# Patient Record
Sex: FEMALE | Race: WHITE | NOT HISPANIC OR LATINO | Employment: FULL TIME | URBAN - NONMETROPOLITAN AREA
[De-identification: names, ages, dates, MRNs, and addresses within clinical notes are randomized per-mention and may not be internally consistent; named-entity substitution may affect disease eponyms.]

---

## 2017-05-31 ENCOUNTER — OFFICE VISIT (OUTPATIENT)
Dept: FAMILY MEDICINE | Facility: OTHER | Age: 37
End: 2017-05-31
Payer: COMMERCIAL

## 2017-05-31 VITALS
DIASTOLIC BLOOD PRESSURE: 62 MMHG | BODY MASS INDEX: 24.07 KG/M2 | TEMPERATURE: 97.8 F | HEIGHT: 62 IN | SYSTOLIC BLOOD PRESSURE: 100 MMHG | WEIGHT: 130.8 LBS | HEART RATE: 72 BPM | RESPIRATION RATE: 16 BRPM

## 2017-05-31 DIAGNOSIS — L23.7 CONTACT DERMATITIS DUE TO POISON IVY: Primary | ICD-10-CM

## 2017-05-31 PROCEDURE — 99213 OFFICE O/P EST LOW 20 MIN: CPT | Performed by: PHYSICIAN ASSISTANT

## 2017-05-31 RX ORDER — PREDNISONE 20 MG/1
TABLET ORAL
Qty: 20 TABLET | Refills: 0 | Status: SHIPPED | OUTPATIENT
Start: 2017-05-31 | End: 2018-03-08

## 2017-05-31 RX ORDER — CLOBETASOL PROPIONATE 0.05 MG/G
GEL TOPICAL 2 TIMES DAILY
Qty: 45 G | Refills: 0 | Status: SHIPPED | OUTPATIENT
Start: 2017-05-31 | End: 2018-03-08

## 2017-05-31 ASSESSMENT — PAIN SCALES - GENERAL: PAINLEVEL: SEVERE PAIN (6)

## 2017-05-31 NOTE — PROGRESS NOTES
"  SUBJECTIVE:                                                    Nicole Phoenix is a 37 year old female who presents to clinic today for the following health issues:      Rash     Onset: 6 days    Description:   Location: bilateral lower leg, groin area  Character: blotchy, raised, painful, burning, draining, red  Itching (Pruritis): YES    Progression of Symptoms:  worsening    Accompanying Signs & Symptoms:  Fever: no   Body aches or joint pain: YES  Sore throat symptoms: no   Recent cold symptoms: no    History:   Previous similar rash: no     Precipitating factors:   Exposure to similar rash: no   New exposures: None   Recent travel: no     Alleviating factors:  nothing     Therapies Tried and outcome: poison ivy wash, hydrocortisone, poison ivy itch, Apple cider vinegar, baking soda, no relief    OBJECTIVE:  Vitals: Blood pressure 100/62, pulse 72, temperature 97.8  F (36.6  C), temperature source Oral, resp. rate 16, height 5' 2\" (1.575 m), weight 130 lb 12.8 oz (59.3 kg), not currently breastfeeding.  BMI: Body mass index is 23.92 kg/(m^2).  General:  Patient is in no apparent distress, well developed and well nourished with good attention to grooming and pleasant affect.  Cardiac:  Normal RRR.  Negative murmur, S1 and S2 present.  Negative heaves or thrills.  Respiratory:  Chest wall normal to palpation and percussion.  BS CTA T/O.  Negative retractions or accessory muscle use noted.  Pt has a good non-productive cough.  Skin:  Patient has well outlined plaques with mild edema and vesicular lesions that have crusted over as well to the lower legs, buttocks and thighs.  There is evidence of excoriation to the sites as well.    ASSESSMENT and PLAN:  (L23.7) Contact dermatitis due to poison ivy  (primary encounter diagnosis)  Comment: noted  Plan: predniSONE (DELTASONE) 20 MG tablet, clobetasol        (TEMOVATE) 0.05 % GEL topical gel        As directed    Follow up only if unimproved.    Domingo Peterson PA-C    "

## 2017-05-31 NOTE — MR AVS SNAPSHOT
"              After Visit Summary   5/31/2017    Nicole Phoenix    MRN: [id number]           Patient Information     Date Of Birth          1980        Visit Information        Provider Department      5/31/2017 1:00 PM Domingo Archuleta PA-C [facility]        Today's Diagnoses     Contact dermatitis due to poison ivy    -  1       Follow-ups after your visit        Your next 10 appointments already scheduled     May 31, 2017  1:00 PM CDT   SHORT with Domingo Archuleta PA-C   [facility] ([facility])    [address] [facility] [id number] [phone]              Who to contact     If you have questions or need follow up information about today's clinic visit or your schedule please contact [facility] directly at [phone].  Normal or non-critical lab and imaging results will be communicated to you by MyChart, letter or phone within 4 business days after the clinic has received the results. If you do not hear from us within 7 days, please contact the clinic through MyChart or phone. If you have a critical or abnormal lab result, we will notify you by phone as soon as possible.  Submit refill requests through Embly or call your pharmacy and they will forward the refill request to us. Please allow 3 business days for your refill to be completed.          Additional Information About Your Visit        MyChart Information     Embly lets you send messages to your doctor, view your test results, renew your prescriptions, schedule appointments and more. To sign up, go to [email or web address] . Click on \"Log in\" on the left side of the screen, which will take you to the Welcome page. Then click on \"Sign up Now\" on the right side of the page.     You will be asked to enter the access code listed below, as well as some personal information. Please follow the directions to create your username and password.     Your access code is: [email or web address]  Expires: " "2017 12:59 PM     Your access code will  in 90 days. If you need help or a new code, please call your [place] clinic or [phone].        Care EveryWhere ID     This is your Care EveryWhere ID. This could be used by other organizations to access your [place] medical records  [id number]        Your Vitals Were     Pulse Temperature Respirations Height BMI (Body Mass Index)       72 97.8  F (36.6  C) (Oral) 16 5' 2\" (1.575 m) 23.92 kg/m2        Blood Pressure from Last 3 Encounters:   17 100/62   16 92/52   10/18/16 122/78    Weight from Last 3 Encounters:   17 130 lb 12.8 oz (59.3 kg)   16 132 lb (59.9 kg)   10/18/16 129 lb 11.2 oz (58.8 kg)              Today, you had the following     No orders found for display         Today's Medication Changes          These changes are accurate as of: 17 12:59 PM.  If you have any questions, ask your nurse or doctor.               Start taking these medicines.        Dose/Directions    clobetasol 0.05 % Gel topical gel   Commonly known as:  TEMOVATE   Used for:  Contact dermatitis due to poison ivy   Started by:  Domingo Archuleta PA-C        Apply topically 2 times daily   Quantity:  45 g   Refills:  0       predniSONE 20 MG tablet   Commonly known as:  DELTASONE   Used for:  Contact dermatitis due to poison ivy   Started by:  Domingo Archuleta PA-C        Take 3 tabs (60 mg) by mouth daily x 3 days, 2 tabs (40 mg) daily x 3 days, 1 tab (20 mg) daily x 3 days, then 1/2 tab (10 mg) x 3 days.   Quantity:  20 tablet   Refills:  0            Where to get your medicines      These medications were sent to [facility] [place], [place] - [address]   [address] [facility] [address]     Phone:  [phone]     clobetasol 0.05 % Gel topical gel    predniSONE 20 MG tablet                Primary Care Provider Office Phone # Fax #    GINA Diehl [phone]       [place] [address] [address]  " [facility] [address]        Thank you!     Thank you for choosing [facility]  for your care. Our goal is always to provide you with excellent care. Hearing back from our patients is one way we can continue to improve our services. Please take a few minutes to complete the written survey that you may receive in the mail after your visit with us. Thank you!             Your Updated Medication List - Protect others around you: Learn how to safely use, store and throw away your medicines at www.disposemymeds.org.          This list is accurate as of: 5/31/17 12:59 PM.  Always use your most recent med list.                   Brand Name Dispense Instructions for use    clobetasol 0.05 % Gel topical gel    TEMOVATE    45 g    Apply topically 2 times daily       predniSONE 20 MG tablet    DELTASONE    20 tablet    Take 3 tabs (60 mg) by mouth daily x 3 days, 2 tabs (40 mg) daily x 3 days, 1 tab (20 mg) daily x 3 days, then 1/2 tab (10 mg) x 3 days.

## 2017-05-31 NOTE — NURSING NOTE
"Chief Complaint   Patient presents with     Derm Problem     lower legs, groin,        Initial /62 (BP Location: Right arm, Patient Position: Chair, Cuff Size: Adult Regular)  Pulse 72  Temp 97.8  F (36.6  C) (Oral)  Resp 16  Ht 5' 2\" (1.575 m)  Wt 130 lb 12.8 oz (59.3 kg)  BMI 23.92 kg/m2 Estimated body mass index is 23.92 kg/(m^2) as calculated from the following:    Height as of this encounter: 5' 2\" (1.575 m).    Weight as of this encounter: 130 lb 12.8 oz (59.3 kg).  Medication Reconciliation: complete       Abeba YA LPN      "

## 2017-06-09 ENCOUNTER — TELEPHONE (OUTPATIENT)
Dept: FAMILY MEDICINE | Facility: OTHER | Age: 37
End: 2017-06-09

## 2017-06-09 NOTE — TELEPHONE ENCOUNTER
Called pt, advised to be seen next week per Cynthia Mukherjee, GINA CNP. Patient voiced understanding..  ................Bora Culp LPN,   June 9, 2017,      4:56 PM,   [facility]

## 2017-06-09 NOTE — TELEPHONE ENCOUNTER
Called and spoke to the patient. She said last Wednesday she saw Domingo for poison rhea on her legs. Patient was put on Prednisone which she finished today. Patient said the poison ivy areas seemed to clear up but now she has developed another rash. Patient said she has had this rash for 2 days. She feels today it has gotten worse. Patient said the rash almost seems like it surrounds where the poison ivy areas were. Patient said the rash is all over her stomach, both arms, and legs. She said the rash is raised, itchy, and has a burning sensation. No blisters. Patient denies any trouble breathing. She said her daughter was looking at the back of her legs and said there looks like there are some purple blotches to the back of her legs.     Patient denies: trouble breathing, swelling in the mouth/throat/tongue, fever, headache, stiff neck, vomiting, confusion, facial/eye swelling, blistering rash, or open sores.     Patient is wondering what Cynthia would recommend. She said she finished her Prednisone today and feels the rash has gotten a lot worse. She is also concerned about the purple blotches on the back of her legs.     Will route to provider for further recommendations.     Talia Haro RN  [facility]

## 2017-06-09 NOTE — TELEPHONE ENCOUNTER
Reason for call:  Patient reporting a symptom    Symptom or request: rash. Pt had poison ivy. That seems to be better. Pt developed another rash.    Duration (how long have symptoms been present): 2 days    Have you been treated for this before? Yes    Additional comments:     Phone Number patient can be reached at:      Best Time:  anytime    Can we leave a detailed message on this number:  YES    Call taken on 6/9/2017 at 2:24 PM by Akanksha Yepez

## 2017-12-02 ENCOUNTER — HEALTH MAINTENANCE LETTER (OUTPATIENT)
Age: 37
End: 2017-12-02

## 2018-01-06 ENCOUNTER — HEALTH MAINTENANCE LETTER (OUTPATIENT)
Age: 38
End: 2018-01-06

## 2018-03-08 ENCOUNTER — OFFICE VISIT (OUTPATIENT)
Dept: FAMILY MEDICINE | Facility: OTHER | Age: 38
End: 2018-03-08
Payer: COMMERCIAL

## 2018-03-08 VITALS
SYSTOLIC BLOOD PRESSURE: 122 MMHG | TEMPERATURE: 97.6 F | HEIGHT: 62 IN | BODY MASS INDEX: 24.16 KG/M2 | WEIGHT: 131.3 LBS | DIASTOLIC BLOOD PRESSURE: 80 MMHG | RESPIRATION RATE: 16 BRPM | HEART RATE: 76 BPM

## 2018-03-08 DIAGNOSIS — R53.83 FATIGUE, UNSPECIFIED TYPE: ICD-10-CM

## 2018-03-08 DIAGNOSIS — N92.6 IRREGULAR PERIODS: ICD-10-CM

## 2018-03-08 DIAGNOSIS — F33.2 SEVERE EPISODE OF RECURRENT MAJOR DEPRESSIVE DISORDER, WITHOUT PSYCHOTIC FEATURES (H): Primary | ICD-10-CM

## 2018-03-08 LAB
ERYTHROCYTE [DISTWIDTH] IN BLOOD BY AUTOMATED COUNT: 12 % (ref 10–15)
HCT VFR BLD AUTO: 43.1 % (ref 35–47)
HGB BLD-MCNC: 14.6 G/DL (ref 11.7–15.7)
MCH RBC QN AUTO: 31.9 PG (ref 26.5–33)
MCHC RBC AUTO-ENTMCNC: 33.9 G/DL (ref 31.5–36.5)
MCV RBC AUTO: 94 FL (ref 78–100)
PLATELET # BLD AUTO: 282 10E9/L (ref 150–450)
RBC # BLD AUTO: 4.57 10E12/L (ref 3.8–5.2)
TSH SERPL DL<=0.005 MIU/L-ACNC: 0.81 MU/L (ref 0.4–4)
WBC # BLD AUTO: 18.4 10E9/L (ref 4–11)

## 2018-03-08 PROCEDURE — 85027 COMPLETE CBC AUTOMATED: CPT | Performed by: PHYSICIAN ASSISTANT

## 2018-03-08 PROCEDURE — 84443 ASSAY THYROID STIM HORMONE: CPT | Performed by: PHYSICIAN ASSISTANT

## 2018-03-08 PROCEDURE — 36415 COLL VENOUS BLD VENIPUNCTURE: CPT | Performed by: PHYSICIAN ASSISTANT

## 2018-03-08 PROCEDURE — 99214 OFFICE O/P EST MOD 30 MIN: CPT | Performed by: PHYSICIAN ASSISTANT

## 2018-03-08 RX ORDER — MIRTAZAPINE 15 MG/1
15 TABLET, FILM COATED ORAL AT BEDTIME
Qty: 30 TABLET | Refills: 1 | Status: SHIPPED | OUTPATIENT
Start: 2018-03-08 | End: 2018-04-05

## 2018-03-08 RX ORDER — VENLAFAXINE HYDROCHLORIDE 37.5 MG/1
37.5 CAPSULE, EXTENDED RELEASE ORAL DAILY
Qty: 30 CAPSULE | Refills: 1 | Status: SHIPPED | OUTPATIENT
Start: 2018-03-08 | End: 2018-04-05

## 2018-03-08 ASSESSMENT — ANXIETY QUESTIONNAIRES
3. WORRYING TOO MUCH ABOUT DIFFERENT THINGS: NEARLY EVERY DAY
7. FEELING AFRAID AS IF SOMETHING AWFUL MIGHT HAPPEN: MORE THAN HALF THE DAYS
1. FEELING NERVOUS, ANXIOUS, OR ON EDGE: SEVERAL DAYS
GAD7 TOTAL SCORE: 9
6. BECOMING EASILY ANNOYED OR IRRITABLE: MORE THAN HALF THE DAYS
IF YOU CHECKED OFF ANY PROBLEMS ON THIS QUESTIONNAIRE, HOW DIFFICULT HAVE THESE PROBLEMS MADE IT FOR YOU TO DO YOUR WORK, TAKE CARE OF THINGS AT HOME, OR GET ALONG WITH OTHER PEOPLE: EXTREMELY DIFFICULT
5. BEING SO RESTLESS THAT IT IS HARD TO SIT STILL: NOT AT ALL
2. NOT BEING ABLE TO STOP OR CONTROL WORRYING: NOT AT ALL

## 2018-03-08 ASSESSMENT — PAIN SCALES - GENERAL: PAINLEVEL: EXTREME PAIN (8)

## 2018-03-08 ASSESSMENT — PATIENT HEALTH QUESTIONNAIRE - PHQ9: 5. POOR APPETITE OR OVEREATING: SEVERAL DAYS

## 2018-03-08 NOTE — MR AVS SNAPSHOT
After Visit Summary   3/8/2018    Nicole Phoenix    MRN: [id number]           Patient Information     Date Of Birth          1980        Visit Information        Provider Department      3/8/2018 10:40 AM Ludy Cruz PA-C [facility]        Today's Diagnoses     Severe episode of recurrent major depressive disorder, without psychotic features (H)    -  1    Irregular periods        Fatigue, unspecified type          Care Instructions    I will get some lab work today and will have you start some medications to help with sleep and depression symptoms. I will refer you to counseling, they will call you to schedule. I would encourage you to keep a schedule to your day and avoid sleeping for more than 9 hours in a 24 hour period. Follow up if worsening symptoms or not tolerating medications and follow up with your primary provider in 4-5 weeks for a recheck and refill of medications.           Follow-ups after your visit        Additional Services     MENTAL HEALTH REFERRAL  - Adult; Outpatient Treatment; Individual/Couples/Family/Group Therapy/Health Psychology; [facility] [phone]; We will contact you to schedule the appointment or please call with any questions       All scheduling is subject to the client's specific insurance plan & benefits, provider/location availability, and provider clinical specialities.  Please arrive 15 minutes early for your first appointment and bring your completed paperwork.    Please be aware that coverage of these services is subject to the terms and limitations of your health insurance plan.  Call member services at your health plan with any benefit or coverage questions.                            Follow-up notes from your care team     Return if symptoms worsen or fail to improve.      Who to contact     If you have questions or need follow up information about today's clinic visit or your schedule please contact  "[facility] directly at [phone].  Normal or non-critical lab and imaging results will be communicated to you by MyChart, letter or phone within 4 business days after the clinic has received the results. If you do not hear from us within 7 days, please contact the clinic through [employer] or phone. If you have a critical or abnormal lab result, we will notify you by phone as soon as possible.  Submit refill requests through MD-IT or call your pharmacy and they will forward the refill request to us. Please allow 3 business days for your refill to be completed.          Additional Information About Your Visit        [employer] Information     MD-IT lets you send messages to your doctor, view your test results, renew your prescriptions, schedule appointments and more. To sign up, go to [email or web address] . Click on \"Log in\" on the left side of the screen, which will take you to the Welcome page. Then click on \"Sign up Now\" on the right side of the page.     You will be asked to enter the access code listed below, as well as some personal information. Please follow the directions to create your username and password.     Your access code is: [email or web address]  Expires: 2018 11:15 AM     Your access code will  in 90 days. If you need help or a new code, please call your [place] clinic or [phone].        Care EveryWhere ID     This is your Care EveryWhere ID. This could be used by other organizations to access your [place] medical records  [id number]        Your Vitals Were     Pulse Temperature Respirations Height BMI (Body Mass Index)       76 97.6  F (36.4  C) (Oral) 16 5' 2\" (1.575 m) 24.02 kg/m2        Blood Pressure from Last 3 Encounters:   18 122/80   17 100/62   16 92/52    Weight from Last 3 Encounters:   18 131 lb 4.8 oz (59.6 kg)   17 130 lb 12.8 oz (59.3 kg)   16 132 lb (59.9 kg)              We Performed the Following     CBC with " platelets     MENTAL HEALTH REFERRAL  - Adult; Outpatient Treatment; Individual/Couples/Family/Group Therapy/Health Psychology; G: [facility] [phone]; We will contact you to schedule the appointment or please call with any questions     TSH with free T4 reflex          Today's Medication Changes          These changes are accurate as of 3/8/18 11:15 AM.  If you have any questions, ask your nurse or doctor.               Start taking these medicines.        Dose/Directions    mirtazapine 15 MG tablet   Commonly known as:  REMERON   Used for:  Severe episode of recurrent major depressive disorder, without psychotic features (H)   Started by:  Ludy Cruz PA-C        Dose:  15 mg   Take 1 tablet (15 mg) by mouth At Bedtime   Quantity:  30 tablet   Refills:  1       venlafaxine 37.5 MG 24 hr capsule   Commonly known as:  EFFEXOR-XR   Used for:  Severe episode of recurrent major depressive disorder, without psychotic features (H)   Started by:  Ludy Cruz PA-C        Dose:  37.5 mg   Take 1 capsule (37.5 mg) by mouth daily   Quantity:  30 capsule   Refills:  1            Where to get your medicines      These medications were sent to [facility] [address]   [address] [facility] [address]     Phone:  [phone]     mirtazapine 15 MG tablet    venlafaxine 37.5 MG 24 hr capsule                Primary Care Provider Office Phone # Fax #    Cynthia Lizzie Mukherjee, APRN [phone]       [address] [facility] [address]        Equal Access to Services     [facility] AH: Hadii kassie sethi hadasho Soomaali, waaxda luqadaha, qaybta kaalmada adeegyada, kelley jiang. So [facility] [phone].    ATENCIÓN: Si habla español, tiene a bai disposición servicios gratuitos de asistencia lingüística. Llame al [phone].    We comply with applicable federal civil rights laws and [place] laws. We do not discriminate on the basis  of race, color, national origin, age, disability, sex, sexual orientation, or gender identity.            Thank you!     Thank you for choosing [facility]  for your care. Our goal is always to provide you with excellent care. Hearing back from our patients is one way we can continue to improve our services. Please take a few minutes to complete the written survey that you may receive in the mail after your visit with us. Thank you!             Your Updated Medication List - Protect others around you: Learn how to safely use, store and throw away your medicines at www.disposemymeds.org.          This list is accurate as of 3/8/18 11:15 AM.  Always use your most recent med list.                   Brand Name Dispense Instructions for use Diagnosis    mirtazapine 15 MG tablet    REMERON    30 tablet    Take 1 tablet (15 mg) by mouth At Bedtime    Severe episode of recurrent major depressive disorder, without psychotic features (H)       venlafaxine 37.5 MG 24 hr capsule    EFFEXOR-XR    30 capsule    Take 1 capsule (37.5 mg) by mouth daily    Severe episode of recurrent major depressive disorder, without psychotic features (H)

## 2018-03-08 NOTE — NURSING NOTE
"Chief Complaint   Patient presents with     Depression     Anxiety       Initial /80 (BP Location: Right arm, Patient Position: Chair, Cuff Size: Adult Regular)  Pulse 76  Temp 97.6  F (36.4  C) (Oral)  Resp 16  Ht 5' 2\" (1.575 m)  Wt 131 lb 4.8 oz (59.6 kg)  BMI 24.02 kg/m2 Estimated body mass index is 24.02 kg/(m^2) as calculated from the following:    Height as of this encounter: 5' 2\" (1.575 m).    Weight as of this encounter: 131 lb 4.8 oz (59.6 kg).  Medication Reconciliation: complete     Abeba YA LPN      "

## 2018-03-08 NOTE — PROGRESS NOTES
SUBJECTIVE:   Nicole Phoenix is a 38 year old female who presents to clinic today for the following health issues:      Abnormal Mood Symptoms  Onset: 6 months    Description:   Depression: YES  Anxiety: YES    Accompanying Signs & Symptoms:  Still participating in activities that you used to enjoy: no  Fatigue: YES  Irritability: YES  Difficulty concentrating: YES  Changes in appetite: YES  Problems with sleep: YES  Heart racing/beating fast : YES  Thoughts of hurting yourself or others: yes, thought of hurting herself    History:   Recent stress: YES  Prior depression hospitalization: None  Family history of depression: YES  Family history of anxiety: YES    Precipitating factors:   Alcohol/drug use: YES- alcohol     Alleviating factors:  nothing    Therapies Tried and outcome: Lexapro    Patient reports that she has had issues with depression off and on for most of her life but notes that in the last 6 months her symptoms have been getting progressively worse. She reports that yesterday her thoughts got bad enough that she went and saw a psychologist in [place]. She has thoughts of how easy it would be to kill herself but denies any plan and is not concerned that she would actually do anything to harm herself or others She has good support through friends and family and reports that she does better on days when she works because she stays busy. On Her off stint she spends a lot of time in bed and has been drinking to help numb her feelings. She does not drink at all on days that she works so knows she needs to stop drinking but is not concerned that she cannot do this without detox as she is not drinking on a daily basis. She has been on Lexapro in the past but did not feel like that was helpful. She has had issues sleeping typically issues with falling asleep.     -------------------------------------    Problem list and histories reviewed & adjusted, as indicated.  Additional history: as documented    BP  "Readings from Last 3 Encounters:   03/08/18 122/80   05/31/17 100/62   11/02/16 92/52    Wt Readings from Last 3 Encounters:   03/08/18 131 lb 4.8 oz (59.6 kg)   05/31/17 130 lb 12.8 oz (59.3 kg)   11/02/16 132 lb (59.9 kg)           Reviewed and updated as needed this visit by clinical staff  Tobacco  Allergies  Med Hx  Surg Hx  Fam Hx  Soc Hx      Reviewed and updated as needed this visit by Provider         ROS:  Constitutional, HEENT, cardiovascular, pulmonary, gi and gu systems are negative, except as otherwise noted.    OBJECTIVE:     /80 (BP Location: Right arm, Patient Position: Chair, Cuff Size: Adult Regular)  Pulse 76  Temp 97.6  F (36.4  C) (Oral)  Resp 16  Ht 5' 2\" (1.575 m)  Wt 131 lb 4.8 oz (59.6 kg)  BMI 24.02 kg/m2  Body mass index is 24.02 kg/(m^2).  GENERAL: healthy, alert and no distress  MS: no gross musculoskeletal defects noted, no edema  SKIN: no suspicious lesions or rashes  PSYCH: affect flat, tearful, fatigued, judgement and insight intact and appearance well groomed    Diagnostic Test Results:  Labs pending    ASSESSMENT/PLAN:       ICD-10-CM    1. Severe episode of recurrent major depressive disorder, without psychotic features (H) F33.2 venlafaxine (EFFEXOR-XR) 37.5 MG 24 hr capsule     mirtazapine (REMERON) 15 MG tablet     MENTAL HEALTH REFERRAL  - Adult; Outpatient Treatment; Individual/Couples/Family/Group Therapy/Health Psychology; [facility] [place] [phone]; We will contact you to schedule the appointment or please call with any questions   2. Irregular periods N92.6    3. Fatigue, unspecified type R53.83 TSH with free T4 reflex     CBC with platelets       I will start patient on Effexor and Remeron to help with depression and sleep symptoms. I did review that it would take about 4 weeks for her to notice the effects of her Effexor. I will refer her to therapy and get some lab work and would have her call if any new or worsening symptoms or " if not tolerating her medications. She verbally contracts against self harm but was encouraged to go to the ER if acute worsening of symptoms. Follow up with PCP for recheck and refills in 1 month, sooner as needed.   See Patient Instructions    Ludy Cruz PA-C  [facility]

## 2018-03-09 ASSESSMENT — PATIENT HEALTH QUESTIONNAIRE - PHQ9: SUM OF ALL RESPONSES TO PHQ QUESTIONS 1-9: 18

## 2018-03-09 ASSESSMENT — ANXIETY QUESTIONNAIRES: GAD7 TOTAL SCORE: 9

## 2018-03-12 ENCOUNTER — TELEPHONE (OUTPATIENT)
Dept: FAMILY MEDICINE | Facility: OTHER | Age: 38
End: 2018-03-12

## 2018-03-12 NOTE — TELEPHONE ENCOUNTER
I called this patient with the following lab results per Ludy Cruz PA-C:    Thyroid function is normal and blood cell counts show no evidence of anemia. There is an elevation in white blood cell count and I would have you follow up to recheck this in 1-2 weeks.

## 2018-03-12 NOTE — TELEPHONE ENCOUNTER
----- Message from Ludy Cruz PA-C sent at 3/12/2018  7:57 AM CDT -----  Thyroid function is normal and blood cell counts show no evidence of anemia. There is an elevation in white blood cell count and I would have you follow up to recheck this in 1-2 weeks.

## 2018-03-28 ENCOUNTER — TELEPHONE (OUTPATIENT)
Dept: FAMILY MEDICINE | Facility: OTHER | Age: 38
End: 2018-03-28

## 2018-03-29 NOTE — TELEPHONE ENCOUNTER
CHANCE Carrasco  Left message for patient to return call to clinic  She is overdue for pap due to hx of HPV positive in 2016. She has visit on 4/6/18 for depression follow up. Note added to visit regarding pap. Unsure if you would be able to do pap at same visit.    Thank you,  Lucretia Magana, SHAQUILLEN, RN, Pap Tracking Nurse

## 2018-03-29 NOTE — TELEPHONE ENCOUNTER
Yes, we can do both together, but please change the visit to a physical.     Electronically signed by Cynthia Mukherjee CNP.

## 2018-03-29 NOTE — TELEPHONE ENCOUNTER
11/2/16: NIL Pap, + HR HPV (Neg 16/18). Plan cotest in 1 year.   10/19/17 Cotest reminder letter sent (rlm)  4/6/18 Provider visit - depression follow up

## 2018-04-05 ENCOUNTER — TELEPHONE (OUTPATIENT)
Dept: FAMILY MEDICINE | Facility: OTHER | Age: 38
End: 2018-04-05

## 2018-04-05 ENCOUNTER — OFFICE VISIT (OUTPATIENT)
Dept: FAMILY MEDICINE | Facility: OTHER | Age: 38
End: 2018-04-05
Payer: COMMERCIAL

## 2018-04-05 VITALS
RESPIRATION RATE: 16 BRPM | TEMPERATURE: 98.5 F | BODY MASS INDEX: 25.58 KG/M2 | HEIGHT: 62 IN | DIASTOLIC BLOOD PRESSURE: 60 MMHG | HEART RATE: 112 BPM | SYSTOLIC BLOOD PRESSURE: 104 MMHG | WEIGHT: 139 LBS | OXYGEN SATURATION: 99 %

## 2018-04-05 DIAGNOSIS — Z00.00 ROUTINE GENERAL MEDICAL EXAMINATION AT A HEALTH CARE FACILITY: Primary | ICD-10-CM

## 2018-04-05 DIAGNOSIS — D72.829 LEUKOCYTOSIS, UNSPECIFIED TYPE: ICD-10-CM

## 2018-04-05 DIAGNOSIS — F33.2 SEVERE EPISODE OF RECURRENT MAJOR DEPRESSIVE DISORDER, WITHOUT PSYCHOTIC FEATURES (H): ICD-10-CM

## 2018-04-05 DIAGNOSIS — Z12.4 SCREENING FOR CERVICAL CANCER: ICD-10-CM

## 2018-04-05 LAB
ERYTHROCYTE [DISTWIDTH] IN BLOOD BY AUTOMATED COUNT: 12.1 % (ref 10–15)
HCT VFR BLD AUTO: 40.2 % (ref 35–47)
HGB BLD-MCNC: 13.6 G/DL (ref 11.7–15.7)
MCH RBC QN AUTO: 31.8 PG (ref 26.5–33)
MCHC RBC AUTO-ENTMCNC: 33.8 G/DL (ref 31.5–36.5)
MCV RBC AUTO: 94 FL (ref 78–100)
PLATELET # BLD AUTO: 271 10E9/L (ref 150–450)
RBC # BLD AUTO: 4.28 10E12/L (ref 3.8–5.2)
WBC # BLD AUTO: 12.6 10E9/L (ref 4–11)

## 2018-04-05 PROCEDURE — G0145 SCR C/V CYTO,THINLAYER,RESCR: HCPCS | Performed by: NURSE PRACTITIONER

## 2018-04-05 PROCEDURE — 87624 HPV HI-RISK TYP POOLED RSLT: CPT | Performed by: NURSE PRACTITIONER

## 2018-04-05 PROCEDURE — 85027 COMPLETE CBC AUTOMATED: CPT | Performed by: NURSE PRACTITIONER

## 2018-04-05 PROCEDURE — 36415 COLL VENOUS BLD VENIPUNCTURE: CPT | Performed by: NURSE PRACTITIONER

## 2018-04-05 PROCEDURE — 99395 PREV VISIT EST AGE 18-39: CPT | Performed by: NURSE PRACTITIONER

## 2018-04-05 PROCEDURE — 99213 OFFICE O/P EST LOW 20 MIN: CPT | Mod: 25 | Performed by: NURSE PRACTITIONER

## 2018-04-05 RX ORDER — MIRTAZAPINE 15 MG/1
15 TABLET, FILM COATED ORAL AT BEDTIME
Qty: 30 TABLET | Refills: 1 | Status: SHIPPED | OUTPATIENT
Start: 2018-04-05 | End: 2018-05-31

## 2018-04-05 RX ORDER — VENLAFAXINE HYDROCHLORIDE 75 MG/1
75 CAPSULE, EXTENDED RELEASE ORAL DAILY
Qty: 30 CAPSULE | Refills: 1 | Status: SHIPPED | OUTPATIENT
Start: 2018-04-05 | End: 2018-05-31

## 2018-04-05 ASSESSMENT — PATIENT HEALTH QUESTIONNAIRE - PHQ9
SUM OF ALL RESPONSES TO PHQ QUESTIONS 1-9: 10
10. IF YOU CHECKED OFF ANY PROBLEMS, HOW DIFFICULT HAVE THESE PROBLEMS MADE IT FOR YOU TO DO YOUR WORK, TAKE CARE OF THINGS AT HOME, OR GET ALONG WITH OTHER PEOPLE: VERY DIFFICULT
SUM OF ALL RESPONSES TO PHQ QUESTIONS 1-9: 10

## 2018-04-05 NOTE — LETTER
April 9, 2018      Nicole Phoenix  [address]  [place] [address]        Dear ,    We are writing to inform you of your test results.    White blood cell count is improved now.  Just slightly elevated, likely due to stress.  You should not need any further follow up on this unless you develop other symptoms.     Resulted Orders   CBC with platelets   Result Value Ref Range    WBC 12.6 (H) 4.0 - 11.0 10e9/L    RBC Count 4.28 3.8 - 5.2 10e12/L    Hemoglobin 13.6 11.7 - 15.7 g/dL    Hematocrit 40.2 35.0 - 47.0 %    MCV 94 78 - 100 fl    MCH 31.8 26.5 - 33.0 pg    MCHC 33.8 31.5 - 36.5 g/dL    RDW 12.1 10.0 - 15.0 %    Platelet Count 271 150 - 450 10e9/L     If you have any questions or concerns, please call the clinic at the number listed above.       Sincerely,        GINA Diehl CNP

## 2018-04-05 NOTE — NURSING NOTE
"Chief Complaint   Patient presents with     Physical     RECHECK     depression - slightly worsened     Health Maintenance     pap/hpv, dap       Initial /60  Pulse 112  Temp 98.5  F (36.9  C) (Tympanic)  Resp 16  Ht 5' 2\" (1.575 m)  Wt 139 lb (63 kg)  LMP  (LMP Unknown)  SpO2 99%  Breastfeeding? No  BMI 25.42 kg/m2 Estimated body mass index is 25.42 kg/(m^2) as calculated from the following:    Height as of this encounter: 5' 2\" (1.575 m).    Weight as of this encounter: 139 lb (63 kg).  Medication Reconciliation: complete   ................Bora Culp LPN,   April 5, 2018,      2:37 PM,   [facility]     "

## 2018-04-05 NOTE — LETTER
April 12, 2018    Nicole Phoenix  [address] [facility] [address]    Dear Nicole,  We are happy to inform you that your PAP smear result from 4/5/18 is normal.  We are now able to do a follow up test on PAP smears. The DNA test is for HPV (Human Papilloma Virus). Cervical cancer is closely linked with certain types of HPV. Your results showed no evidence of high risk HPV.  Therefore we recommend you return in 3 years for your next pap smear and HPV test.  You will still need to return to the clinic every year for an annual exam and other preventive tests.  Please contact the clinic at [phone] with any questions.  Sincerely,    GINA Diehl CNP/rlm

## 2018-04-05 NOTE — TELEPHONE ENCOUNTER
----- Message from GINA Diehl CNP sent at 4/5/2018  3:34 PM CDT -----  Please notify patient, call or letter    White blood cell count is improved now.  Just slightly elevated, likely due to stress.  She should not need any further follow up on this unless she develops other symptoms.     Electronically signed by Cynthia Mukherjee CNP.

## 2018-04-05 NOTE — LETTER
My Depression Action Plan  Name: Nicole Phoenix   Date of Birth 1980  Date: 4/5/2018    My doctor: Cynthia Mukherjee   My clinic: [place] [address] [facility] [address] [phone]          GREEN    ZONE   Good Control    What it looks like:     Things are going generally well. You have normal up s and down s. You may even feel depressed from time to time, but bad moods usually last less than a day.   What you need to do:  1. Continue to care for yourself (see self care plan)  2. Check your depression survival kit and update it as needed  3. Follow your physician s recommendations including any medication.  4. Do not stop taking medication unless you consult with your physician first.           YELLOW         ZONE Getting Worse    What it looks like:     Depression is starting to interfere with your life.     It may be hard to get out of bed; you may be starting to isolate yourself from others.    Symptoms of depression are starting to last most all day and this has happened for several days.     You may have suicidal thoughts but they are not constant.   What you need to do:     1. Call your care team, your response to treatment will improve if you keep your care team informed of your progress. Yellow periods are signs an adjustment may need to be made.     2. Continue your self-care, even if you have to fake it!    3. Talk to someone in your support network    4. Open up your depression survival kit           RED    ZONE Medical Alert - Get Help    What it looks like:     Depression is seriously interfering with your life.     You may experience these or other symptoms: You can t get out of bed most days, can t work or engage in other necessary activities, you have trouble taking care of basic hygiene, or basic responsibilities, thoughts of suicide or death that will not go away, self-injurious behavior.     What you need to do:  1. Call your care team and request a  same-day appointment. If they are not available (weekends or after hours) call your local crisis line, emergency room or 911.            Depression Self Care Plan / Survival Kit    Self-Care for Depression  Here s the deal. Your body and mind are really not as separate as most people think.  What you do and think affects how you feel and how you feel influences what you do and think. This means if you do things that people who feel good do, it will help you feel better.  Sometimes this is all it takes.  There is also a place for medication and therapy depending on how severe your depression is, so be sure to consult with your medical provider and/ or Behavioral Health Consultant if your symptoms are worsening or not improving.     In order to better manage my stress, I will:    Exercise  Get some form of exercise, every day. This will help reduce pain and release endorphins, the  feel good  chemicals in your brain. This is almost as good as taking antidepressants!  This is not the same as joining a gym and then never going! (they count on that by the way ) It can be as simple as just going for a walk or doing some gardening, anything that will get you moving.      Hygiene   Maintain good hygiene (Get out of bed in the morning, Make your bed, Brush your teeth, Take a shower, and Get dressed like you were going to work, even if you are unemployed).  If your clothes don't fit try to get ones that do.    Diet  I will strive to eat foods that are good for me, drink plenty of water, and avoid excessive sugar, caffeine, alcohol, and other mood-altering substances.  Some foods that are helpful in depression are: complex carbohydrates, B vitamins, flaxseed, fish or fish oil, fresh fruits and vegetables.    Psychotherapy  I agree to participate in Individual Therapy (if recommended).    Medication  If prescribed medications, I agree to take them.  Missing doses can result in serious side effects.  I understand that drinking  alcohol, or other illicit drug use, may cause potential side effects.  I will not stop my medication abruptly without first discussing it with my provider.    Staying Connected With Others  I will stay in touch with my friends, family members, and my primary care provider/team.    Use your imagination  Be creative.  We all have a creative side; it doesn t matter if it s oil painting, sand castles, or mud pies! This will also kick up the endorphins.    Witness Beauty  (AKA stop and smell the roses) Take a look outside, even in mid-winter. Notice colors, textures. Watch the squirrels and birds.     Service to others  Be of service to others.  There is always someone else in need.  By helping others we can  get out of ourselves  and remember the really important things.  This also provides opportunities for practicing all the other parts of the program.    Humor  Laugh and be silly!  Adjust your TV habits for less news and crime-drama and more comedy.    Control your stress  Try breathing deep, massage therapy, biofeedback, and meditation. Find time to relax each day.     My support system    Clinic Contact:  Phone number:    Contact 1:  Phone number:    Contact 2:  Phone number:    [employer]/:  Phone number:    Therapist:  Phone number:    Local crisis center:    Phone number:    Other community support:  Phone number:

## 2018-04-05 NOTE — PROGRESS NOTES
Called pt, left msg informing to call clinic for results.  ................Bora Culp LPN,   April 5, 2018,      4:38 PM,   [facility]

## 2018-04-05 NOTE — PATIENT INSTRUCTIONS
Increase your Effexor to 75 mg daily     Keep the Remeron dose the same.     Labs will be done today.   For normal results, you will receive a letter with the results in about 2 weeks.  If anything is abnormal or unexpected, someone from the clinic will call you.      Follow up in 1-2 months for recheck.     Preventive Health Recommendations  Female Ages 26 - 39  Yearly exam:   See your health care provider every year in order to    Review health changes.     Discuss preventive care.      Review your medicines if you your doctor has prescribed any.    Until age 30: Get a Pap test every three years (more often if you have had an abnormal result).    After age 30: Talk to your doctor about whether you should have a Pap test every 3 years or have a Pap test with HPV screening every 5 years.   You do not need a Pap test if your uterus was removed (hysterectomy) and you have not had cancer.  You should be tested each year for STDs (sexually transmitted diseases), if you're at risk.   Talk to your provider about how often to have your cholesterol checked.  If you are at risk for diabetes, you should have a diabetes test (fasting glucose).  Shots: Get a flu shot each year. Get a tetanus shot every 10 years.   Nutrition:     Eat at least 5 servings of fruits and vegetables each day.    Eat whole-grain bread, whole-wheat pasta and brown rice instead of white grains and rice.    Talk to your provider about Calcium and Vitamin D.     Lifestyle    Exercise at least 150 minutes a week (30 minutes a day, 5 days of the week). This will help you control your weight and prevent disease.    Limit alcohol to one drink per day.    No smoking.     Wear sunscreen to prevent skin cancer.    See your dentist every six months for an exam and cleaning.

## 2018-04-05 NOTE — PROGRESS NOTES
SUBJECTIVE:   CC: Nicole Phoenix is an 38 year old woman who presents for preventive health visit.     Physical   Annual:     Getting at least 3 servings of Calcium per day::  Yes    Bi-annual eye exam::  Yes    Dental care twice a year::  Yes    Sleep apnea or symptoms of sleep apnea::  Daytime drowsiness    Diet::  Regular (no restrictions)    Frequency of exercise::  1 day/week    Duration of exercise::  15-30 minutes    Taking medications regularly::  Yes    Medication side effects::  None    Additional concerns today::  YES (depression slightly worsened)              Depression Followup    Status since last visit: Worsened slightly    See PHQ-9 for current symptoms.  Other associated symptoms: None    Complicating factors:   Significant life event:  No   Current substance abuse:  None  Anxiety or Panic symptoms:  Yes-  anxiety    PHQ-9 3/8/2018 4/5/2018   Total Score 18 10   Q9: Suicide Ideation Several days Not at all         Today's PHQ-2 Score:   PHQ-2 ( 1999 [employer]) 4/5/2018   Q1: Little interest or pleasure in doing things 1   Q2: Feeling down, depressed or hopeless 2   PHQ-2 Score 3   Q1: Little interest or pleasure in doing things Several days   Q2: Feeling down, depressed or hopeless More than half the days   PHQ-2 Score 3       Seen in clinic one month ago.  Started on Effexor and Remeron for anxiety/depression symptoms.  Symptoms did improve initially, now worsening again.  She is sleeping better, however.  Has only ever been on Lexapro previously for mood.  Is starting counseling next week.     WBC was also noted to be elevated at that visit.  Needs a recheck.  No fevers/chills.  Has not felt ill.      Abuse: Current or Past(Physical, Sexual or Emotional)- No  Do you feel safe in your environment - Yes    Social History   Substance Use Topics     Smoking status: Current Some Day Smoker     Packs/day: 0.10     Years: 4.00     Types: Cigarettes     Smokeless tobacco: Never Used     Alcohol use No      Alcohol Use 2018   If you drink alcohol do you typically have greater than 3 drinks per day OR greater than 7 drinks per week? No       Reviewed orders with patient.  Reviewed health maintenance and updated orders accordingly - Yes  Labs reviewed in EPIC  BP Readings from Last 3 Encounters:   18 104/60   18 122/80   17 100/62    Wt Readings from Last 3 Encounters:   18 139 lb (63 kg)   18 131 lb 4.8 oz (59.6 kg)   17 130 lb 12.8 oz (59.3 kg)                  Patient Active Problem List   Diagnosis     Fibromyalgia     Intractable chronic migraine without aura and without status migrainosus     Syncope, unspecified syncope type     Neck muscle spasm     Cervicalgia     Cervical high risk HPV (human papillomavirus) test positive     Contact dermatitis due to poison ivy     Severe episode of recurrent major depressive disorder, without psychotic features (H)     Past Surgical History:   Procedure Laterality Date     C  DELIVERY+POSTPARTUM CARE       HC REMOVAL OF TONSILS,<13 Y/O       HC REPAIR EXTEN TENDON FINGER W/O GRAFT, EACH  03/15/09    right ring finger       Social History   Substance Use Topics     Smoking status: Current Some Day Smoker     Packs/day: 0.10     Years: 4.00     Types: Cigarettes     Smokeless tobacco: Never Used     Alcohol use No     Family History   Problem Relation Age of Onset     Breast Cancer Maternal Grandmother      Alcohol/Drug Maternal Grandfather      recovering alcoholic     DIABETES Paternal Grandmother      CEREBROVASCULAR DISEASE Paternal Grandmother          Current Outpatient Prescriptions   Medication Sig Dispense Refill     venlafaxine (EFFEXOR-XR) 75 MG 24 hr capsule Take 1 capsule (75 mg) by mouth daily 30 capsule 1     mirtazapine (REMERON) 15 MG tablet Take 1 tablet (15 mg) by mouth At Bedtime 30 tablet 1     [DISCONTINUED] venlafaxine (EFFEXOR-XR) 37.5 MG 24 hr capsule Take 1 capsule (37.5 mg) by mouth daily 30 capsule  "1     [DISCONTINUED] mirtazapine (REMERON) 15 MG tablet Take 1 tablet (15 mg) by mouth At Bedtime 30 tablet 1       Mammogram not appropriate for this patient based on age.    Pertinent mammograms are reviewed under the imaging tab.  History of abnormal Pap smear:   Last 3 Pap Results:   PAP (no units)   Date Value   2016 NIL   08/10/2005 NIL     Positive HPV last year.  Needs repeat this year.     Reviewed and updated as needed this visit by clinical staff  Tobacco  Allergies  Meds  Med Hx  Surg Hx  Fam Hx  Soc Hx        Reviewed and updated as needed this visit by Provider        Past Medical History:   Diagnosis Date     Cervical high risk HPV (human papillomavirus) test positive 16    Neg      Cervicalgia 10/18/2016     Neck muscle spasm 10/18/2016     NO ACTIVE PROBLEMS      Syncope, unspecified syncope type 10/18/2016      Past Surgical History:   Procedure Laterality Date     C  DELIVERY+POSTPARTUM CARE       HC REMOVAL OF TONSILS,<11 Y/O       HC REPAIR EXTEN TENDON FINGER W/O GRAFT, EACH  03/15/09    right ring finger       Review of Systems  C: NEGATIVE for fever, chills, change in weight  I: NEGATIVE for worrisome rashes, moles or lesions  E: NEGATIVE for vision changes or irritation  ENT: NEGATIVE for ear, mouth and throat problems  R: NEGATIVE for significant cough or SOB  B: NEGATIVE for masses, tenderness or discharge  CV: NEGATIVE for chest pain, palpitations or peripheral edema  GI: NEGATIVE for nausea, abdominal pain, heartburn, or change in bowel habits  : NEGATIVE for unusual urinary or vaginal symptoms. Periods are regular.  M: NEGATIVE for significant arthralgias or myalgia  N: NEGATIVE for weakness, dizziness or paresthesias  PSYCHIATRIC: as above      OBJECTIVE:   /60  Pulse 112  Temp 98.5  F (36.9  C) (Tympanic)  Resp 16  Ht 5' 2\" (1.575 m)  Wt 139 lb (63 kg)  LMP  (LMP Unknown)  SpO2 99%  Breastfeeding? No  BMI 25.42 kg/m2  Physical " Exam  GENERAL: healthy, alert and no distress  EYES: Eyes grossly normal to inspection, PERRL and conjunctivae and sclerae normal  HENT: ear canals and TM's normal, nose and mouth without ulcers or lesions  NECK: no adenopathy, no asymmetry, masses, or scars and thyroid normal to palpation  RESP: lungs clear to auscultation - no rales, rhonchi or wheezes  CV: regular rate and rhythm, normal S1 S2, no S3 or S4, no murmur, click or rub, no peripheral edema and peripheral pulses strong  ABDOMEN: soft, nontender, no hepatosplenomegaly, no masses and bowel sounds normal   (female): normal female external genitalia, normal urethral meatus, vaginal mucosa pink, moist, well rugated, and normal cervix/adnexa/uterus without masses or discharge  MS: no gross musculoskeletal defects noted, no edema  SKIN: no suspicious lesions or rashes  NEURO: Normal strength and tone, mentation intact and speech normal  PSYCH: mentation appears normal, affect flat, tearful, judgement and insight intact and appearance well groomed    ASSESSMENT/PLAN:   1. Routine general medical examination at a health care facility    2. Screening for cervical cancer  - Pap imaged thin layer screen with HPV - recommended age 30 - 65  - HPV High Risk Types DNA Cervical    3. Leukocytosis, unspecified type  Will recheck labs today.    - CBC with platelets    4. Severe episode of recurrent major depressive disorder, without psychotic features (H)  Increased Effexor to 75 mg daily.  Follow up in 1-2 months for recheck.  Continue Remeron without change.  Start therapy as planned.   - venlafaxine (EFFEXOR-XR) 75 MG 24 hr capsule; Take 1 capsule (75 mg) by mouth daily  Dispense: 30 capsule; Refill: 1  - mirtazapine (REMERON) 15 MG tablet; Take 1 tablet (15 mg) by mouth At Bedtime  Dispense: 30 tablet; Refill: 1    COUNSELING:  Reviewed preventive health counseling, as reflected in patient instructions       Regular exercise       Healthy diet/nutrition          "reports that she has been smoking Cigarettes.  She has a 0.40 pack-year smoking history. She has never used smokeless tobacco.  Tobacco Cessation Action Plan: Information offered: Patient not interested at this time  Estimated body mass index is 25.42 kg/(m^2) as calculated from the following:    Height as of this encounter: 5' 2\" (1.575 m).    Weight as of this encounter: 139 lb (63 kg).   Weight management plan: Discussed healthy diet and exercise guidelines and patient will follow up in 12 months in clinic to re-evaluate.    Counseling Resources:  ATP IV Guidelines  Pooled Cohorts Equation Calculator  Breast Cancer Risk Calculator  FRAX Risk Assessment  ICSI Preventive Guidelines  Dietary Guidelines for Americans, 2010  USDA's MyPlate  ASA Prophylaxis  Lung CA Screening    In addition to the preventive visit, 15  minutes of the appointment were spent evaluating and developing a treatment plan for her additional concern(s).        GINA Diehl [facility]  "

## 2018-04-05 NOTE — TELEPHONE ENCOUNTER
Left msg to call clinic for results. Please give info per leslee's note.  ................Bora Culp LPN,   April 5, 2018,      4:39 PM,   [facility]

## 2018-04-06 ASSESSMENT — PATIENT HEALTH QUESTIONNAIRE - PHQ9: SUM OF ALL RESPONSES TO PHQ QUESTIONS 1-9: 10

## 2018-04-09 LAB
COPATH REPORT: NORMAL
PAP: NORMAL

## 2018-04-09 NOTE — TELEPHONE ENCOUNTER
No return call so I sent a letter.  ................Bora Culp LPN,   April 9, 2018,      12:15 PM,   [facility]

## 2018-04-11 LAB
FINAL DIAGNOSIS: NORMAL
HPV HR 12 DNA CVX QL NAA+PROBE: NEGATIVE
HPV16 DNA SPEC QL NAA+PROBE: NEGATIVE
HPV18 DNA SPEC QL NAA+PROBE: NEGATIVE
SPECIMEN DESCRIPTION: NORMAL
SPECIMEN SOURCE CVX/VAG CYTO: NORMAL

## 2018-06-14 ENCOUNTER — OFFICE VISIT (OUTPATIENT)
Dept: FAMILY MEDICINE | Facility: OTHER | Age: 38
End: 2018-06-14
Payer: COMMERCIAL

## 2018-06-14 VITALS
SYSTOLIC BLOOD PRESSURE: 110 MMHG | BODY MASS INDEX: 24.87 KG/M2 | RESPIRATION RATE: 16 BRPM | HEART RATE: 80 BPM | DIASTOLIC BLOOD PRESSURE: 64 MMHG | WEIGHT: 136 LBS | TEMPERATURE: 97 F

## 2018-06-14 DIAGNOSIS — R30.0 DYSURIA: Primary | ICD-10-CM

## 2018-06-14 LAB
BACTERIA #/AREA URNS HPF: ABNORMAL /HPF
HYALINE CASTS #/AREA URNS LPF: ABNORMAL /LPF
MUCOUS THREADS #/AREA URNS LPF: PRESENT /LPF
NON-SQ EPI CELLS #/AREA URNS LPF: ABNORMAL /LPF
RBC #/AREA URNS AUTO: ABNORMAL /HPF
WBC #/AREA URNS AUTO: ABNORMAL /HPF

## 2018-06-14 PROCEDURE — 81015 MICROSCOPIC EXAM OF URINE: CPT | Performed by: PHYSICIAN ASSISTANT

## 2018-06-14 PROCEDURE — 99213 OFFICE O/P EST LOW 20 MIN: CPT | Performed by: PHYSICIAN ASSISTANT

## 2018-06-14 PROCEDURE — 87491 CHLMYD TRACH DNA AMP PROBE: CPT | Performed by: PHYSICIAN ASSISTANT

## 2018-06-14 PROCEDURE — 87591 N.GONORRHOEAE DNA AMP PROB: CPT | Performed by: PHYSICIAN ASSISTANT

## 2018-06-14 RX ORDER — NITROFURANTOIN 25; 75 MG/1; MG/1
100 CAPSULE ORAL 2 TIMES DAILY
Qty: 14 CAPSULE | Refills: 0 | Status: SHIPPED | OUTPATIENT
Start: 2018-06-14 | End: 2018-07-11

## 2018-06-14 RX ORDER — MELOXICAM 15 MG/1
15 TABLET ORAL DAILY
Qty: 14 TABLET | Refills: 0 | Status: SHIPPED | OUTPATIENT
Start: 2018-06-14 | End: 2018-07-11

## 2018-06-14 ASSESSMENT — PAIN SCALES - GENERAL: PAINLEVEL: SEVERE PAIN (6)

## 2018-06-14 NOTE — MR AVS SNAPSHOT
After Visit Summary   6/14/2018    Nicole [place]    MRN: [id number]           Patient Information     Date Of Birth          1980        Visit Information        Provider Department      6/14/2018 2:20 PM Win Reyna PA-C [facility]        Today's Diagnoses     Dysuria    -  1      Care Instructions    1. Macrobid 100mg twice daily for 7 days.   2. Mobic 15mg 1/2 to 1 tablet daily as needed for pain.    - No NSAIDs with this medication.    - May use tylenol.           Follow-ups after your visit        Who to contact     If you have questions or need follow up information about today's clinic visit or your schedule please contact [facility] directly at [phone].  Normal or non-critical lab and imaging results will be communicated to you by MyChart, letter or phone within 4 business days after the clinic has received the results. If you do not hear from us within 7 days, please contact the clinic through [employer] or phone. If you have a critical or abnormal lab result, we will notify you by phone as soon as possible.  Submit refill requests through [employer] or call your pharmacy and they will forward the refill request to us. Please allow 3 business days for your refill to be completed.          Additional Information About Your Visit        Care EveryWhere ID     This is your Care EveryWhere ID. This could be used by other organizations to access your [place] medical records  [id number]        Your Vitals Were     Pulse Temperature Respirations BMI (Body Mass Index)          80 97  F (36.1  C) (Oral) 16 24.87 kg/m2         Blood Pressure from Last 3 Encounters:   06/14/18 110/64   04/05/18 104/60   03/08/18 122/80    Weight from Last 3 Encounters:   06/14/18 136 lb (61.7 kg)   04/05/18 139 lb (63 kg)   03/08/18 131 lb 4.8 oz (59.6 kg)              We Performed the Following     Urine Microscopic          Today's Medication Changes          These changes are  accurate as of 6/14/18  2:52 PM.  If you have any questions, ask your nurse or doctor.               Start taking these medicines.        Dose/Directions    meloxicam 15 MG tablet   Commonly known as:  MOBIC   Used for:  Dysuria   Started by:  Win Reyna PA-C        Dose:  15 mg   Take 1 tablet (15 mg) by mouth daily   Quantity:  14 tablet   Refills:  0       nitroFURantoin (macrocrystal-monohydrate) 100 MG capsule   Commonly known as:  MACROBID   Used for:  Dysuria   Started by:  Win Reyna PA-C        Dose:  100 mg   Take 1 capsule (100 mg) by mouth 2 times daily   Quantity:  14 capsule   Refills:  0            Where to get your medicines      These medications were sent to [facility] [address]   [address] [facility] [address]     Phone:  [phone]     meloxicam 15 MG tablet    nitroFURantoin (macrocrystal-monohydrate) 100 MG capsule                Primary Care Provider Office Phone # Fax #    GINA Diehl [phone]       [address] [facility] [address]        Equal Access to Services     [facility] Hospital[facility]IS : Hadii aad ku hadasho Soomaali, waaxda luqadaha, qaybta kaalmada adeegyada, waxay idiin hayaan ashley mcpherson . So [facility] [phone].    ATENCIÓN: Si habla español, tiene a bai disposición servicios gratuitos de asistencia lingüística. Llame al [phone].    We comply with applicable federal civil rights laws and [place] laws. We do not discriminate on the basis of race, color, national origin, age, disability, sex, sexual orientation, or gender identity.            Thank you!     Thank you for choosing [facility]  for your care. Our goal is always to provide you with excellent care. Hearing back from our patients is one way we can continue to improve our services. Please take a few minutes to complete the written survey that you may receive in the mail after your visit with us. Thank you!             Your  Updated Medication List - Protect others around you: Learn how to safely use, store and throw away your medicines at www.disposemymeds.org.          This list is accurate as of 6/14/18  2:52 PM.  Always use your most recent med list.                   Brand Name Dispense Instructions for use Diagnosis    meloxicam 15 MG tablet    MOBIC    14 tablet    Take 1 tablet (15 mg) by mouth daily    Dysuria       mirtazapine 15 MG tablet    REMERON    30 tablet    TAKE ONE TABLET BY MOUTH AT BEDTIME    Severe episode of recurrent major depressive disorder, without psychotic features (H)       nitroFURantoin (macrocrystal-monohydrate) 100 MG capsule    MACROBID    14 capsule    Take 1 capsule (100 mg) by mouth 2 times daily    Dysuria       venlafaxine 75 MG 24 hr capsule    EFFEXOR-XR    30 capsule    TAKE ONE CAPSULE BY MOUTH ONCE DAILY    Severe episode of recurrent major depressive disorder, without psychotic features (H)

## 2018-06-14 NOTE — LETTER
Cindi 15, 2018      Nicole Phoenix  [address]  [place] [address]        Dear ,    We are writing to inform you of your test results.    Your chlamydia and gonorrhea tests were negative.    Resulted Orders   Urine Microscopic   Result Value Ref Range    WBC Urine 0 - 5 OTO5^0 - 5 /HPF    RBC Urine O - 2 OTO2^O - 2 /HPF    Hyaline Casts 2-5 (A) OTO2^O - 2 /LPF    Squamous Epithelial /LPF Urine Many (A) FEW^Few /LPF    Bacteria Urine Moderate (A) NEG^Negative /HPF    Mucous Urine Present (A) NEG^Negative /LPF   Chlamydia trachomatis PCR   Result Value Ref Range    Specimen Description Vagina     Chlamydia Trachomatis PCR Negative NEG^Negative      Comment:      Negative for C. trachomatis rRNA by transcription mediated amplification.  A negative result by transcription mediated amplification does not preclude   the presence of C. trachomatis infection because results are dependent on   proper and adequate collection, absence of inhibitors, and sufficient rRNA to   be detected.     Neisseria gonorrhoeae PCR   Result Value Ref Range    Specimen Descrip Vagina     N Gonorrhea PCR Negative NEG^Negative      Comment:      Negative for N. gonorrhoeae rRNA by transcription mediated amplification.  A negative result by transcription mediated amplification does not preclude   the presence of N. gonorrhoeae infection because results are dependent on   proper and adequate collection, absence of inhibitors, and sufficient rRNA to   be detected.         If you have any questions or concerns, please call the clinic at the number listed above.       Sincerely,        Win Reyna PA-C

## 2018-06-14 NOTE — PATIENT INSTRUCTIONS
1. Macrobid 100mg twice daily for 7 days.   2. Mobic 15mg 1/2 to 1 tablet daily as needed for pain.    - No NSAIDs with this medication.    - May use tylenol.

## 2018-06-15 LAB
C TRACH DNA SPEC QL NAA+PROBE: NEGATIVE
N GONORRHOEA DNA SPEC QL NAA+PROBE: NEGATIVE
SPECIMEN SOURCE: NORMAL
SPECIMEN SOURCE: NORMAL

## 2018-07-11 ENCOUNTER — OFFICE VISIT (OUTPATIENT)
Dept: FAMILY MEDICINE | Facility: OTHER | Age: 38
End: 2018-07-11
Payer: COMMERCIAL

## 2018-07-11 ENCOUNTER — TELEPHONE (OUTPATIENT)
Dept: FAMILY MEDICINE | Facility: OTHER | Age: 38
End: 2018-07-11

## 2018-07-11 VITALS
WEIGHT: 137 LBS | BODY MASS INDEX: 25.06 KG/M2 | TEMPERATURE: 96.8 F | HEART RATE: 70 BPM | RESPIRATION RATE: 16 BRPM | OXYGEN SATURATION: 99 % | SYSTOLIC BLOOD PRESSURE: 112 MMHG | DIASTOLIC BLOOD PRESSURE: 70 MMHG

## 2018-07-11 DIAGNOSIS — F33.2 SEVERE EPISODE OF RECURRENT MAJOR DEPRESSIVE DISORDER, WITHOUT PSYCHOTIC FEATURES (H): Primary | ICD-10-CM

## 2018-07-11 PROCEDURE — 99213 OFFICE O/P EST LOW 20 MIN: CPT | Performed by: NURSE PRACTITIONER

## 2018-07-11 RX ORDER — VENLAFAXINE HYDROCHLORIDE 75 MG/1
CAPSULE, EXTENDED RELEASE ORAL
Qty: 90 CAPSULE | Refills: 1 | Status: SHIPPED | OUTPATIENT
Start: 2018-07-11 | End: 2019-09-17

## 2018-07-11 RX ORDER — MIRTAZAPINE 15 MG/1
TABLET, FILM COATED ORAL
Qty: 90 TABLET | Refills: 1 | Status: SHIPPED | OUTPATIENT
Start: 2018-07-11 | End: 2019-11-12

## 2018-07-11 NOTE — TELEPHONE ENCOUNTER
Attempted to call the patient at [phone] (home), no answer. Left message for a return call to the clinic when available.     Talia Haro RN  [facility]

## 2018-07-11 NOTE — PROGRESS NOTES
SUBJECTIVE:   Nicole Phoenix is a 38 year old female who presents to clinic today for the following health issues:      Depression Followup    Status since last visit: Stable but worse with menses    See PHQ-9 for current symptoms.  Other associated symptoms: None    Complicating factors:   Significant life event:  No   Current substance abuse:  None  Anxiety or Panic symptoms:  Yes-  Very rare but feels it might be hormonal    PHQ-9 3/8/2018 2018   Total Score 18 10   Q9: Suicide Ideation Several days Not at all       Increased her Effexor to 75 mg daily 3 months ago.  Also on Remeron.  This has been helpful.  Symptoms are worse during menses.  Previously on oral contraceptives and those were helpful for her mood symptoms but were stopped because she continues to smoke.         Problem list and histories reviewed & adjusted, as indicated.  Additional history: as documented    Patient Active Problem List   Diagnosis     Fibromyalgia     Intractable chronic migraine without aura and without status migrainosus     Syncope, unspecified syncope type     Neck muscle spasm     Cervicalgia     Cervical high risk HPV (human papillomavirus) test positive     Contact dermatitis due to poison ivy     Severe episode of recurrent major depressive disorder, without psychotic features (H)     Past Surgical History:   Procedure Laterality Date     C  DELIVERY+POSTPARTUM CARE       HC REMOVAL OF TONSILS,<11 Y/O       HC REPAIR EXTEN TENDON FINGER W/O GRAFT, EACH  03/15/09    right ring finger       Social History   Substance Use Topics     Smoking status: Current Some Day Smoker     Packs/day: 0.10     Years: 4.00     Types: Cigarettes     Smokeless tobacco: Never Used     Alcohol use No     Family History   Problem Relation Age of Onset     Breast Cancer Maternal Grandmother      Alcohol/Drug Maternal Grandfather      recovering alcoholic     Diabetes Paternal Grandmother      Cerebrovascular Disease Paternal  Grandmother          Current Outpatient Prescriptions   Medication Sig Dispense Refill     mirtazapine (REMERON) 15 MG tablet TAKE ONE TABLET BY MOUTH AT BEDTIME 30 tablet 1     venlafaxine (EFFEXOR-XR) 75 MG 24 hr capsule TAKE ONE CAPSULE BY MOUTH ONCE DAILY 30 capsule 0     Allergies   Allergen Reactions     No Known Drug Allergies      Poison Ivy Extract [Extract Of Poison Ivy]      BP Readings from Last 3 Encounters:   07/11/18 112/70   06/14/18 110/64   04/05/18 104/60    Wt Readings from Last 3 Encounters:   07/11/18 137 lb (62.1 kg)   06/14/18 136 lb (61.7 kg)   04/05/18 139 lb (63 kg)                    Reviewed and updated as needed this visit by clinical staff  Tobacco  Allergies  Meds  Med Hx  Surg Hx  Fam Hx  Soc Hx      Reviewed and updated as needed this visit by Provider         ROS:  Constitutional, HEENT, cardiovascular, pulmonary, gi and gu systems are negative, except as otherwise noted.    OBJECTIVE:     /70  Pulse 70  Temp 96.8  F (36  C) (Tympanic)  Resp 16  Wt 137 lb (62.1 kg)  LMP 07/07/2018  SpO2 99%  Breastfeeding? No  BMI 25.06 kg/m2  Body mass index is 25.06 kg/(m^2).  GENERAL: healthy, alert and no distress  RESP: lungs clear to auscultation - no rales, rhonchi or wheezes  CV: regular rate and rhythm, normal S1 S2, no S3 or S4, no murmur, click or rub  MS: no gross musculoskeletal defects noted, no edema  PSYCH: mentation appears normal, affect normal/bright, judgement and insight intact and appearance well groomed    Diagnostic Test Results:  none     ASSESSMENT/PLAN:         1. Severe episode of recurrent major depressive disorder, without psychotic features (H)  Chronic, controlled.  No change in treatment plan. Follow up in 6 months for recheck.  Discussed smoking cessation and the use of oral contraceptives for mood management if she is able to do that.  She declined any help at this point.   - venlafaxine (EFFEXOR-XR) 75 MG 24 hr capsule; TAKE ONE CAPSULE BY  MOUTH ONCE DAILY  Dispense: 90 capsule; Refill: 1  - mirtazapine (REMERON) 15 MG tablet; TAKE ONE TABLET BY MOUTH AT BEDTIME  Dispense: 90 tablet; Refill: 1    See Patient Instructions    GINA Diehl [facility]

## 2018-07-11 NOTE — TELEPHONE ENCOUNTER
Reason for Call:  Medication or medication refill:    Do you use a [facility]?  Name of the pharmacy and phone number for the current request:  [facility] - [phone]    Name of the medication requested: something for headache, pain is persisting    Other request: please advise.     Can we leave a detailed message on this number? YES    Phone number patient can be reached at: Home number on file [phone] (home)    Best Time: as soon as able    Call taken on 7/11/2018 at 12:23 PM by Radha Rosenberg

## 2018-07-11 NOTE — MR AVS SNAPSHOT
After Visit Summary   7/11/2018    Nicole Phoenix    MRN: [id number]           Patient Information     Date Of Birth          1980        Visit Information        Provider Department      7/11/2018 7:40 AM Cynthia Mukherjee APRN CNP [facility]        Today's Diagnoses     Severe episode of recurrent major depressive disorder, without psychotic features (H)    -  1      Care Instructions    I refilled your medications for a 3 month supply with a refill.     Follow up in 6 months.               Follow-ups after your visit        Who to contact     If you have questions or need follow up information about today's clinic visit or your schedule please contact [facility] directly at [phone].  Normal or non-critical lab and imaging results will be communicated to you by MyChart, letter or phone within 4 business days after the clinic has received the results. If you do not hear from us within 7 days, please contact the clinic through MyChart or phone. If you have a critical or abnormal lab result, we will notify you by phone as soon as possible.  Submit refill requests through [employer] or call your pharmacy and they will forward the refill request to us. Please allow 3 business days for your refill to be completed.          Additional Information About Your Visit        Care EveryWhere ID     This is your Care EveryWhere ID. This could be used by other organizations to access your [place] medical records  [id number]        Your Vitals Were     Pulse Temperature Respirations Last Period Pulse Oximetry Breastfeeding?    70 96.8  F (36  C) (Tympanic) 16 07/07/2018 99% No    BMI (Body Mass Index)                   25.06 kg/m2            Blood Pressure from Last 3 Encounters:   07/11/18 112/70   06/14/18 110/64   04/05/18 104/60    Weight from Last 3 Encounters:   07/11/18 137 lb (62.1 kg)   06/14/18 136 lb (61.7 kg)   04/05/18 139 lb (63 kg)              Today, you had the  following     No orders found for display         Where to get your medicines      These medications were sent to [facility] [place] - [address]  [address] , [facility] [address]     Phone:  [phone]     mirtazapine 15 MG tablet    venlafaxine 75 MG 24 hr capsule          Primary Care Provider Office Phone # Fax #    GINA Diehl [phone]       [address] [facility] [address]        Equal Access to Services     AFSHAN PINEDO : Hadii aad ku hadasho Soomaali, waaxda luqadaha, qaybta kaalmada adeegyada, waxay idiin hayaan adeeg kharash vida . So [facility] [phone].    ATENCIÓN: Si habla español, tiene a bai disposición servicios gratuitos de asistencia lingüística. Llame al [phone].    We comply with applicable federal civil rights laws and [place] laws. We do not discriminate on the basis of race, color, national origin, age, disability, sex, sexual orientation, or gender identity.            Thank you!     Thank you for choosing [facility]  for your care. Our goal is always to provide you with excellent care. Hearing back from our patients is one way we can continue to improve our services. Please take a few minutes to complete the written survey that you may receive in the mail after your visit with us. Thank you!             Your Updated Medication List - Protect others around you: Learn how to safely use, store and throw away your medicines at www.disposemymeds.org.          This list is accurate as of 7/11/18  8:10 AM.  Always use your most recent med list.                   Brand Name Dispense Instructions for use Diagnosis    mirtazapine 15 MG tablet    REMERON    90 tablet    TAKE ONE TABLET BY MOUTH AT BEDTIME    Severe episode of recurrent major depressive disorder, without psychotic features (H)       venlafaxine 75 MG 24 hr capsule    EFFEXOR-XR    90 capsule    TAKE ONE CAPSULE BY MOUTH ONCE DAILY    Severe episode of recurrent  major depressive disorder, without psychotic features (H)

## 2018-07-12 ASSESSMENT — PATIENT HEALTH QUESTIONNAIRE - PHQ9: SUM OF ALL RESPONSES TO PHQ QUESTIONS 1-9: 5

## 2018-07-12 NOTE — TELEPHONE ENCOUNTER
Attempted to call the patient at [phone] (home), no answer. Left message for a return call to the clinic when available.     Talia Haro RN  [facility]

## 2018-07-13 NOTE — TELEPHONE ENCOUNTER
Attempted to call the patient at [phone] (home), no answer. Left message for a return call to the clinic when available.     Talia Haro RN  [facility]

## 2018-07-16 NOTE — TELEPHONE ENCOUNTER
No call back after 3 attempts.     Closing encounter.     Talia Haro RN  [facility]

## 2018-12-27 NOTE — PATIENT INSTRUCTIONS
Dr Emerald Latham to suture drain at bedside with OR staff member I will get some lab work today and will have you start some medications to help with sleep and depression symptoms. I will refer you to counseling, they will call you to schedule. I would encourage you to keep a schedule to your day and avoid sleeping for more than 9 hours in a 24 hour period. Follow up if worsening symptoms or not tolerating medications and follow up with your primary provider in 4-5 weeks for a recheck and refill of medications.

## 2019-03-29 ENCOUNTER — TELEPHONE (OUTPATIENT)
Dept: FAMILY MEDICINE | Facility: OTHER | Age: 39
End: 2019-03-29

## 2019-03-29 ENCOUNTER — OFFICE VISIT (OUTPATIENT)
Dept: FAMILY MEDICINE | Facility: OTHER | Age: 39
End: 2019-03-29
Payer: COMMERCIAL

## 2019-03-29 VITALS
TEMPERATURE: 98.6 F | RESPIRATION RATE: 16 BRPM | SYSTOLIC BLOOD PRESSURE: 120 MMHG | WEIGHT: 140 LBS | HEART RATE: 82 BPM | OXYGEN SATURATION: 99 % | BODY MASS INDEX: 25.61 KG/M2 | DIASTOLIC BLOOD PRESSURE: 84 MMHG

## 2019-03-29 DIAGNOSIS — R10.9 FLANK PAIN: Primary | ICD-10-CM

## 2019-03-29 LAB
ALBUMIN UR-MCNC: NEGATIVE MG/DL
APPEARANCE UR: CLEAR
BACTERIA #/AREA URNS HPF: ABNORMAL /HPF
BILIRUB UR QL STRIP: NEGATIVE
COLOR UR AUTO: YELLOW
GLUCOSE UR STRIP-MCNC: NEGATIVE MG/DL
HGB UR QL STRIP: ABNORMAL
KETONES UR STRIP-MCNC: NEGATIVE MG/DL
LEUKOCYTE ESTERASE UR QL STRIP: NEGATIVE
NITRATE UR QL: NEGATIVE
NON-SQ EPI CELLS #/AREA URNS LPF: ABNORMAL /LPF
PH UR STRIP: 7 PH (ref 5–7)
RBC #/AREA URNS AUTO: ABNORMAL /HPF
SOURCE: ABNORMAL
SP GR UR STRIP: 1.01 (ref 1–1.03)
UROBILINOGEN UR STRIP-ACNC: 0.2 EU/DL (ref 0.2–1)
WBC #/AREA URNS AUTO: ABNORMAL /HPF

## 2019-03-29 PROCEDURE — 99213 OFFICE O/P EST LOW 20 MIN: CPT | Performed by: NURSE PRACTITIONER

## 2019-03-29 PROCEDURE — 81001 URINALYSIS AUTO W/SCOPE: CPT | Performed by: NURSE PRACTITIONER

## 2019-03-29 RX ORDER — OXYCODONE AND ACETAMINOPHEN 5; 325 MG/1; MG/1
1 TABLET ORAL EVERY 6 HOURS PRN
Qty: 20 TABLET | Refills: 0 | Status: SHIPPED | OUTPATIENT
Start: 2019-03-29 | End: 2019-09-24

## 2019-03-29 RX ORDER — TAMSULOSIN HYDROCHLORIDE 0.4 MG/1
0.4 CAPSULE ORAL DAILY
Qty: 10 CAPSULE | Refills: 0 | Status: SHIPPED | OUTPATIENT
Start: 2019-03-29 | End: 2019-09-24

## 2019-03-29 ASSESSMENT — PAIN SCALES - GENERAL: PAINLEVEL: MODERATE PAIN (5)

## 2019-03-29 ASSESSMENT — PATIENT HEALTH QUESTIONNAIRE - PHQ9: SUM OF ALL RESPONSES TO PHQ QUESTIONS 1-9: 7

## 2019-03-29 NOTE — TELEPHONE ENCOUNTER
Reason for Call: Request for an order or referral:    Order or referral being requested: CT     Date needed: as soon as possible    Has the patient been seen by the PCP for this problem? YES    Additional comments: Pt can't get in with Urology on Monday, wondering if you ca place the CT order for Monday to see if stone passed? Please call and advise.     Phone number Patient can be reached at:  Home number on file [phone] (home)    Best Time:  anytime    Can we leave a detailed message on this number?  YES    Call taken on 3/29/2019 at 11:20 AM by Staci Gunderson

## 2019-03-29 NOTE — PROGRESS NOTES
SUBJECTIVE:   Nicole Phoenix is a 39 year old female who presents to clinic today for the following health issues:      Chief Complaint   Patient presents with     Flank Pain     right flank pain for three days - Hx of kidney stones         URINARY TRACT SYMPTOMS      Duration: 3 days    Description  Flank pain, pelvic pressure    Intensity:  moderate    Accompanying signs and symptoms:  Fever/chills: YES - low grade   Flank pain YES - right side only  Nausea and vomiting: YES  Vaginal symptoms: none  Abdominal/Pelvic Pain: YES - pelvic pressure     History  History of frequent UTI's: no   History of kidney stones: YES- last one was in November.  This had to be treated with lithotripsy.  She states this feels similar  Sexually Active: YES  Possibility of pregnancy: No    Precipitating or alleviating factors: None    Therapies tried and outcome: none       No dysuria, hematuria, frequency or hesitancy.  Has felt achy, but has only had low grade fevers around 99.0.        Problem list and histories reviewed & adjusted, as indicated.  Additional history: as documented    Patient Active Problem List   Diagnosis     Fibromyalgia     Intractable chronic migraine without aura and without status migrainosus     Syncope, unspecified syncope type     Neck muscle spasm     Cervicalgia     Cervical high risk HPV (human papillomavirus) test positive     Contact dermatitis due to poison ivy     Severe episode of recurrent major depressive disorder, without psychotic features (H)     Past Surgical History:   Procedure Laterality Date     C  DELIVERY+POSTPARTUM CARE       HC REMOVAL OF TONSILS,<11 Y/O       HC REPAIR EXTEN TENDON FINGER W/O GRAFT, EACH  03/15/09    right ring finger       Social History     Tobacco Use     Smoking status: Current Some Day Smoker     Packs/day: 0.10     Years: 4.00     Pack years: 0.40     Types: Cigarettes     Smokeless tobacco: Never Used   Substance Use Topics     Alcohol use: No      Family History   Problem Relation Age of Onset     Breast Cancer Maternal Grandmother      Alcohol/Drug Maternal Grandfather         recovering alcoholic     Diabetes Paternal Grandmother      Cerebrovascular Disease Paternal Grandmother          Current Outpatient Medications   Medication Sig Dispense Refill     mirtazapine (REMERON) 15 MG tablet TAKE ONE TABLET BY MOUTH AT BEDTIME (Patient not taking: Reported on 3/29/2019) 90 tablet 1     venlafaxine (EFFEXOR-XR) 75 MG 24 hr capsule TAKE ONE CAPSULE BY MOUTH ONCE DAILY (Patient not taking: Reported on 3/29/2019) 90 capsule 1     Allergies   Allergen Reactions     No Known Drug Allergies      Poison Ivy Extract [Extract Of Poison Ivy]      BP Readings from Last 3 Encounters:   03/29/19 120/84   07/11/18 112/70   06/14/18 110/64    Wt Readings from Last 3 Encounters:   03/29/19 63.5 kg (140 lb)   07/11/18 62.1 kg (137 lb)   06/14/18 61.7 kg (136 lb)                    Reviewed and updated as needed this visit by clinical staff  Tobacco  Allergies  Meds       Reviewed and updated as needed this visit by Provider         ROS:  Constitutional, HEENT, cardiovascular, pulmonary, gi and gu systems are negative, except as otherwise noted.    OBJECTIVE:     /84 (Cuff Size: Adult Regular)   Pulse 82   Temp 98.6  F (37  C) (Temporal)   Resp 16   Wt 63.5 kg (140 lb)   SpO2 99%   BMI 25.61 kg/m    Body mass index is 25.61 kg/m .  GENERAL: healthy, alert and no distress  NECK: no adenopathy, no asymmetry, masses, or scars and thyroid normal to palpation  RESP: lungs clear to auscultation - no rales, rhonchi or wheezes  CV: regular rate and rhythm, normal S1 S2, no S3 or S4, no murmur, click or rub, no peripheral edema and peripheral pulses strong  ABDOMEN: soft, nontender, without hepatosplenomegaly or masses, bowel sounds normal and right sided flank pain  MS: no gross musculoskeletal defects noted, no edema    Diagnostic Test Results:  Results for orders placed  or performed in visit on 03/29/19 (from the past 24 hour(s))   *UA reflex to Microscopic and Culture ([facility] (except [place] and [place])   Result Value Ref Range    Color Urine Yellow     Appearance Urine Clear     Glucose Urine Negative NEG^Negative mg/dL    Bilirubin Urine Negative NEG^Negative    Ketones Urine Negative NEG^Negative mg/dL    Specific Gravity Urine 1.010 1.003 - 1.035    Blood Urine Small (A) NEG^Negative    pH Urine 7.0 5.0 - 7.0 pH    Protein Albumin Urine Negative NEG^Negative mg/dL    Urobilinogen Urine 0.2 0.2 - 1.0 EU/dL    Nitrite Urine Negative NEG^Negative    Leukocyte Esterase Urine Negative NEG^Negative    Source Unspecified Urine    Urine Microscopic   Result Value Ref Range    WBC Urine 0 - 5 OTO5^0 - 5 /HPF    RBC Urine 2-5 (A) OTO2^O - 2 /HPF    Squamous Epithelial /LPF Urine Moderate (A) FEW^Few /LPF    Bacteria Urine Few (A) NEG^Negative /HPF       ASSESSMENT/PLAN:         1. Flank pain  She has no urinary symptoms and her UA shows only blood and few bacteria, which are likely contamination.  I suspect renal stone.  Discussed with her doing a CT scan for definitive diagnosis, verses treating as such to see if she can pass this.  She would prefer to wait on the CT for now and treat over the weekend with pain medications and Flomax to see if she can pass this.  She has a Urologist through [place] she will call Monday if she has not passed this. Discussed indications to return to care over the weekend such as worsening pain, fevers or any other new symptoms.  She understands and agrees to this plan.   - *UA reflex to Microscopic and Culture ([facility] (except [place] and [place])  - Urine Microscopic  - oxyCODONE-acetaminophen (PERCOCET) 5-325 MG tablet; Take 1 tablet by mouth every 6 hours as needed for pain  Dispense: 20 tablet; Refill: 0  - tamsulosin (FLOMAX) 0.4 MG capsule; Take 1 capsule (0.4 mg) by mouth daily  Dispense: 10  capsule; Refill: 0    See Patient Instructions    GINA Diehl CNP  [facility]

## 2019-03-29 NOTE — PATIENT INSTRUCTIONS
Take the Flomax once a day    Percocet as needed for pain    Call me or the Urologist Monday if you are not improving or haven't passed it    If you get severe pain, high fevers or other symptoms over the weekend, you have to go to ER for a CT scan.

## 2019-03-29 NOTE — TELEPHONE ENCOUNTER
I placed an order for a CT.  She should have it done if she hasn't passed the stone by Monday.  Not sure if we should have her put on the schedule and then cancel if not needed? Or try to fit her in Monday if needed?  Can we check with Radiology and see what they would prefer?    Electronically signed by Cynthia Mukherjee CNP.

## 2019-04-01 ENCOUNTER — HOSPITAL ENCOUNTER (OUTPATIENT)
Dept: CT IMAGING | Facility: CLINIC | Age: 39
Discharge: HOME | End: 2019-04-01
Attending: NURSE PRACTITIONER | Admitting: NURSE PRACTITIONER
Payer: COMMERCIAL

## 2019-04-01 ENCOUNTER — TELEPHONE (OUTPATIENT)
Dept: FAMILY MEDICINE | Facility: OTHER | Age: 39
End: 2019-04-01

## 2019-04-01 DIAGNOSIS — R10.2 PELVIC PAIN IN FEMALE: Primary | ICD-10-CM

## 2019-04-01 DIAGNOSIS — R10.9 FLANK PAIN: ICD-10-CM

## 2019-04-01 PROCEDURE — 74176 CT ABD & PELVIS W/O CONTRAST: CPT

## 2019-04-01 NOTE — TELEPHONE ENCOUNTER
Reason for Call:  Request for results:    Name of test or procedure: CT results    Date of test of procedure: today 4/1    Location of the test or procedure: [facility]    OK to leave the result message on voice mail or with a family member? YES    Phone number Patient can be reached at:  Home number on file [phone] (home)    Additional comments: any time, is aware results are in preliminary status    Call taken on 4/1/2019 at 3:39 PM by Radha Rosenberg

## 2019-04-01 NOTE — TELEPHONE ENCOUNTER
Patient calls to check status of request.  Is transferred to radiology for scheduling assistance.

## 2019-04-01 NOTE — TELEPHONE ENCOUNTER
Still having groin, lower back and pelvic pain. Pain is currently at 4/10. Better then when she was last seen. Doesn't think she saw any stones. Routing to pcp to advise.     Mireya Espino MA

## 2019-04-01 NOTE — TELEPHONE ENCOUNTER
The preliminary report shows the following:                                                                    IMPRESSION:  1. Nonobstructive bilateral intrarenal calculi measure less than 0.2  cm of doubtful clinical significance.  2. No evidence for ureteral calculus, urinary system obstruction,  diverticulitis, or appendicitis to suggest etiology for patient's  symptoms.  3. Evaluation of solid organs of the abdomen and pelvis is limited due  to lack of IV contrast.  4. Probable dominant follicle right ovary measures up to 1.7 cm.  Pelvic ultrasound can be obtained for further evaluation as clinically  indicated. No definite abnormality of the uterus or ovaries is seen on  this study.    It did not show any obstructing kidney stones or anything else abnormal that would explain her pain.  It is possible she has already passed the stone.  How is her pain now?    Electronically signed by Cynthia Mukherjee CNP.

## 2019-04-02 NOTE — TELEPHONE ENCOUNTER
She should have a pelvic ultrasound to further this.  Orders placed.     Electronically signed by Cynthia Mukherjee CNP.

## 2019-05-22 ENCOUNTER — TELEPHONE (OUTPATIENT)
Dept: PHARMACY | Facility: OTHER | Age: 39
End: 2019-05-22

## 2019-05-22 NOTE — TELEPHONE ENCOUNTER
MTM referral from: Patient's insurance ([employer])    MTM referral outreach attempt #1 on May 22, 2019 at 12:00 PM      Outcome: Left Message    Nisreen Hopson MTM coordinator intern

## 2019-09-17 DIAGNOSIS — F33.2 SEVERE EPISODE OF RECURRENT MAJOR DEPRESSIVE DISORDER, WITHOUT PSYCHOTIC FEATURES (H): ICD-10-CM

## 2019-09-17 NOTE — TELEPHONE ENCOUNTER
Contacted patient informed that Cynthia is not in clinic today and that her appointment is 9/23 informed patient would send request to Cynthia Mukherjee for approval. Placed refill request with total of 7 day supply to get patient to appointment on 9/23.  Ludy Holt MA

## 2019-09-17 NOTE — TELEPHONE ENCOUNTER
Reason for call:  Other   Patient called regarding (reason for call): Pt said was called into work today, couldn't make appt, has one med left; please  call in more to [facility] until next appt  Additional comments: Pt also req callback to let her know when done so she can  today    Phone number to reach patient:  Home number on file [phone] (home)    Best Time:  any    Can we leave a detailed message on this number?  YES

## 2019-09-18 RX ORDER — VENLAFAXINE HYDROCHLORIDE 75 MG/1
CAPSULE, EXTENDED RELEASE ORAL
Qty: 7 CAPSULE | Refills: 0 | Status: SHIPPED | OUTPATIENT
Start: 2019-09-18 | End: 2019-09-24

## 2019-09-24 ENCOUNTER — OFFICE VISIT (OUTPATIENT)
Dept: FAMILY MEDICINE | Facility: OTHER | Age: 39
End: 2019-09-24
Payer: COMMERCIAL

## 2019-09-24 VITALS
WEIGHT: 136.7 LBS | TEMPERATURE: 98.1 F | SYSTOLIC BLOOD PRESSURE: 116 MMHG | RESPIRATION RATE: 16 BRPM | BODY MASS INDEX: 25.16 KG/M2 | HEIGHT: 62 IN | HEART RATE: 76 BPM | OXYGEN SATURATION: 97 % | DIASTOLIC BLOOD PRESSURE: 64 MMHG

## 2019-09-24 DIAGNOSIS — F33.2 SEVERE EPISODE OF RECURRENT MAJOR DEPRESSIVE DISORDER, WITHOUT PSYCHOTIC FEATURES (H): Primary | ICD-10-CM

## 2019-09-24 DIAGNOSIS — G47.9 SLEEP DIFFICULTIES: ICD-10-CM

## 2019-09-24 PROCEDURE — 99213 OFFICE O/P EST LOW 20 MIN: CPT | Performed by: PHYSICIAN ASSISTANT

## 2019-09-24 RX ORDER — VENLAFAXINE HYDROCHLORIDE 150 MG/1
CAPSULE, EXTENDED RELEASE ORAL
Qty: 30 CAPSULE | Refills: 1 | Status: SHIPPED | OUTPATIENT
Start: 2019-09-24 | End: 2019-11-14 | Stop reason: DRUGHIGH

## 2019-09-24 RX ORDER — ESZOPICLONE 1 MG/1
1 TABLET, FILM COATED ORAL
Qty: 30 TABLET | Refills: 1 | Status: SHIPPED | OUTPATIENT
Start: 2019-09-24 | End: 2020-05-01

## 2019-09-24 ASSESSMENT — MIFFLIN-ST. JEOR: SCORE: 1248.32

## 2019-09-24 ASSESSMENT — PAIN SCALES - GENERAL: PAINLEVEL: NO PAIN (0)

## 2019-09-24 ASSESSMENT — PATIENT HEALTH QUESTIONNAIRE - PHQ9: SUM OF ALL RESPONSES TO PHQ QUESTIONS 1-9: 8

## 2019-09-24 NOTE — PATIENT INSTRUCTIONS
"  Patient Education     Tips for Sleep Hygiene  \"Sleep hygiene\" means having good sleep habits.Follow these tips to sleep better at night:     Get on a schedule. Go to bed and get up at about the same time every day.    Listen to your body. Only try to sleep when you actually feel tired or sleepy.    Be patient. If you haven't been able to get to sleep after about 30 minutes or more, get up and do something calming or boring until you feel sleepy. Then return to bed and try again.    Don't have caffeine (coffee, tea, cola drinks, chocolate and some medicines), alcohol or nicotine (cigarettes). These can make it harder for you to fall asleep and stay asleep.    Use your bed for sleeping only. That means no TV, computer or homework in bed, especially during the evening and before bedtime.    Don't nap during the day. If you must nap, make sure it is for less than 20 minutes.    Create sleep rituals that remind your body it is time to sleep. Examples include breathing exercises, stretching or reading a book.    Avoid all electronic media (smart phone, computer, tablet) within 2 hours of bed time. The \"blue light\" in these devices activates the part of the brain that keeps you awake.    Dim the lights at night.    Get early morning sources of light (walk in the sunshine) to help set sleep patterns at night.    Try a bath or shower before bed. Having a warm bath 1 to 2 hours before bedtime can help you feel sleepy. Hot baths can make you alert, so be mindful of the temperature.    Don't watch the clock. Checking the clock during the night can wake you up. It can also lead to negative thoughts such as, \"I will never fall asleep,\" which can increase anxiety and sleeplessness.    Use a sleep diary. Track your sleep schedule to know your sleep patterns and to see where you can improve.    Get regular exercise every day. Try not to do heavy exercise in the 4 hours before bedtime.    Eat a healthy, balanced diet.    Try eating " a light, healthy snack before bed, but avoid eating a heavy meal.    Create the right sleeping area. A cool, dark, quiet room is best. If needed, try earplugs, fans and blackout curtains.    Keep your daytime routine the same even if you have a bad night sleep. Avoiding activities the next day can make it harder to sleep.  For informational purposes only. Not to replace the advice of your health care provider.   Copyright   2013 [employer]. All rights reserved. [employer] [id number] - 01/16.  For informational purposes only. Not to replace the advice of your health care provider.  Copyright   2018 [employer]. All rights reserved.

## 2019-09-24 NOTE — NURSING NOTE
Health Maintenance Due   Topic Date Due     PNEUMOCOCCAL IMMUNIZATION 19-64 MEDIUM RISK (1 of 1 - PPSV23) 02/25/1999     DTAP/TDAP/TD IMMUNIZATION (2 - Td) 03/15/2019     PREVENTIVE CARE VISIT  04/05/2019     INFLUENZA VACCINE (1) 09/01/2019     PHQ-9  09/29/2019     Abeba YA LPN

## 2019-09-24 NOTE — PROGRESS NOTES
Subjective     Nicole Phoenix is a 39 year old female who presents to clinic today for the following health issues:    HPI   Depression Followup    How are you doing with your depression since your last visit? Worsened     Are you having other symptoms that might be associated with depression? Yes:  sleep issues, social activities    Have you had a significant life event?  No     Are you feeling anxious or having panic attacks?   Yes:  ANXIETY    Do you have any concerns with your use of alcohol or other drugs? No    Social History     Tobacco Use     Smoking status: Current Some Day Smoker     Packs/day: 0.10     Years: 4.00     Pack years: 0.40     Types: Cigarettes     Smokeless tobacco: Never Used   Substance Use Topics     Alcohol use: No     Drug use: No     PHQ 7/11/2018 3/29/2019 9/24/2019   PHQ-9 Total Score 5 7 8   Q9: Thoughts of better off dead/self-harm past 2 weeks Not at all Not at all Not at all     RAYMOND-7 SCORE 3/8/2018   Total Score 9     No flowsheet data found.      Suicide Assessment Five-step Evaluation and Treatment (SAFE-T)      How many servings of fruits and vegetables do you eat daily?  2-3    On average, how many sweetened beverages do you drink each day (soda, juice, sweet tea, etc)?   2    How many days per week do you miss taking your medication? 0          Patient is a 39 year old female who presents today for follow up of depression. She has been managing symptoms with effexor and counseling and followed by [facility]. Last appointment was 07/2018. Today patient states that she feels that the effexor is not working as well, but also recognizes that she stopped the remeron and that may have affected the stability of her mood. Stopped the remeron due to weight gain, stated she felt she gained quite a bit of weight while using this medication. Records show that over the past year she has fluctuated 4lbs up/down. 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 She has since transitioned to nursing  which operates a 9-5 weekly and every other weekend. Finds that while the hours seem more at times, the structure is better. She identified coping skills through counseling such as praying, use of social supports and knowing when to step back from work/projects/commitments. Patient explained that she has a tendency to try to help and take on the problems of her patients despite how it affects her own mental/emotional health. Counselor in [place] has been quite helpful with recognizing this. She continues to have difficulty sleeping and states that she has used/uses a variety of treatments including nyquil, benadryl, melatonin. Patient often can fall asleep with the aid of the one of the aforementioned medications, but wakes during the night. Discussed the short term hypnotic options and the possible adverse effects as well as the importance of sleep hygiene to retrain the brain and associate the bed with feeling tired.         Reviewed and updated as needed this visit by Provider         Review of Systems   ROS COMP: Constitutional, HEENT, cardiovascular, pulmonary, gi and gu systems are negative, except as otherwise noted.      Objective    /64 (BP Location: Left arm, Patient Position: Chair, Cuff Size: Adult Regular)   Pulse 76   Temp 98.1  F (36.7  C) (Oral)   Resp 16   Ht 1.575 m (5' 2\")   Wt 62 kg (136 lb 11.2 oz)   SpO2 97%   BMI 25.00 kg/m    Body mass index is 25 kg/m .  Physical Exam   GENERAL: healthy, alert and no distress  RESP: lungs clear to auscultation - no rales, rhonchi or wheezes  CV: regular rate and rhythm, normal S1 S2, no S3 or S4, no murmur, click or rub, no peripheral edema and peripheral pulses strong  MS: no gross musculoskeletal defects noted, no edema  NEURO: Normal strength and tone, mentation intact and speech normal  PSYCH: mentation appears normal, affect " normal/bright    Diagnostic Test Results:  Labs reviewed in Epic        Assessment & Plan     1. Severe episode of recurrent major depressive disorder, without psychotic features (H)  Increase dose of effexor to 150mg daily and return to follow up in 4-8 weeks to assess tolerance and benefit. Denies thoughts of wanting to hurt self or others.   - venlafaxine (EFFEXOR-XR) 150 MG 24 hr capsule; TAKE ONE CAPSULE BY MOUTH ONCE DAILY  Dispense: 30 capsule; Refill: 1  - eszopiclone (LUNESTA) 1 MG tablet; Take 1 tablet (1 mg) by mouth nightly as needed for sleep  Dispense: 30 tablet; Refill: 1    2. Sleep difficulties  Patient will start lunesta as needed for sleep with the understanding that it is not a long term medication. Patient will practice sleep hygiene techniques discussed during the visit as well as review educational information sent home.      Tobacco Cessation:   reports that she has been smoking cigarettes. She has a 0.40 pack-year smoking history. She has never used smokeless tobacco.  Tobacco Cessation Action Plan: Information offered: Patient not interested at this time      Follow up with clinic 1-2 months or sooner if conditions change, worsen or fail to improve as expected.      Win Reyna PA-C  [facility]

## 2019-11-12 ENCOUNTER — OFFICE VISIT (OUTPATIENT)
Dept: FAMILY MEDICINE | Facility: OTHER | Age: 39
End: 2019-11-12
Payer: COMMERCIAL

## 2019-11-12 VITALS
RESPIRATION RATE: 12 BRPM | HEART RATE: 72 BPM | SYSTOLIC BLOOD PRESSURE: 112 MMHG | DIASTOLIC BLOOD PRESSURE: 62 MMHG | TEMPERATURE: 97.9 F | WEIGHT: 136.1 LBS | BODY MASS INDEX: 24.89 KG/M2

## 2019-11-12 DIAGNOSIS — G47.9 SLEEP DIFFICULTIES: ICD-10-CM

## 2019-11-12 DIAGNOSIS — F41.9 ANXIETY: ICD-10-CM

## 2019-11-12 DIAGNOSIS — F33.2 SEVERE EPISODE OF RECURRENT MAJOR DEPRESSIVE DISORDER, WITHOUT PSYCHOTIC FEATURES (H): Primary | ICD-10-CM

## 2019-11-12 PROCEDURE — 99213 OFFICE O/P EST LOW 20 MIN: CPT | Performed by: PHYSICIAN ASSISTANT

## 2019-11-12 RX ORDER — VENLAFAXINE HYDROCHLORIDE 225 MG/1
225 TABLET, EXTENDED RELEASE ORAL DAILY
Qty: 30 TABLET | Refills: 3 | Status: SHIPPED | OUTPATIENT
Start: 2019-11-12 | End: 2019-11-14

## 2019-11-12 RX ORDER — ZOLPIDEM TARTRATE 6.25 MG/1
6.25 TABLET, FILM COATED, EXTENDED RELEASE ORAL
Qty: 30 TABLET | Refills: 0 | Status: SHIPPED | OUTPATIENT
Start: 2019-11-12 | End: 2020-04-24

## 2019-11-12 RX ORDER — VENLAFAXINE HYDROCHLORIDE 150 MG/1
CAPSULE, EXTENDED RELEASE ORAL
Refills: 1 | Status: CANCELLED | OUTPATIENT
Start: 2019-11-12

## 2019-11-12 ASSESSMENT — PATIENT HEALTH QUESTIONNAIRE - PHQ9: SUM OF ALL RESPONSES TO PHQ QUESTIONS 1-9: 6

## 2019-11-12 ASSESSMENT — PAIN SCALES - GENERAL: PAINLEVEL: NO PAIN (0)

## 2019-11-12 NOTE — PROGRESS NOTES
Subjective     Nicole Phoenix is a 39 year old female who presents to clinic today for the following health issues:    HPI   Depression Followup    How are you doing with your depression since your last visit? Improved     Are you having other symptoms that might be associated with depression? No    Have you had a significant life event?  No     Are you feeling anxious or having panic attacks?   Yes:  anxiety    Do you have any concerns with your use of alcohol or other drugs? No    Social History     Tobacco Use     Smoking status: Current Some Day Smoker     Packs/day: 0.10     Years: 4.00     Pack years: 0.40     Types: Cigarettes     Smokeless tobacco: Never Used   Substance Use Topics     Alcohol use: No     Drug use: No     PHQ 3/29/2019 9/24/2019 11/12/2019   PHQ-9 Total Score 7 8 6   Q9: Thoughts of better off dead/self-harm past 2 weeks Not at all Not at all Not at all     RAYMOND-7 SCORE 3/8/2018   Total Score 9     Last PHQ-9 11/12/2019   1.  Little interest or pleasure in doing things 1   2.  Feeling down, depressed, or hopeless 1   3.  Trouble falling or staying asleep, or sleeping too much 3   4.  Feeling tired or having little energy 0   5.  Poor appetite or overeating 0   6.  Feeling bad about yourself 0   7.  Trouble concentrating 1   8.  Moving slowly or restless 0   Q9: Thoughts of better off dead/self-harm past 2 weeks 0   PHQ-9 Total Score 6   Difficulty at work, home, or with people Somewhat difficult         Suicide Assessment Five-step Evaluation and Treatment (SAFE-T)      How many servings of fruits and vegetables do you eat daily?  2-3    On average, how many sweetened beverages do you drink each day (soda, juice, sweet tea, etc)?   1    How many days per week do you miss taking your medication? 0      Patient is a 39 year old female who presents for follow up of anxiety, depression and sleeping difficulties. She was last seen 09/24/2019 at which time she felt that the effexor she had been  taking was not as effective following stopping remeron. Effexor was increased and a trial of lunesta was prescribed to help with sleep while working on sleep hygiene.     Today she states that her depression and anxiety are improving, but that she is very concerned about seasonal changes influencing her mood and would like to increase the dose to prevent worsening mood. Currently denies thoughts of wanting to hurt self or others. She has not seen her counselor in some time. I suggested that reconnecting before the winter fully starts would be of benefit. Lunesta was not helpful for sleep as she would wake during the night. We discussed ambien which patient said that she is fearful of trying due to stories about people having bad experiences such as nightmares, driving, etc. While sleeping.         Reviewed and updated as needed this visit by Provider         Review of Systems   ROS COMP: Constitutional, HEENT, cardiovascular, pulmonary, gi and gu systems are negative, except as otherwise noted.      Objective    /62 (BP Location: Left arm, Patient Position: Chair, Cuff Size: Adult Regular)   Pulse 72   Temp 97.9  F (36.6  C) (Oral)   Resp 12   Wt 61.7 kg (136 lb 1.6 oz)   BMI 24.89 kg/m    Body mass index is 24.89 kg/m .  Physical Exam   GENERAL: healthy, alert and no distress  RESP: lungs clear to auscultation - no rales, rhonchi or wheezes  CV: regular rate and rhythm, normal S1 S2, no S3 or S4, no murmur, click or rub, no peripheral edema and peripheral pulses strong  MS: no gross musculoskeletal defects noted, no edema  NEURO: Normal strength and tone, mentation intact and speech normal  PSYCH: mentation appears normal, affect normal/bright    Diagnostic Test Results:  Labs reviewed in Epic        Assessment & Plan       ICD-10-CM    1. Severe episode of recurrent major depressive disorder, without psychotic features (H) F33.2 venlafaxine (EFFEXOR-ER) 225 MG 24 hr tablet   2. Anxiety F41.9    3. Sleep  difficulties G47.9 zolpidem ER (AMBIEN CR) 6.25 MG CR tablet     Increase effexor to 225mg daily and monitor for adverse effects. Patient will trial controlled release ambien for 1 month. Stressed importance of practicing sleep hygiene. Patient may take up to 2000iu vitamin D daily to help with energy and mood during the winter. Plans to exercise in the AM. Follow up in the spring.       Return in about 4 months (around 3/12/2020).    Win Reyna PA-C  [facility]

## 2019-11-12 NOTE — NURSING NOTE
Health Maintenance Due   Topic Date Due     PNEUMOCOCCAL IMMUNIZATION 19-64 MEDIUM RISK (1 of 1 - PPSV23) 02/25/1999     DTAP/TDAP/TD IMMUNIZATION (2 - Td) 03/15/2019     PREVENTIVE CARE VISIT  04/05/2019     INFLUENZA VACCINE (1) 09/01/2019     Abeba YA LPN

## 2019-11-14 ENCOUNTER — TELEPHONE (OUTPATIENT)
Dept: FAMILY MEDICINE | Facility: OTHER | Age: 39
End: 2019-11-14

## 2019-11-14 DIAGNOSIS — F33.2 SEVERE EPISODE OF RECURRENT MAJOR DEPRESSIVE DISORDER, WITHOUT PSYCHOTIC FEATURES (H): Primary | ICD-10-CM

## 2019-11-14 RX ORDER — VENLAFAXINE HYDROCHLORIDE 75 MG/1
225 CAPSULE, EXTENDED RELEASE ORAL DAILY
Qty: 90 CAPSULE | Refills: 3 | Status: SHIPPED | OUTPATIENT
Start: 2019-11-14 | End: 2020-03-18

## 2019-11-14 NOTE — TELEPHONE ENCOUNTER
copay one venlafaxine ER tablets is $314.00.  Copay for the capsules is $0.00. Can you send us prescription for Effexor XR 75mg caps, three caps once daily?    Baltazar Lakhani [facility]  [phone]

## 2020-01-27 ENCOUNTER — OFFICE VISIT (OUTPATIENT)
Dept: FAMILY MEDICINE | Facility: OTHER | Age: 40
End: 2020-01-27
Payer: COMMERCIAL

## 2020-01-27 ENCOUNTER — TELEPHONE (OUTPATIENT)
Dept: FAMILY MEDICINE | Facility: OTHER | Age: 40
End: 2020-01-27

## 2020-01-27 VITALS
RESPIRATION RATE: 16 BRPM | HEIGHT: 61 IN | DIASTOLIC BLOOD PRESSURE: 68 MMHG | BODY MASS INDEX: 25.05 KG/M2 | SYSTOLIC BLOOD PRESSURE: 120 MMHG | OXYGEN SATURATION: 100 % | HEART RATE: 98 BPM | WEIGHT: 132.7 LBS | TEMPERATURE: 97.6 F

## 2020-01-27 DIAGNOSIS — R10.13 ABDOMINAL PAIN, EPIGASTRIC: ICD-10-CM

## 2020-01-27 DIAGNOSIS — R11.0 NAUSEA: ICD-10-CM

## 2020-01-27 DIAGNOSIS — R19.7 DIARRHEA, UNSPECIFIED TYPE: Primary | ICD-10-CM

## 2020-01-27 LAB
ANION GAP SERPL CALCULATED.3IONS-SCNC: 5 MMOL/L (ref 3–14)
BASOPHILS # BLD AUTO: 0.1 10E9/L (ref 0–0.2)
BASOPHILS NFR BLD AUTO: 0.5 %
BUN SERPL-MCNC: 13 MG/DL (ref 7–30)
CALCIUM SERPL-MCNC: 8.5 MG/DL (ref 8.5–10.1)
CHLORIDE SERPL-SCNC: 107 MMOL/L (ref 94–109)
CO2 SERPL-SCNC: 28 MMOL/L (ref 20–32)
CREAT SERPL-MCNC: 0.88 MG/DL (ref 0.52–1.04)
DIFFERENTIAL METHOD BLD: NORMAL
EOSINOPHIL # BLD AUTO: 0.3 10E9/L (ref 0–0.7)
EOSINOPHIL NFR BLD AUTO: 2.7 %
ERYTHROCYTE [DISTWIDTH] IN BLOOD BY AUTOMATED COUNT: 12 % (ref 10–15)
GFR SERPL CREATININE-BSD FRML MDRD: 82 ML/MIN/{1.73_M2}
GLUCOSE SERPL-MCNC: 95 MG/DL (ref 70–99)
HCT VFR BLD AUTO: 40.4 % (ref 35–47)
HGB BLD-MCNC: 13.4 G/DL (ref 11.7–15.7)
LYMPHOCYTES # BLD AUTO: 2.7 10E9/L (ref 0.8–5.3)
LYMPHOCYTES NFR BLD AUTO: 26.2 %
MCH RBC QN AUTO: 32.2 PG (ref 26.5–33)
MCHC RBC AUTO-ENTMCNC: 33.2 G/DL (ref 31.5–36.5)
MCV RBC AUTO: 97 FL (ref 78–100)
MONOCYTES # BLD AUTO: 0.6 10E9/L (ref 0–1.3)
MONOCYTES NFR BLD AUTO: 6.2 %
NEUTROPHILS # BLD AUTO: 6.7 10E9/L (ref 1.6–8.3)
NEUTROPHILS NFR BLD AUTO: 64.4 %
PLATELET # BLD AUTO: 280 10E9/L (ref 150–450)
POTASSIUM SERPL-SCNC: 4.2 MMOL/L (ref 3.4–5.3)
RBC # BLD AUTO: 4.16 10E12/L (ref 3.8–5.2)
SODIUM SERPL-SCNC: 140 MMOL/L (ref 133–144)
WBC # BLD AUTO: 10.4 10E9/L (ref 4–11)

## 2020-01-27 PROCEDURE — 80048 BASIC METABOLIC PNL TOTAL CA: CPT | Performed by: NURSE PRACTITIONER

## 2020-01-27 PROCEDURE — 36415 COLL VENOUS BLD VENIPUNCTURE: CPT | Performed by: NURSE PRACTITIONER

## 2020-01-27 PROCEDURE — 99213 OFFICE O/P EST LOW 20 MIN: CPT | Performed by: NURSE PRACTITIONER

## 2020-01-27 PROCEDURE — 85025 COMPLETE CBC W/AUTO DIFF WBC: CPT | Performed by: NURSE PRACTITIONER

## 2020-01-27 RX ORDER — SUCRALFATE ORAL 1 G/10ML
1 SUSPENSION ORAL 4 TIMES DAILY PRN
Qty: 420 ML | Refills: 0 | Status: SHIPPED | OUTPATIENT
Start: 2020-01-27 | End: 2020-04-24

## 2020-01-27 RX ORDER — ONDANSETRON 4 MG/1
4 TABLET, FILM COATED ORAL EVERY 8 HOURS PRN
Qty: 20 TABLET | Refills: 0 | Status: SHIPPED | OUTPATIENT
Start: 2020-01-27 | End: 2020-04-24

## 2020-01-27 ASSESSMENT — MIFFLIN-ST. JEOR: SCORE: 1214.3

## 2020-01-27 ASSESSMENT — PAIN SCALES - GENERAL: PAINLEVEL: SEVERE PAIN (6)

## 2020-01-27 NOTE — NURSING NOTE
Health Maintenance Due   Topic Date Due     PNEUMOCOCCAL IMMUNIZATION 19-64 MEDIUM RISK (1 of 1 - PPSV23) 02/25/1999     DTAP/TDAP/TD IMMUNIZATION (2 - Td) 03/15/2019     PREVENTIVE CARE VISIT  04/05/2019     INFLUENZA VACCINE (1) 09/01/2019      Health Maintenance reviewed at today's visit patient asked to schedule/complete:   Routine Health Visit:  Patient agrees to schedule  Immunizations:  Patient declined   Chasity Vidales LPN........1/27/2020 10:38 AM

## 2020-01-27 NOTE — PROGRESS NOTES
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      Objective    /68 (BP Location: Left arm, Patient Position: Sitting, Cuff Size: Adult Regular)   Pulse 98   Temp 97.6  F (36.4  C) (Temporal)   Resp 16   Ht 1.549 m (5' 1\")   Wt 60.2 kg (132 lb 11.2 oz)   SpO2 100%   Breastfeeding No   BMI 25.07 kg/m    Body mass index is 25.07 kg/m .  Physical Exam   GENERAL: alert and mild distress  HENT: ear canals and TM's normal, nose and mouth without ulcers or lesions  NECK: no adenopathy  RESP: lungs clear to auscultation - no rales, rhonchi or wheezes  CV: regular rate and rhythm, normal S1 S2, no S3 or S4, no murmur, click or rub  ABDOMEN: soft, bowel sounds hypoactive, generalized tenderness on exam, no guarding or rebound pain  MS: no gross musculoskeletal defects noted, no edema    Diagnostic Test Results:  Pending         Assessment & Plan     1. Diarrhea, unspecified type  Will check labs and stool samples.  Follow up base on results.   - CBC with " "platelets and differential  - Basic metabolic panel  (Ca, Cl, CO2, Creat, Gluc, K, Na, BUN)  - Enteric Bacteria and Virus Panel by [facility] Stool; Future  - Clostridium difficile Toxin B PCR; Future  - Giardia antigen; Future    2. Abdominal pain, epigastric  *Will check labs and stool samples.  Follow up base on results.   - CBC with platelets and differential  - Basic metabolic panel  (Ca, Cl, CO2, Creat, Gluc, K, Na, BUN)  - Enteric Bacteria and Virus Panel by [facility] Stool; Future  - Clostridium difficile Toxin B PCR; Future  - Giardia antigen; Future    3. Nausea  Will check labs and stool samples.  Follow up base on results.   - ondansetron (ZOFRAN) 4 MG tablet; Take 1 tablet (4 mg) by mouth every 8 hours as needed for nausea  Dispense: 20 tablet; Refill: 0  - sucralfate (CARAFATE) 1 GM/10ML suspension; Take 10 mLs (1 g) by mouth 4 times daily as needed for nausea  Dispense: 420 mL; Refill: 0     BMI:   Estimated body mass index is 25.07 kg/m  as calculated from the following:    Height as of this encounter: 1.549 m (5' 1\").    Weight as of this encounter: 60.2 kg (132 lb 11.2 oz).           See Patient Instructions    Return in about 1 week (around 2/3/2020) for Recheck only if not improving.    GINA Diehl [facility]    "

## 2020-01-27 NOTE — TELEPHONE ENCOUNTER
----- Message from GINA Diehl CNP sent at 1/27/2020  1:01 PM CST -----  Please call and let her know all her blood work was normal.     Electronically signed by Cynthia Mukherjee CNP.

## 2020-01-27 NOTE — TELEPHONE ENCOUNTER
Spoke with patient and informed of results below. Patient understood. No further questions or concerns at this time.     Mireya Espino MA

## 2020-01-27 NOTE — PATIENT INSTRUCTIONS
We will call with the labs results.     Send back the stool samples.     Use the Zofran or Carafate as needed for pain, nausea

## 2020-03-17 DIAGNOSIS — F33.2 SEVERE EPISODE OF RECURRENT MAJOR DEPRESSIVE DISORDER, WITHOUT PSYCHOTIC FEATURES (H): ICD-10-CM

## 2020-03-18 DIAGNOSIS — F33.2 SEVERE EPISODE OF RECURRENT MAJOR DEPRESSIVE DISORDER, WITHOUT PSYCHOTIC FEATURES (H): ICD-10-CM

## 2020-03-18 RX ORDER — VENLAFAXINE HYDROCHLORIDE 75 MG/1
CAPSULE, EXTENDED RELEASE ORAL
Qty: 90 CAPSULE | Refills: 3 | Status: SHIPPED | OUTPATIENT
Start: 2020-03-18 | End: 2020-03-18

## 2020-03-18 NOTE — TELEPHONE ENCOUNTER
Reason for Call:  Medication or medication refill:    Do you use a [facility]?  Name of the pharmacy and phone number for the current request:  [facility] - [phone]    Name of the medication requested: Venlafaxine     Other request:  Pt is due to a follow up regarding this medication, Pt has no more refills remaining.     Can we leave a detailed message on this number? YES    Phone number patient can be reached at: Cell number on file:    Telephone Information:   Mobile [phone]       Best Time: Whenever     Call taken on 3/18/2020 at 4:37 PM by Janis Salvador MA

## 2020-03-19 RX ORDER — VENLAFAXINE HYDROCHLORIDE 75 MG/1
225 CAPSULE, EXTENDED RELEASE ORAL DAILY
Qty: 90 CAPSULE | Refills: 3 | Status: SHIPPED | OUTPATIENT
Start: 2020-03-19 | End: 2020-07-22

## 2020-03-30 ENCOUNTER — NURSE TRIAGE (OUTPATIENT)
Dept: FAMILY MEDICINE | Facility: OTHER | Age: 40
End: 2020-03-30

## 2020-03-30 NOTE — TELEPHONE ENCOUNTER
Please call patient to see if she would be interested in doing a phone or clinic visit this week to discuss her increased symptoms.     Win Reyna PA-C on 3/30/2020 at 4:15 PM

## 2020-03-30 NOTE — TELEPHONE ENCOUNTER
LMTCB- when patient returns call please relay message from provider.     Chasity Vidales LPN........3/30/2020 4:21 PM

## 2020-03-30 NOTE — TELEPHONE ENCOUNTER
"Patient would like a phone visit with Win Reyna PA-C and a phone visit with Lauren Reed.  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    Left a message at cell number to let her Know Win BYNUM is only doing in person visits this week.     Please advise.     Vee Fitzpatrick RN on 3/30/2020 at 3:13 PM      Reason for Disposition    [1] Depression AND [2] worsening (e.g.,sleeping poorly, less able to do activities of daily living)    Additional Information    Negative: Patient attempted suicide    Negative: Patient is threatening suicide now    Negative: Violent behavior, or threatening to physically hurt or kill someone    Negative: [1] Patient is very confused (disoriented, slurred speech) AND [2] no other adult (e.g., friend or family member) available    Negative: [1] Difficult to awaken or acting very confused (disoriented, slurred speech) AND [2] new onset    Negative: Sounds like a life-threatening emergency to the triager    Negative: Alcohol use, abuse or dependence, question or problem related to    Negative: Drug abuse or dependence, question or problem related to    Negative: Bipolar disorder (manic depression)    Negative: Depression during the postpartum period (< 1 year since delivery)    Negative: [1] Depression AND [2] unable to do any of normal activities (e.g., self care, school, work; in comparison to baseline).    Negative: Very strange or confused behavior    Negative: Patient sounds very sick or weak to the triager    Answer Assessment - Initial Assessment Questions  1. 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 DEPRESSION SYMPTOM SCREENING: \"How are you feeling overall?\" (e.g., decreased energy, increased sleeping or difficulty sleeping, difficulty concentrating, feelings of " "sadness, guilt, hopelessness, or worthlessness)      Sleeping more than normal, harder to focus  3. RISK OF HARM - SUICIDAL IDEATION:  \"Do you ever have thoughts of hurting or killing yourself?\"  (e.g., yes, no, no but preoccupation with thoughts about death)    - INTENT:  \"Do you have thoughts of hurting or killing yourself right NOW?\" (e.g., yes, no, N/A)    - PLAN: \"Do you have a specific plan for how you would do this?\" (e.g., gun, knife, overdose, no plan, N/A)      No, but she feels like it could get to that point  4. RISK OF HARM - HOMICIDAL IDEATION:  \"Do you ever have thoughts of hurting or killing someone else?\"  (e.g., yes, no, no but preoccupation with thoughts about death)    - INTENT:  \"Do you have thoughts of hurting or killing someone right NOW?\" (e.g., yes, no, N/A)    - PLAN: \"Do you have a specific plan for how you would do this?\" (e.g., gun, knife, no plan, N/A)       no  5. FUNCTIONAL IMPAIRMENT: \"How have things been going for you overall in your life? Have you had any more difficulties than usual doing your normal daily activities?\"  (e.g., better, same, worse; self-care, school, work, interactions)      yes  6. SUPPORT: \"Who is with you now?\" \"Who do you live with?\" \"Do you have family or friends nearby who you can talk to?\"       , friends, co-workers  7. THERAPIST: \"Do you have a counselor or therapist? Name?\"      no  8. STRESSORS: \"Has there been any new stress or recent changes in your life?\"      Nurse- COVID 19  9. DRUG ABUSE/ALCOHOL: \"Do you drink alcohol or use any illegal drugs?\"       no  10. OTHER: \"Do you have any other health or medical symptoms right now?\" (e.g., fever)        Headache- migraine  11. PREGNANCY: \"Is there any chance you are pregnant?\" \"When was your last menstrual period?\"        no    Protocols used: DEPRESSION-A-AH      "

## 2020-03-31 NOTE — TELEPHONE ENCOUNTER
Patient calling back, gave message below and scheduled patient for a phone visit with her provider

## 2020-04-02 ENCOUNTER — VIRTUAL VISIT (OUTPATIENT)
Dept: FAMILY MEDICINE | Facility: CLINIC | Age: 40
End: 2020-04-02
Payer: COMMERCIAL

## 2020-04-02 ENCOUNTER — VIRTUAL VISIT (OUTPATIENT)
Dept: BEHAVIORAL HEALTH | Facility: CLINIC | Age: 40
End: 2020-04-02
Payer: COMMERCIAL

## 2020-04-02 DIAGNOSIS — M54.2 CERVICALGIA: ICD-10-CM

## 2020-04-02 DIAGNOSIS — F33.1 MODERATE RECURRENT MAJOR DEPRESSION (H): Primary | ICD-10-CM

## 2020-04-02 DIAGNOSIS — F41.9 ANXIETY: ICD-10-CM

## 2020-04-02 DIAGNOSIS — M62.838 NECK MUSCLE SPASM: ICD-10-CM

## 2020-04-02 DIAGNOSIS — F33.2 SEVERE EPISODE OF RECURRENT MAJOR DEPRESSIVE DISORDER, WITHOUT PSYCHOTIC FEATURES (H): Primary | ICD-10-CM

## 2020-04-02 DIAGNOSIS — G43.809 OTHER MIGRAINE WITHOUT STATUS MIGRAINOSUS, NOT INTRACTABLE: ICD-10-CM

## 2020-04-02 PROCEDURE — 99214 OFFICE O/P EST MOD 30 MIN: CPT | Mod: TEL | Performed by: PHYSICIAN ASSISTANT

## 2020-04-02 PROCEDURE — 90791 PSYCH DIAGNOSTIC EVALUATION: CPT | Mod: TEL | Performed by: MARRIAGE & FAMILY THERAPIST

## 2020-04-02 RX ORDER — TIZANIDINE 2 MG/1
2 TABLET ORAL 3 TIMES DAILY PRN
Qty: 60 TABLET | Refills: 0 | Status: SHIPPED | OUTPATIENT
Start: 2020-04-02 | End: 2020-05-01

## 2020-04-02 RX ORDER — SUMATRIPTAN 25 MG/1
25 TABLET, FILM COATED ORAL
Qty: 12 TABLET | Refills: 1 | Status: SHIPPED | OUTPATIENT
Start: 2020-04-02 | End: 2020-07-31

## 2020-04-02 RX ORDER — ESCITALOPRAM OXALATE 5 MG/1
TABLET ORAL
Qty: 30 TABLET | Refills: 0 | Status: SHIPPED | OUTPATIENT
Start: 2020-04-02 | End: 2020-04-24

## 2020-04-02 ASSESSMENT — ANXIETY QUESTIONNAIRES
IF YOU CHECKED OFF ANY PROBLEMS ON THIS QUESTIONNAIRE, HOW DIFFICULT HAVE THESE PROBLEMS MADE IT FOR YOU TO DO YOUR WORK, TAKE CARE OF THINGS AT HOME, OR GET ALONG WITH OTHER PEOPLE: VERY DIFFICULT
5. BEING SO RESTLESS THAT IT IS HARD TO SIT STILL: NOT AT ALL
6. BECOMING EASILY ANNOYED OR IRRITABLE: SEVERAL DAYS
1. FEELING NERVOUS, ANXIOUS, OR ON EDGE: NEARLY EVERY DAY
2. NOT BEING ABLE TO STOP OR CONTROL WORRYING: SEVERAL DAYS
3. WORRYING TOO MUCH ABOUT DIFFERENT THINGS: NEARLY EVERY DAY
7. FEELING AFRAID AS IF SOMETHING AWFUL MIGHT HAPPEN: NEARLY EVERY DAY
GAD7 TOTAL SCORE: 11

## 2020-04-02 ASSESSMENT — COLUMBIA-SUICIDE SEVERITY RATING SCALE - C-SSRS
1. IN THE PAST MONTH, HAVE YOU WISHED YOU WERE DEAD OR WISHED YOU COULD GO TO SLEEP AND NOT WAKE UP?: YES
TOTAL  NUMBER OF INTERRUPTED ATTEMPTS LIFETIME: NO
ATTEMPT LIFETIME: NO
REASONS FOR IDEATION PAST MONTH: MOSTLY TO END OR STOP THE PAIN (YOU COULDN'T GO ON LIVING WITH THE PAIN OR HOW YOU WERE FEELING)
2. HAVE YOU ACTUALLY HAD ANY THOUGHTS OF KILLING YOURSELF?: NO
TOTAL  NUMBER OF ABORTED OR SELF INTERRUPTED ATTEMPTS PAST 3 MONTHS: NO
5. HAVE YOU STARTED TO WORK OUT OR WORKED OUT THE DETAILS OF HOW TO KILL YOURSELF? DO YOU INTEND TO CARRY OUT THIS PLAN?: NO
REASONS FOR IDEATION LIFETIME: MOSTLY TO END OR STOP THE PAIN (YOU COULDN'T GO ON LIVING WITH THE PAIN OR HOW YOU WERE FEELING)
TOTAL  NUMBER OF INTERRUPTED ATTEMPTS PAST 3 MONTHS: NO
3. HAVE YOU BEEN THINKING ABOUT HOW YOU MIGHT KILL YOURSELF?: NO
5. HAVE YOU STARTED TO WORK OUT OR WORKED OUT THE DETAILS OF HOW TO KILL YOURSELF? DO YOU INTEND TO CARRY OUT THIS PLAN?: NO
TOTAL  NUMBER OF ABORTED OR SELF INTERRUPTED ATTEMPTS PAST LIFETIME: NO
2. HAVE YOU ACTUALLY HAD ANY THOUGHTS OF KILLING YOURSELF LIFETIME?: NO
4. HAVE YOU HAD THESE THOUGHTS AND HAD SOME INTENTION OF ACTING ON THEM?: NO
6. HAVE YOU EVER DONE ANYTHING, STARTED TO DO ANYTHING, OR PREPARED TO DO ANYTHING TO END YOUR LIFE?: NO
ATTEMPT PAST THREE MONTHS: NO
4. HAVE YOU HAD THESE THOUGHTS AND HAD SOME INTENTION OF ACTING ON THEM?: NO
1. IN THE PAST MONTH, HAVE YOU WISHED YOU WERE DEAD OR WISHED YOU COULD GO TO SLEEP AND NOT WAKE UP?: YES
6. HAVE YOU EVER DONE ANYTHING, STARTED TO DO ANYTHING, OR PREPARED TO DO ANYTHING TO END YOUR LIFE?: NO

## 2020-04-02 ASSESSMENT — PATIENT HEALTH QUESTIONNAIRE - PHQ9
5. POOR APPETITE OR OVEREATING: NOT AT ALL
SUM OF ALL RESPONSES TO PHQ QUESTIONS 1-9: 14

## 2020-04-02 NOTE — PROGRESS NOTES
"Subjective     Nicole Phoenix is a 40 year old female who is being evaluated via a billable telephone visit.      The patient has been notified of following:     \"This telephone visit will be conducted via a call between you and your physician/provider. We have found that certain health care needs can be provided without the need for a physical exam.  This service lets us provide the care you need with a short phone conversation.  If a prescription is necessary we can send it directly to your pharmacy.  If lab work is needed we can place an order for that and you can then stop by our lab to have the test done at a later time.    If during the course of the call the physician/provider feels a telephone visit is not appropriate, you will not be charged for this service.\"     Patient has given verbal consent for Telephone visit?  Yes    Nicole Phoenix complains of   Chief Complaint   Patient presents with     Depression     Headache       ALLERGIES  No known drug allergies and Poison ivy extract [extract of poison ivy]    Depression Followup    How are you doing with your depression since your last visit? Worsened     Are you having other symptoms that might be associated with depression? No    Have you had a significant life event?  No     Are you feeling anxious or having panic attacks?   Yes:  panic attack, anxiety    Do you have any concerns with your use of alcohol or other drugs? No    Social History     Tobacco Use     Smoking status: Current Some Day Smoker     Packs/day: 0.10     Years: 4.00     Pack years: 0.40     Types: Cigarettes     Smokeless tobacco: Never Used   Substance Use Topics     Alcohol use: No     Drug use: No     PHQ 9/24/2019 11/12/2019 4/2/2020   PHQ-9 Total Score 8 6 14   Q9: Thoughts of better off dead/self-harm past 2 weeks Not at all Not at all Several days     RAYMOND-7 SCORE 3/8/2018 4/2/2020   Total Score 9 11     Last PHQ-9 4/2/2020   1.  Little interest or pleasure in doing things 3   2.  " Feeling down, depressed, or hopeless 2   3.  Trouble falling or staying asleep, or sleeping too much 1   4.  Feeling tired or having little energy 3   5.  Poor appetite or overeating 0   6.  Feeling bad about yourself 1   7.  Trouble concentrating 3   8.  Moving slowly or restless 0   Q9: Thoughts of better off dead/self-harm past 2 weeks 1   PHQ-9 Total Score 14   Difficulty at work, home, or with people Extremely dIfficult       Suicide Assessment Five-step Evaluation and Treatment (SAFE-T)      How many servings of fruits and vegetables do you eat daily?  2-3    On average, how many sweetened beverages do you drink each day (Examples: soda, juice, sweet tea, etc.  Do NOT count diet or artificially sweetened beverages)?   2    How many days per week do you exercise enough to make your heart beat faster? 3 or less    How many minutes a day do you exercise enough to make your heart beat faster? 30 - 60    How many days per week do you miss taking your medication? 0    Headache  Onset: 12    Description:   Location: all over   Character: throbbing pain, sharp pain, squeezing pain  Frequency:  daily  Duration:  All day    Intensity: moderate, severe    Progression of Symptoms:  same    Accompanying Signs & Symptoms:  Stiff neck: YES  Neck or upper back pain: YES  Fever: no  Sinus pressure: YES  Nausea or vomiting: YES  Dizziness: no  Numbness: YES  Weakness: no  Visual changes: YES    History:   Head trauma: no  Family history of migraines: no  Previous tests for headaches: no  Neurologist evaluations: no  Able to do daily activities: no  Wake with a headaches: YES  Do headaches wake you up: no  Daily pain medication use: YES  Work/school stressors/changes: YES- work    Precipitating factors:   Does light make it worse: YES  Does sound make it worse: YES    Alleviating factors:  Does sleep help: YES    Therapies Tried and outcome: Ibuprofen (Advil, Motrin), Tylenol and Excedrin, Maxalt; no relief    Patient is a 40  year old female who calls in today to discuss worsening anxiety and depression as well as increased frequency of headaches. Patient was last seen in 11/2019 at which time her effexor 24hr was increased to 225mg. She reported at that visit that winter and spring are difficult to maintain stable mental health. She does meet with a counselor who is working with her on coping skills and positive thinking. Patient is also practicing good sleep hygiene and uses hypnotic medication sparingly, today she reported she has not needed it. 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 With these changes, her stress level at work has risen. She also notes that some of the tenants in her facility have been more disgruntled as of late. At home she is not finding much stress relief either, stating that having her daughters at home has presented unique challenges and an inability to find time to decompress. She did speak with her counselor this morning via virtual visit who recommended several different coping skills to practice to help alleviate the stress and anhedonia. Patient reports history of migraines which have been treated with triptans in the past. Has not needed them in some time as she has been able to manage headaches with lifestyle modifications and OTC medication. With her increase in stress she states that she experienced a migraine of sharp, stabbing pain over Monday/Tuesday which improved with sleep. She said that since she has felt a painful squeezing, throbbing in the back of her neck. She has noticed some off/on numbness/tingling over the left lateral neck and also in the distal fingers of the left hand. She has been working with the physical therapist at her facility to address ongoing cervicalgia. Denies worst headache of life, recent trauma or injury, dizziness/vertigo,  recent illness, use of chemicals, double vision or loss of vision, change in hearing, chest pain, trouble breathing, abdominal upset or fever.     Reviewed and updated as needed this visit by Provider         Review of Systems   ROS COMP: Constitutional, HEENT, cardiovascular, pulmonary, gi and gu systems are negative, except as otherwise noted.         Assessment/Plan:  1. Severe episode of recurrent major depressive disorder, without psychotic features (H)  Discussed with patient that as she is already on the maximum recommended dose of effexor I am hesitant to increase it further. Instead, I will have the patient start low dose (5mg) lexapro daily for the next week with optional increase to 10mg if tolerating. Reviewed symptoms of serotonin syndrome and advised patient to call if adverse effects, not tolerating or not improving as expected. Denies thoughts of SI/HI.   - escitalopram (LEXAPRO) 5 MG tablet; Take 1 tablet (5mg) daily for 1 week, then increase to 2 tablets (10mg)  Dispense: 30 tablet; Refill: 0    2. Anxiety  She will continue to work with counselor to strengthen coping skills and address new or persistent stressors.   - escitalopram (LEXAPRO) 5 MG tablet; Take 1 tablet (5mg) daily for 1 week, then increase to 2 tablets (10mg)  Dispense: 30 tablet; Refill: 0    3. Cervicalgia  Patient will continue to practice physical therapy exercises, utilize heat/ice and stretching as well as muscle relaxants as needed. She was cautioned about the sedative effects of this medication and advised not to operate machinery while taking. Also cautioned about the increased risk for serotonin toxicity and instructed to monitor for symptoms and seek care if they occur.   - tiZANidine (ZANAFLEX) 2 MG tablet; Take 1 tablet (2 mg) by mouth 3 times daily as needed (headache)  Dispense: 60 tablet; Refill: 0    4. Neck muscle spasm  See above.   - tiZANidine (ZANAFLEX) 2 MG tablet; Take 1 tablet (2 mg) by mouth 3 times daily  as needed (headache)  Dispense: 60 tablet; Refill: 0    5. Other migraine without status migrainosus, not intractable  Patient will have abortive medication to use if needed. Cautioned about possible adverse effects including increase in heart rate and serotonin. Patient will monitor for intolerance or adverse effects.   - SUMAtriptan (IMITREX) 25 MG tablet; Take 1 tablet (25 mg) by mouth at onset of headache for migraine May repeat in 2 hours. Max 8 tablets/24 hours.  Dispense: 12 tablet; Refill: 1    Return in about 1 month (around 5/2/2020) for Medication Recheck.      Phone call duration:  15 minutes    Win Reyna PA-C
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
## 2020-04-03 ASSESSMENT — ANXIETY QUESTIONNAIRES: GAD7 TOTAL SCORE: 11

## 2020-04-08 ENCOUNTER — VIRTUAL VISIT (OUTPATIENT)
Dept: BEHAVIORAL HEALTH | Facility: CLINIC | Age: 40
End: 2020-04-08
Payer: COMMERCIAL

## 2020-04-08 DIAGNOSIS — Z53.9 NO SHOW: Primary | ICD-10-CM

## 2020-04-08 NOTE — PROGRESS NOTES
1:05-[facility] made attempt to connect with patient, via video visit. There was no response.  1:10-[facility] made attempt to reach patient via phone and left message stating this writer will try again in a few minutes.  1:17-[facility] made final attempt to reach patient for scheduled visit. Left message for patient stating that she may call the clinic to reschedule her appointment at her convenience. Call back number was provided. [facility] also sent "[employer]t message.    MAX Joya, Behavioral Health Clinician    This patient was a no show for this scheduled appointment.

## 2020-04-20 DIAGNOSIS — F33.2 SEVERE EPISODE OF RECURRENT MAJOR DEPRESSIVE DISORDER, WITHOUT PSYCHOTIC FEATURES (H): ICD-10-CM

## 2020-04-20 DIAGNOSIS — F41.9 ANXIETY: ICD-10-CM

## 2020-04-21 NOTE — TELEPHONE ENCOUNTER
Spoke with patient - she is currently at the escitalopram 10mg dose and says that is working well for her.    She is planning to reschedule with Behavioral Health.      Julian Luke [facility], [facility] [phone]

## 2020-04-22 NOTE — TELEPHONE ENCOUNTER
Routing refill request to provider for review/approval because:  Labs out of range:  PHQ-9  Last Written Prescription Date:  4/2/2020  Last Fill Quantity: 30,  # refills: 0   Last office visit: 4/2/2020 with prescribing provider:     Future Office Visit:      Routing to team to set up virtual appointment for patient.  Please route to provider when appointment has been scheduled for completion of refill.    Pamela Phan, BSN, RN

## 2020-04-24 ENCOUNTER — VIRTUAL VISIT (OUTPATIENT)
Dept: FAMILY MEDICINE | Facility: CLINIC | Age: 40
End: 2020-04-24
Payer: COMMERCIAL

## 2020-04-24 DIAGNOSIS — M62.838 NECK MUSCLE SPASM: Primary | ICD-10-CM

## 2020-04-24 DIAGNOSIS — G43.809 OTHER MIGRAINE WITHOUT STATUS MIGRAINOSUS, NOT INTRACTABLE: ICD-10-CM

## 2020-04-24 DIAGNOSIS — F41.9 ANXIETY: ICD-10-CM

## 2020-04-24 DIAGNOSIS — F33.2 SEVERE EPISODE OF RECURRENT MAJOR DEPRESSIVE DISORDER, WITHOUT PSYCHOTIC FEATURES (H): ICD-10-CM

## 2020-04-24 PROCEDURE — 99214 OFFICE O/P EST MOD 30 MIN: CPT | Mod: TEL | Performed by: PHYSICIAN ASSISTANT

## 2020-04-24 RX ORDER — ESCITALOPRAM OXALATE 10 MG/1
10 TABLET ORAL DAILY
Qty: 30 TABLET | Refills: 1 | COMMUNITY
Start: 2020-04-24 | End: 2020-07-27 | Stop reason: ALTCHOICE

## 2020-04-24 RX ORDER — ESCITALOPRAM OXALATE 5 MG/1
TABLET ORAL
Qty: 30 TABLET | Refills: 0 | Status: SHIPPED | OUTPATIENT
Start: 2020-04-24 | End: 2020-04-24

## 2020-04-24 ASSESSMENT — ANXIETY QUESTIONNAIRES
3. WORRYING TOO MUCH ABOUT DIFFERENT THINGS: SEVERAL DAYS
6. BECOMING EASILY ANNOYED OR IRRITABLE: NOT AT ALL
2. NOT BEING ABLE TO STOP OR CONTROL WORRYING: SEVERAL DAYS
1. FEELING NERVOUS, ANXIOUS, OR ON EDGE: SEVERAL DAYS
IF YOU CHECKED OFF ANY PROBLEMS ON THIS QUESTIONNAIRE, HOW DIFFICULT HAVE THESE PROBLEMS MADE IT FOR YOU TO DO YOUR WORK, TAKE CARE OF THINGS AT HOME, OR GET ALONG WITH OTHER PEOPLE: SOMEWHAT DIFFICULT
GAD7 TOTAL SCORE: 4
5. BEING SO RESTLESS THAT IT IS HARD TO SIT STILL: NOT AT ALL
7. FEELING AFRAID AS IF SOMETHING AWFUL MIGHT HAPPEN: NOT AT ALL

## 2020-04-24 ASSESSMENT — PATIENT HEALTH QUESTIONNAIRE - PHQ9
SUM OF ALL RESPONSES TO PHQ QUESTIONS 1-9: 4
5. POOR APPETITE OR OVEREATING: SEVERAL DAYS

## 2020-04-24 NOTE — NURSING NOTE
Health Maintenance Due   Topic Date Due     PNEUMOCOCCAL IMMUNIZATION 19-64 MEDIUM RISK (1 of 1 - PPSV23) 02/25/1999     DTAP/TDAP/TD IMMUNIZATION (2 - Td) 03/15/2019     PREVENTIVE CARE VISIT  04/05/2019     Abeba YA LPN

## 2020-04-24 NOTE — PROGRESS NOTES
"Nicole Phoenix is a 40 year old female who is being evaluated via a billable telephone visit.      The patient has been notified of following:     \"This telephone visit will be conducted via a call between you and your physician/provider. We have found that certain health care needs can be provided without the need for a physical exam.  This service lets us provide the care you need with a short phone conversation.  If a prescription is necessary we can send it directly to your pharmacy.  If lab work is needed we can place an order for that and you can then stop by our lab to have the test done at a later time.    Telephone visits are billed at different rates depending on your insurance coverage. During this emergency period, for some insurers they may be billed the same as an in-person visit.  Please reach out to your insurance provider with any questions.    If during the course of the call the physician/provider feels a telephone visit is not appropriate, you will not be charged for this service.\"    Patient has given verbal consent for Telephone visit?  Yes    How would you like to obtain your AVS? MyChart    Subjective     Nicole Phoenix is a 40 year old female who presents to clinic today for the following health issues:    HPI  Depression Followup    How are you doing with your depression since your last visit? Improved     Are you having other symptoms that might be associated with depression? No    Have you had a significant life event?  OTHER: work stress     Are you feeling anxious or having panic attacks?   No    Do you have any concerns with your use of alcohol or other drugs? No    Social History     Tobacco Use     Smoking status: Current Some Day Smoker     Packs/day: 0.10     Years: 4.00     Pack years: 0.40     Types: Cigarettes     Smokeless tobacco: Never Used   Substance Use Topics     Alcohol use: No     Drug use: No     PHQ 9/24/2019 11/12/2019 4/2/2020   PHQ-9 Total Score 8 6 14   Q9: Thoughts " of better off dead/self-harm past 2 weeks Not at all Not at all Several days     RAYMOND-7 SCORE 3/8/2018 4/2/2020   Total Score 9 11     Last PHQ-9 4/24/2020   1.  Little interest or pleasure in doing things 0   2.  Feeling down, depressed, or hopeless 0   3.  Trouble falling or staying asleep, or sleeping too much 1   4.  Feeling tired or having little energy 1   5.  Poor appetite or overeating 0   6.  Feeling bad about yourself 0   7.  Trouble concentrating 1   8.  Moving slowly or restless 1   Q9: Thoughts of better off dead/self-harm past 2 weeks 0   PHQ-9 Total Score 4   Difficulty at work, home, or with people Somewhat difficult     RAYMOND-7  4/2/2020   1. Feeling nervous, anxious, or on edge 3   2. Not being able to stop or control worrying 1   3. Worrying too much about different things 3   4. Trouble relaxing 0   5. Being so restless that it is hard to sit still 0   6. Becoming easily annoyed or irritable 1   7. Feeling afraid, as if something awful might happen 3   RAYMOND-7 Total Score 11   If you checked any problems, how difficult have they made it for you to do your work, take care of things at home, or get along with other people? Very difficult     Suicide Assessment Five-step Evaluation and Treatment (SAFE-T)      How many servings of fruits and vegetables do you eat daily?  2-3    On average, how many sweetened beverages do you drink each day (Examples: soda, juice, sweet tea, etc.  Do NOT count diet or artificially sweetened beverages)?   1    How many days per week do you exercise enough to make your heart beat faster? 3 or less    How many minutes a day do you exercise enough to make your heart beat faster? 30 - 60    How many days per week do you miss taking your medication? 0    Patient is a 40 year old female who calls in for follow up of depression and anxiety. She completed a virtual visit earlier this month and was started on escitalopram in addition to her effexor. Along with this medication  patient was given a muscle relaxant to help with suspected cervicalgia and imitrex to use as needed for migraines. She was educated extensively on the risk for serotonin toxicity and cautioned to seek medical care if symptoms occur. Today she reports that her depression and anxiety have significantly improved. This is also reflected in the PHQ and RAYMOND screens. She is tolerating the medication without issue and would like to remain on current dose. Unfortunately, the headaches have persisted despite the use of the muscle relaxant and continued physical therapy. There does not seem to be any previous workup for cervical spine issues. She does note that her neck pain is worse while at work and having to wear PPE.     Reviewed and updated as needed this visit by Provider         Review of Systems   ROS COMP: Constitutional, HEENT, cardiovascular, pulmonary, gi and gu systems are negative, except as otherwise noted.         Assessment/Plan:  1. Severe episode of recurrent major depressive disorder, without psychotic features (H)  Patient is doing better on the medication, symptoms are now well controlled. No changes recommended at this time. Refill sent to the pharmacy.   - escitalopram (LEXAPRO) 10 MG tablet; Take 1 tablet (10 mg) by mouth daily  Dispense: 30 tablet; Refill: 1    2. Anxiety  See above. Denies thoughts of wanting to hurt self or others.   - escitalopram (LEXAPRO) 10 MG tablet; Take 1 tablet (10 mg) by mouth daily  Dispense: 30 tablet; Refill: 1    3. Neck muscle spasm  This has not improved as expected. Patient was offered clinic visit next week to further evaluate. She will consider and call clinic if she wishes to do this.     4. Other migraine without status migrainosus, not intractable  Imitrex has been successful in helping headaches that are not improving with conservative cares. Consider prophylactic medication if they continue to worsen.       Return in about 4 months (around 8/24/2020) for  Medication Recheck.      Phone call duration:  8 minutes    Win Reyna PA-C

## 2020-04-24 NOTE — TELEPHONE ENCOUNTER
2 attempts at contacting patient with no luck. [employer] message sent to patient requesting she schedule an office visit.    Sending to provider for refill request review.     Denae Wang CMA ([facility]) 4/24/2020

## 2020-04-25 ASSESSMENT — ANXIETY QUESTIONNAIRES: GAD7 TOTAL SCORE: 4

## 2020-05-01 ENCOUNTER — OFFICE VISIT (OUTPATIENT)
Dept: FAMILY MEDICINE | Facility: CLINIC | Age: 40
End: 2020-05-01
Payer: COMMERCIAL

## 2020-05-01 ENCOUNTER — HOSPITAL ENCOUNTER (OUTPATIENT)
Dept: GENERAL RADIOLOGY | Facility: CLINIC | Age: 40
End: 2020-05-01
Attending: PHYSICIAN ASSISTANT
Payer: COMMERCIAL

## 2020-05-01 VITALS
WEIGHT: 132 LBS | HEART RATE: 104 BPM | OXYGEN SATURATION: 98 % | BODY MASS INDEX: 24.94 KG/M2 | SYSTOLIC BLOOD PRESSURE: 112 MMHG | DIASTOLIC BLOOD PRESSURE: 74 MMHG | TEMPERATURE: 97.9 F | RESPIRATION RATE: 18 BRPM

## 2020-05-01 DIAGNOSIS — M54.2 NECK PAIN: ICD-10-CM

## 2020-05-01 DIAGNOSIS — M62.838 NECK MUSCLE SPASM: ICD-10-CM

## 2020-05-01 DIAGNOSIS — M54.2 CERVICALGIA: ICD-10-CM

## 2020-05-01 DIAGNOSIS — M54.2 NECK PAIN: Primary | ICD-10-CM

## 2020-05-01 PROCEDURE — 72040 X-RAY EXAM NECK SPINE 2-3 VW: CPT | Mod: TC

## 2020-05-01 PROCEDURE — 99214 OFFICE O/P EST MOD 30 MIN: CPT | Performed by: PHYSICIAN ASSISTANT

## 2020-05-01 RX ORDER — PREDNISONE 20 MG/1
TABLET ORAL
Qty: 20 TABLET | Refills: 0 | Status: SHIPPED | OUTPATIENT
Start: 2020-05-01 | End: 2020-05-15

## 2020-05-01 RX ORDER — TIZANIDINE 2 MG/1
2 TABLET ORAL 3 TIMES DAILY PRN
Qty: 60 TABLET | Refills: 0 | Status: SHIPPED | OUTPATIENT
Start: 2020-05-01 | End: 2020-07-27

## 2020-05-01 ASSESSMENT — PAIN SCALES - GENERAL: PAINLEVEL: MODERATE PAIN (5)

## 2020-05-01 NOTE — PROGRESS NOTES
Subjective     Nicole Phoenix is a 40 year old female who presents to clinic today for the following health issues:    HPI   Neck Pain  Onset: 2 months    Description:   Location: back of neck  Radiation: into the right neck    Intensity: moderate    Progression of Symptoms:  worsening    Accompanying Signs & Symptoms:  Burning, prickly sensation (paresthesias) in arm(s): no   Numbness in arm(s): YES  Weakness in arm(s):  no   Fever: no   Headache: YES  Nausea and/or vomiting: no     History:   Trauma: no   Previous neck pain: YES  Previous surgery or injections: YES  Previous Imaging (MRI,X ray): YES    Precipitating factors:   Does movement increase the pain:  YES    Alleviating factors:      Therapies Tried and outcome:      Patient is a 40 year old female with reported history of fibromyalgia, cervicalgia and migraines who presents today for evaluation of worsening soreness in her neck and shoulders. Patient has been treated with muscle relaxant and conservative cares over the past month without improvement. She reports having undergone imaging in the past, but I do not see any record of this. No recent injury or change in activity that may have caused her symptoms. Current symptoms include include neck soreness and pain radiating into the upper extremities.     Reviewed and updated as needed this visit by Provider         Review of Systems   ROS COMP: Constitutional, HEENT, cardiovascular, pulmonary, gi and gu systems are negative, except as otherwise noted.      Objective    /74   Pulse 104   Temp 97.9  F (36.6  C) (Temporal)   Resp 18   Wt 59.9 kg (132 lb)   SpO2 98%   BMI 24.94 kg/m    Body mass index is 24.94 kg/m .  Physical Exam   GENERAL: healthy, alert and no distress  NECK: no adenopathy, no asymmetry, masses, or scars and thyroid normal to palpation  RESP: lungs clear to auscultation - no rales, rhonchi or wheezes  CV: regular rate and rhythm, normal S1 S2, no S3 or S4, no murmur, click or  rub, no peripheral edema and peripheral pulses strong  MS: no gross musculoskeletal defects noted, no edema, normal AROM of neck and shoulders, tenderness to palpation along cervical spine and trapezius muscles bilaterally  NEURO: Normal strength and tone, mentation intact and speech normal  PSYCH: mentation appears normal, affect normal/bright    Diagnostic Test Results:  Xray -   CERVICAL SPINE TWO TO THREE VIEWS  5/1/2020 1:31 PM      COMPARISON: None.     HISTORY: Neck pain ongoing recently worsened over past 2-3 months  causing radicular pain into the upper extremities.                                                                      IMPRESSION: There is normal alignment of the cervical vertebrae;  however, there is straightening of normal cervical lordosis. Vertebral  body heights of the cervical spine are normal. Craniocervical  alignment is normal. There is no evidence for fracture of the cervical  spine. There are bilateral cervical ribs arising from C7. The left C7  cervical rib is small and does not have any pseudoarthroses. The right  C7 cervical rib forms a pseudoarthrosis with the right first rib.     CRYSTAL BARRAGAN MD        Assessment & Plan     1. Neck pain  Discussed with patient the finding of an abnormal straightening of the cervical spine and concern for it contributing to her symptoms. In order to evaluate this further I would like her to consult with spine specialty. Patient understands that they will likely want to obtain further imaging.   - XR Cervical Spine 2/3 Views; Future  - predniSONE (DELTASONE) 20 MG tablet; Take 3 tabs by mouth daily x 3 days, then 2 tabs daily x 3 days, then 1 tab daily x 3 days, then 1/2 tab daily x 3 days.  Dispense: 20 tablet; Refill: 0  - SPINE CARE REFERRAL    2. Cervicalgia  Patient will complete prednisone taper to help with symptoms. Reviewed possible adverse effects of medication and advised patient to take with food. Educational information declined.    - predniSONE (DELTASONE) 20 MG tablet; Take 3 tabs by mouth daily x 3 days, then 2 tabs daily x 3 days, then 1 tab daily x 3 days, then 1/2 tab daily x 3 days.  Dispense: 20 tablet; Refill: 0  - tiZANidine (ZANAFLEX) 2 MG tablet; Take 1 tablet (2 mg) by mouth 3 times daily as needed (headache)  Dispense: 60 tablet; Refill: 0  - SPINE CARE REFERRAL    3. Neck muscle spasm  Reviewed the benefits of application of heat to help reduce muscle spasm. I will refill her muscle relaxer. Reminded patient of sedative effects of the medication and not to drive or use machinery while taking.   - predniSONE (DELTASONE) 20 MG tablet; Take 3 tabs by mouth daily x 3 days, then 2 tabs daily x 3 days, then 1 tab daily x 3 days, then 1/2 tab daily x 3 days.  Dispense: 20 tablet; Refill: 0  - tiZANidine (ZANAFLEX) 2 MG tablet; Take 1 tablet (2 mg) by mouth 3 times daily as needed (headache)  Dispense: 60 tablet; Refill: 0  - SPINE CARE REFERRAL    Return in about 4 months (around 9/1/2020) for Return for scheduled annual checkup with PCP.    Win Reyna PA-C  [facility]

## 2020-05-01 NOTE — NURSING NOTE
Chief Complaint   Patient presents with     Neck Pain     2 months now and getting worse     Headache     from the neck pain

## 2020-05-07 ENCOUNTER — VIRTUAL VISIT (OUTPATIENT)
Dept: NEUROSURGERY | Facility: CLINIC | Age: 40
End: 2020-05-07
Attending: PHYSICIAN ASSISTANT
Payer: COMMERCIAL

## 2020-05-07 DIAGNOSIS — M54.12 CERVICAL RADICULOPATHY: Primary | ICD-10-CM

## 2020-05-07 PROCEDURE — 99214 OFFICE O/P EST MOD 30 MIN: CPT | Mod: TEL | Performed by: PHYSICIAN ASSISTANT

## 2020-05-07 NOTE — PROGRESS NOTES
"Nicole Phoenix is a 40 year old female who is being evaluated via a billable telephone visit.      The patient has been notified of following:     \"This telephone visit will be conducted via a call between you and your physician/provider. We have found that certain health care needs can be provided without the need for a physical exam.  This service lets us provide the care you need with a short phone conversation.  If a prescription is necessary we can send it directly to your pharmacy.  If lab work is needed we can place an order for that and you can then stop by our lab to have the test done at a later time.    Telephone visits are billed at different rates depending on your insurance coverage. During this emergency period, for some insurers they may be billed the same as an in-person visit.  Please reach out to your insurance provider with any questions.    If during the course of the call the physician/provider feels a telephone visit is not appropriate, you will not be charged for this service.\"    Patient has given verbal consent for Telephone visit?  Yes    What phone number would you like to be contacted at? [phone]     How would you like to obtain your AVS? Marcelo Anderson  [facility] Phone Visit      CC: Neck pain    Primary care Provider: Win Reyna      Reason For Visit:   I was asked by  Win Reyna PA-C to consult on the patient for   M54.2 (ICD-10-CM) - Neck pain    M54.2 (ICD-10-CM) - Cervicalgia    M62.838 (ICD-10-CM) - Neck muscle spasm       HPI: Nicole Phoenix is a 40 year old female who presents for evaluation of chronic neck pain that has progressively worsened over the past few months. Pain is located in bilateral neck and radiates to bilateral shoulders/scapula and down bilateral arms R>L into hands. Describes the pain as a sharp pain on the left side of the neck and dull and achy muscle spams. She is having numbness in BUE R>L. She feels as " though she is having difficult grasping things with right hand. Pain is worsened with sitting at computer for any length of time. Pain is alleviated with massage and she has been doing PT through work . Was taking prednisone which was helpful, but made her irritable so she stopped taking it. No recent MRI or injections; has had ESIs in the past with good relief and interested in another.    Current pain: 6/10 At worst: 8/10    Past Medical History:   Diagnosis Date     Cervical high risk HPV (human papillomavirus) test positive 16    Neg      Cervicalgia 10/18/2016     Neck muscle spasm 10/18/2016     NO ACTIVE PROBLEMS      Syncope, unspecified syncope type 10/18/2016       Past Medical History reviewed with patient during visit.    Past Surgical History:   Procedure Laterality Date     C  DELIVERY+POSTPARTUM CARE       HC REMOVAL OF TONSILS,<11 Y/O       HC REPAIR EXTEN TENDON FINGER W/O GRAFT, EACH  03/15/09    right ring finger     Past Surgical History reviewed with patient during visit.    Current Outpatient Medications   Medication     escitalopram (LEXAPRO) 10 MG tablet     predniSONE (DELTASONE) 20 MG tablet     SUMAtriptan (IMITREX) 25 MG tablet     tiZANidine (ZANAFLEX) 2 MG tablet     venlafaxine (EFFEXOR-XR) 75 MG 24 hr capsule     No current facility-administered medications for this visit.        Allergies   Allergen Reactions     No Known Drug Allergies      Poison Ivy Extract [Extract Of Poison Martha]        Social History     Socioeconomic History     Marital status:      Spouse name: None     Number of children: None     Years of education: None     Highest education level: None   Occupational History     None   Social Needs     Financial resource strain: None     Food insecurity     Worry: None     Inability: None     Transportation needs     Medical: None     Non-medical: None   Tobacco Use     Smoking status: Current Some Day Smoker     Packs/day: 0.10     Years: 4.00      Pack years: 0.40     Types: Cigarettes     Smokeless tobacco: Never Used   Substance and Sexual Activity     Alcohol use: No     Drug use: No     Sexual activity: Yes     Partners: Male     Birth control/protection: Surgical, Pill   Lifestyle     Physical activity     Days per week: None     Minutes per session: None     Stress: None   Relationships     Social connections     Talks on phone: None     Gets together: None     Attends [employer] service: None     Active member of club or organization: None     Attends meetings of clubs or organizations: None     Relationship status: None     Intimate partner violence     Fear of current or ex partner: None     Emotionally abused: None     Physically abused: None     Forced sexual activity: None   Other Topics Concern      Service Yes     Comment: [employer] [id number]  Never deployed outside of the [place]     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 Not Asked   Social History Narrative     and lives at home with her  and daughter.  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               Family History   Problem Relation Age of Onset     Breast Cancer Maternal Grandmother      Alcohol/Drug Maternal Grandfather         recovering alcoholic     Diabetes Paternal Grandmother      Cerebrovascular Disease Paternal Grandmother           ROS: 10 point ROS neg other than the symptoms noted above in the HPI.    Vital Signs: There were no vitals taken for this  visit.    Examination:  Constitutional:  Alert  HEENT: Normocephalic, atraumatic.       Neurological:  Awake  Alert  Oriented x 3  Speech clear    Imaging:   CERVICAL SPINE TWO TO THREE VIEWS  5/1/2020 1:31 PM      COMPARISON: None.     HISTORY: Neck pain ongoing recently worsened over past 2-3 months  causing radicular pain into the upper extremities.                                                                      IMPRESSION: There is normal alignment of the cervical vertebrae;  however, there is straightening of normal cervical lordosis. Vertebral  body heights of the cervical spine are normal. Craniocervical  alignment is normal. There is no evidence for fracture of the cervical  spine. There are bilateral cervical ribs arising from C7. The left C7  cervical rib is small and does not have any pseudoarthroses. The right  C7 cervical rib forms a pseudoarthrosis with the right first rib.     CRYSTAL BARRAGAN MD    Assessment/Plan:   Nicole Phoenix is a 40 year old female who presents for evaluation of chronic neck pain that has progressively worsened over the past few months. Pain is located in bilateral neck and radiates to bilateral shoulders/scapula and down bilateral arms R>L into hands. Describes the pain as a sharp pain on the left side of the neck and dull and achy muscle spams. She is having numbness in BUE R>L. She feels as though she is having difficult grasping things with right hand. Cervical XR reviewed. Will obtain MRI and call her with results and likely plan for GERTRUDIS as she has had good relief in the past. Patient voiced understanding and agreement.      Phone call duration: 6 minutes          Sallie HUERTA [facility]  [address] [address]    Tel [phone]  Pager [phone]

## 2020-05-15 ENCOUNTER — MYC MEDICAL ADVICE (OUTPATIENT)
Dept: FAMILY MEDICINE | Facility: CLINIC | Age: 40
End: 2020-05-15

## 2020-05-15 ENCOUNTER — TELEPHONE (OUTPATIENT)
Dept: NEUROSURGERY | Facility: CLINIC | Age: 40
End: 2020-05-15

## 2020-05-15 ENCOUNTER — HOSPITAL ENCOUNTER (OUTPATIENT)
Dept: MRI IMAGING | Facility: CLINIC | Age: 40
Discharge: HOME | End: 2020-05-15
Attending: PHYSICIAN ASSISTANT | Admitting: PHYSICIAN ASSISTANT
Payer: COMMERCIAL

## 2020-05-15 DIAGNOSIS — M54.12 CERVICAL RADICULOPATHY: ICD-10-CM

## 2020-05-15 DIAGNOSIS — G95.0 SYRINX OF SPINAL CORD (H): ICD-10-CM

## 2020-05-15 DIAGNOSIS — R22.1 NECK MASS: Primary | ICD-10-CM

## 2020-05-15 DIAGNOSIS — M54.12 CERVICAL RADICULOPATHY: Primary | ICD-10-CM

## 2020-05-15 PROCEDURE — 72141 MRI NECK SPINE W/O DYE: CPT

## 2020-05-15 NOTE — TELEPHONE ENCOUNTER
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     It also looks like she has a possible lesion in her left neck, can we get her to follow up with PCP for further workup of this?    Called and spoke to patient.

## 2020-05-19 ENCOUNTER — MYC MEDICAL ADVICE (OUTPATIENT)
Dept: FAMILY MEDICINE | Facility: CLINIC | Age: 40
End: 2020-05-19

## 2020-05-19 DIAGNOSIS — G95.0 SYRINX OF SPINAL CORD (H): Primary | ICD-10-CM

## 2020-05-20 ENCOUNTER — HOSPITAL ENCOUNTER (OUTPATIENT)
Dept: MRI IMAGING | Facility: CLINIC | Age: 40
Discharge: HOME | End: 2020-05-20
Attending: PHYSICIAN ASSISTANT | Admitting: PHYSICIAN ASSISTANT
Payer: COMMERCIAL

## 2020-05-20 DIAGNOSIS — G95.0 SYRINX OF SPINAL CORD (H): ICD-10-CM

## 2020-05-20 PROCEDURE — 25500064 ZZH RX 255 OP 636: Performed by: PHYSICIAN ASSISTANT

## 2020-05-20 PROCEDURE — 72142 MRI NECK SPINE W/DYE: CPT

## 2020-05-20 PROCEDURE — A9585 GADOBUTROL INJECTION: HCPCS | Performed by: PHYSICIAN ASSISTANT

## 2020-05-20 RX ORDER — TRAMADOL HYDROCHLORIDE 50 MG/1
50-100 TABLET ORAL EVERY 6 HOURS PRN
Qty: 12 TABLET | Refills: 0 | Status: SHIPPED | OUTPATIENT
Start: 2020-05-20 | End: 2020-05-26

## 2020-05-20 RX ORDER — GADOBUTROL 604.72 MG/ML
7.5 INJECTION INTRAVENOUS ONCE
Status: COMPLETED | OUTPATIENT
Start: 2020-05-20 | End: 2020-05-20

## 2020-05-20 RX ADMIN — GADOBUTROL 7.5 ML: 604.72 INJECTION INTRAVENOUS at 15:07

## 2020-05-22 ENCOUNTER — TELEPHONE (OUTPATIENT)
Dept: NEUROSURGERY | Facility: CLINIC | Age: 40
End: 2020-05-22

## 2020-05-22 NOTE — TELEPHONE ENCOUNTER
Patient calling to request results from MR cervical w/ contrast ordered by Sallie Gage PA-C. Informed her that I will route message to care team to review and give results.

## 2020-05-26 ENCOUNTER — MYC MEDICAL ADVICE (OUTPATIENT)
Dept: FAMILY MEDICINE | Facility: CLINIC | Age: 40
End: 2020-05-26

## 2020-05-26 ENCOUNTER — TELEPHONE (OUTPATIENT)
Dept: NEUROSURGERY | Facility: CLINIC | Age: 40
End: 2020-05-26

## 2020-05-26 DIAGNOSIS — G95.0 SYRINX OF SPINAL CORD (H): ICD-10-CM

## 2020-05-26 DIAGNOSIS — G95.0 SYRINX OF SPINAL CORD (H): Primary | ICD-10-CM

## 2020-05-26 DIAGNOSIS — M54.12 CERVICAL RADICULOPATHY: Primary | ICD-10-CM

## 2020-05-26 RX ORDER — TRAMADOL HYDROCHLORIDE 50 MG/1
50 TABLET ORAL EVERY 8 HOURS PRN
Qty: 8 TABLET | Refills: 0 | Status: SHIPPED | OUTPATIENT
Start: 2020-05-26 | End: 2020-08-13

## 2020-05-26 NOTE — TELEPHONE ENCOUNTER
Called and informed patient per Sallie LIM contrasted scan did not show any underlying lesions or irregularities associated with the short syrinx. Syrinx could be from prior trauma, infection, or idiopathic. Since she has gotten good relief with ESIs in the past, can refer for repeat CIPRIANO.     Patient would like to proceed with CIPRIANO, order placed with [facility] and faxed to [place] location [phone]

## 2020-05-28 ENCOUNTER — TRANSFERRED RECORDS (OUTPATIENT)
Dept: HEALTH INFORMATION MANAGEMENT | Facility: CLINIC | Age: 40
End: 2020-05-28

## 2020-05-28 ENCOUNTER — TELEPHONE (OUTPATIENT)
Dept: PALLIATIVE MEDICINE | Facility: CLINIC | Age: 40
End: 2020-05-28

## 2020-05-28 NOTE — TELEPHONE ENCOUNTER
LVM to schedule New Eval; Syrinx of cervical spine.    Lissa SANTILLAN    [facility]

## 2020-06-16 NOTE — TELEPHONE ENCOUNTER

## 2020-07-22 ENCOUNTER — MYC REFILL (OUTPATIENT)
Dept: FAMILY MEDICINE | Facility: OTHER | Age: 40
End: 2020-07-22

## 2020-07-22 DIAGNOSIS — F33.2 SEVERE EPISODE OF RECURRENT MAJOR DEPRESSIVE DISORDER, WITHOUT PSYCHOTIC FEATURES (H): ICD-10-CM

## 2020-07-23 NOTE — TELEPHONE ENCOUNTER
Routing refill request to provider for review/approval because:  Drug interaction warning    SHAQUILLE DiazN, RN  [facility]

## 2020-07-24 RX ORDER — VENLAFAXINE HYDROCHLORIDE 75 MG/1
225 CAPSULE, EXTENDED RELEASE ORAL DAILY
Qty: 270 CAPSULE | Refills: 0 | Status: SHIPPED | OUTPATIENT
Start: 2020-07-24 | End: 2020-10-27

## 2020-07-27 ENCOUNTER — VIRTUAL VISIT (OUTPATIENT)
Dept: PALLIATIVE MEDICINE | Facility: CLINIC | Age: 40
End: 2020-07-27
Payer: COMMERCIAL

## 2020-07-27 DIAGNOSIS — M54.12 CERVICAL RADICULOPATHY: ICD-10-CM

## 2020-07-27 DIAGNOSIS — M79.18 MYOFASCIAL MUSCLE PAIN: Primary | ICD-10-CM

## 2020-07-27 PROCEDURE — 99204 OFFICE O/P NEW MOD 45 MIN: CPT | Mod: GT | Performed by: PAIN MEDICINE

## 2020-07-27 RX ORDER — TRAMADOL HYDROCHLORIDE 50 MG/1
25-50 TABLET ORAL DAILY PRN
Qty: 10 TABLET | Refills: 0 | Status: SHIPPED | OUTPATIENT
Start: 2020-07-27 | End: 2020-08-26

## 2020-07-27 RX ORDER — ORPHENADRINE CITRATE 100 MG/1
100 TABLET, EXTENDED RELEASE ORAL 2 TIMES DAILY
Qty: 60 TABLET | Refills: 1 | Status: SHIPPED | OUTPATIENT
Start: 2020-07-27 | End: 2020-08-04 | Stop reason: SINTOL

## 2020-07-27 ASSESSMENT — PAIN SCALES - GENERAL: PAINLEVEL: SEVERE PAIN (7)

## 2020-07-27 NOTE — PROGRESS NOTES
"Nicole Phoenix is a 40 year old female who is being evaluated via a billable video visit.      The patient has been notified of following:     \"This video visit will be conducted via a call between you and your physician/provider. We have found that certain health care needs can be provided without the need for an in-person physical exam.  This service lets us provide the care you need with a video conversation.  If a prescription is necessary we can send it directly to your pharmacy.  If lab work is needed we can place an order for that and you can then stop by our lab to have the test done at a later time.    Video visits are billed at different rates depending on your insurance coverage.  Please reach out to your insurance provider with any questions.    If during the course of the call the physician/provider feels a video visit is not appropriate, you will not be charged for this service.\"    Patient has given verbal consent for Video visit? Yes  How would you like to obtain your AVS? MyChart  If you are dropped from the video visit, the video invite should be resent to: Text to cell phone: [phone]  Will anyone else be joining your video visit? No        Video-Visit Details    Type of service:  Video Visit    Video Start Time: 3  Video End Time: 340    Originating Location (pt. Location): Home    Distant Location (provider location):  [facility] [place]     Platform used for Video Visit: Rebecca Arias MD      [facility] Consultation    Date of visit: 7/27/2020    Reason for consultation:    Primary Care Provider is Win Reyna  Pain medications are being prescribed by n/a.    Please see the [facility] health questionnaire which the patient completed and reviewed with me in detail.    Chief Complaint:    Chief Complaint   Patient presents with     Pain     Video visit due to COVID-19      MME prescribed prior to seeing patient:  Current " MME:    Pain history: Of note patient no showed her first appointment      Nicole Phoenix is a 40 year old female who first started having problems with pain acute on chronic  Left>right-sided neck pain radiating to her upper extremity. The pain started 10yrs ago. No inciting event. The pain has gotten worse over the last 6 months no inciting event. The pt works as a care giver.   The pain radiates all the way into her hand right >Left.  The pain is variable in nature.  The pain varies from sharp shooting pain to a deep dull aching pain.  The pain is also spasming in nature.    Pain is worse with extension.  The pain is worse with rotation.  The pain is worse with lifting.  The pain is worse when bringing her arm above her head.  The pain is worse with working.    The patient notes benefit with massage in the past.  Patient notes some benefit with PT in the past.  Patient has had benefit with epidural steroid injections in the past. The pt recently had an epidural that helped for approx 2 wks.       Of note the patient feels she has had difficulty with her grasp.  Right greater than left.    Gabapentin  Did not tolerate, the pt felt extremely tired. Could not focus at work    The patient is currently being seen by neurosurgery.    The patient has symptoms significantly affect her quality of life.  Pain rating: intensity  Averages 8/10 on a 0-10 scale.      Current treatments include:  Effexor  Imitrex    Previous medication treatments included:  Tramadol  zanaflex- but was only on 2 mg, flexerl,   Gabapentin - sSE  mobic  topamax   Other treatments have included:  Nicole Phoenix has been seen at a pain clinic in the past.  CDI  PT: yes  Chiropractic: n  Acupuncture: n  TENs Unit: n  Injections:   GERTRUDIS benefit in the past last in June 2 wks benefit.     Past Medical History:  Past Medical History:   Diagnosis Date     Cervical high risk HPV (human papillomavirus) test positive 11/2/16    Neg 16/18     Cervicalgia  10/18/2016     Neck muscle spasm 10/18/2016     NO ACTIVE PROBLEMS      Syncope, unspecified syncope type 10/18/2016     Past Surgical History:  Past Surgical History:   Procedure Laterality Date     C  DELIVERY+POSTPARTUM CARE       HC REMOVAL OF TONSILS,<13 Y/O       HC REPAIR EXTEN TENDON FINGER W/O GRAFT, EACH  03/15/09    right ring finger     Medications:  Current Outpatient Medications   Medication Sig Dispense Refill     orphenadrine ER (NORFLEX) 100 MG 12 hr tablet Take 1 tablet (100 mg) by mouth 2 times daily 60 tablet 1     SUMAtriptan (IMITREX) 25 MG tablet Take 1 tablet (25 mg) by mouth at onset of headache for migraine May repeat in 2 hours. Max 8 tablets/24 hours. 12 tablet 1     traMADol (ULTRAM) 50 MG tablet Take 0.5-1 tablets (25-50 mg) by mouth daily as needed for severe pain To last 1 month 10 tablet 0     venlafaxine (EFFEXOR-XR) 75 MG 24 hr capsule Take 3 capsules (225 mg) by mouth daily 270 capsule 0     traMADol (ULTRAM) 50 MG tablet Take 1 tablet (50 mg) by mouth every 8 hours as needed for severe pain 8 tablet 0     Allergies:     Allergies   Allergen Reactions     No Known Drug Allergies      Poison Ivy Extract [Extract Of Poison Ivy]      Social History:  Home situation: fam  Occupation/Schooling: y      Family history:  Family History   Problem Relation Age of Onset     Breast Cancer Maternal Grandmother      Alcohol/Drug Maternal Grandfather         recovering alcoholic     Diabetes Paternal Grandmother      Cerebrovascular Disease Paternal Grandmother      Family history of headaches: n    Review of Systems:  Skin: negative  Eyes: negative  Ears/Nose/Throat: negative  Respiratory: No shortness of breath, dyspnea on exertion, cough, or hemoptysis  Cardiovascular: negative  Gastrointestinal: negative  Genitourinary: negative  Musculoskeletal: negative  Neurologic: negative  Psychiatric: negative  Hematologic/Lymphatic/Immunologic: negative  Endocrine: negative    Physical  Exam:  There were no vitals filed for this visit.  Exam:  Constitutional: healthy, alert and no distress  Respiratory: Speaking in full sentences no accessory muscles use   Psychiatric: mentation appears normal and affect normal/bright    Musculoskeletal exam:  Gait/Station/Posture: wnl  Cervical spine: ROMwnl  Mildly positive Spurling on the right when instructed how to perform.     Motor: Able to easily over, gravity      Diagnostic tests:                                                                     IMPRESSION:  1. No abnormal contrast enhancement in the cervical spinal cord  associated with the presumed syrinx described on the noncontrast  images acquired 5/15/2020.  2. Rim-enhancing multiloculated/lobulated collection in the sublingual  and submandibular spaces on the left may represent a ranula. Soft  tissue neck CT would be helpful for further characterization.           Assessment/Plan:  Nicole Phoenix is a 40 year old female who presents with the complaints of acute on chronic bilateral neck pain left greater than right radiating to her upper extremity right greater than left.   Nicole was seen today for pain.    Diagnoses and all orders for this visit:    Myofascial muscle pain  -     orphenadrine ER (NORFLEX) 100 MG 12 hr tablet; Take 1 tablet (100 mg) by mouth 2 times daily    Cervical radiculopathy  -     traMADol (ULTRAM) 50 MG tablet; Take 0.5-1 tablets (25-50 mg) by mouth daily as needed for severe pain To last 1 month         - Further procedures recommended:    -Consider repeat cervical epidural steroid injection  - Medication Management:    -Short course of tramadol provided   -Norflex 100 mg twice daily  - Physical Therapy: We will strongly consider pain PT  - Clinical Health Psychologist to address issues of relaxation, behavioral change, coping style, and other factors important to improvement: Strongly consider  - Diagnostic Studies: no  - Urine toxicology screen today: None at this time  any further prescriptions will need to be performed  - Follow up:    2 to 3 months          Total time spent was 40 minutes, John Arias MD  [facility] [place]  This note was created with voice recognition software, and while reviewed for accuracy, typos may remain.

## 2020-07-31 ENCOUNTER — MYC REFILL (OUTPATIENT)
Dept: FAMILY MEDICINE | Facility: CLINIC | Age: 40
End: 2020-07-31

## 2020-07-31 DIAGNOSIS — G43.809 OTHER MIGRAINE WITHOUT STATUS MIGRAINOSUS, NOT INTRACTABLE: ICD-10-CM

## 2020-07-31 RX ORDER — SUMATRIPTAN 25 MG/1
25 TABLET, FILM COATED ORAL
Qty: 12 TABLET | Refills: 1 | Status: SHIPPED | OUTPATIENT
Start: 2020-07-31 | End: 2020-10-08 | Stop reason: ALTCHOICE

## 2020-07-31 NOTE — TELEPHONE ENCOUNTER
Prescription approved per [facility] Refill Protocol.    Vee Fitzpatrick RN on 7/31/2020 at 3:43 PM

## 2020-08-03 ENCOUNTER — MYC MEDICAL ADVICE (OUTPATIENT)
Dept: PALLIATIVE MEDICINE | Facility: CLINIC | Age: 40
End: 2020-08-03

## 2020-08-03 DIAGNOSIS — M54.12 CERVICAL RADICULOPATHY: Primary | ICD-10-CM

## 2020-08-04 NOTE — TELEPHONE ENCOUNTER
Per patient Jeyhart message:  From: Nicole Lynntitusjosué      Created: 8/4/2020 10:36 AM       I am currently seeing my physical therapist at my place of work and yes I would like to schedule another injection. I am on day four with a headache from my neck spasms. I go back to work tomorrow for my long stretch of days,  and I really need some relief.       _________________________________  Dr. Arias okay to order repeat cervical epidural steroid injection?  Also pt wants a new muscle relaxer to try.  The norflex failed.  Tried and failed tizanidine, robaxin and flexeril already.    Olinda He, RN-BSN  [facility]

## 2020-08-04 NOTE — TELEPHONE ENCOUNTER
Mary sent to pt:  From: Olinda He RN      Created: 8/4/2020 11:56 AM      Dr. Arias wants to know how many days you took the norflex.     He placed the order for the cervical epidural steroid injection. You can call to schedule: [phone].     Olinda He RN-BSN  [facility]

## 2020-08-04 NOTE — TELEPHONE ENCOUNTER
Pt having side effects from norflex-Nausea, light headed, and sweating horribly.    Mychart sent to pt:  From: Olinda He RN      Created: 8/4/2020 9:15 AM      Hi Nicole.  Looks like you have tried the following muscle relaxants:  Tizanidine-2mg  Flexeril  Methocarbamol     Did you tolerate any of the above medications?  If not give details as to why.     What pharmacy do you want to use?        Olinda He RN-BSN  [facility]

## 2020-08-04 NOTE — TELEPHONE ENCOUNTER
Per patient myChart message:  From: Nicole Phoenix      Created: 8/4/2020 12:18 PM      I took it for 7 days, I cannot function at work on it        Routed to provider.    Olinda He RN-BSN  [facility]

## 2020-08-04 NOTE — TELEPHONE ENCOUNTER
From: Nicole Phoenix      Created: 8/4/2020 10:00 AM      I did not tolerate the flexeril, completely wipes me out, Tizanadine gave me horrible headaches and the methocarbamol was ineffective.      ____________________  Mychart sent to pt:  From: Olinda He RN      Created: 8/4/2020 10:14 AM      Mark Rivera,  I reviewed Dr. Arias's notes from your last visit with him.  He strongly encourages you try our clinic physical therapy.  This can be done virtually. Let us know if you would like us to place this order.  He also mentioned trying a repeat cervical epidural steroid injection.  Would you want to do this?        Olinda He RN-BSN  [facility]

## 2020-08-04 NOTE — TELEPHONE ENCOUNTER
Order placed for cervical epidural.  Please clarify exactly how long patient was taking the Norflex for.  If she was taking for at least a week would consider baclofen.

## 2020-08-05 ENCOUNTER — TELEPHONE (OUTPATIENT)
Dept: PALLIATIVE MEDICINE | Facility: CLINIC | Age: 40
End: 2020-08-05

## 2020-08-05 NOTE — TELEPHONE ENCOUNTER
Given patient did not tolerate gabapentin in the past.  We could consider low-dose Lyrica and titrate to effect.  Additionally, we could wait until after the procedure before considering any medications.

## 2020-08-12 NOTE — TELEPHONE ENCOUNTER
The injection is not scheduled.    Dr. Arias do you want her to try baclofen as stated in previous message or lyrica?    Olinda He, RN-BSN  [facility]

## 2020-08-13 RX ORDER — PREGABALIN 25 MG/1
CAPSULE ORAL
Status: CANCELLED | OUTPATIENT
Start: 2020-08-13

## 2020-08-13 NOTE — TELEPHONE ENCOUNTER
Per patient myChart message:  From: Olinda He RN      Created: 8/13/2020 2:30 PM      Hi Nicole,  Here is Dr. Arias's plan:  Given patient did not tolerate gabapentin in the past.  We could consider low-dose Lyrica and titrate to effect.  Additionally, we could wait until after the procedure before considering any medications.     Olinda He RN-BSN  [facility]

## 2020-08-14 RX ORDER — PREGABALIN 25 MG/1
25 CAPSULE ORAL 2 TIMES DAILY
Qty: 60 CAPSULE | Refills: 0 | Status: SHIPPED | OUTPATIENT
Start: 2020-08-14 | End: 2020-09-17

## 2020-08-14 NOTE — TELEPHONE ENCOUNTER
From: Nicole Phoenix      Created: 8/13/2020 2:42 PM        Ok that sounds great! I will call tomorrow and schedule another set of injections. Thank you so much!!

## 2020-08-26 DIAGNOSIS — M54.12 CERVICAL RADICULOPATHY: ICD-10-CM

## 2020-08-26 RX ORDER — TRAMADOL HYDROCHLORIDE 50 MG/1
25-50 TABLET ORAL DAILY PRN
Qty: 10 TABLET | Refills: 0 | Status: SHIPPED | OUTPATIENT
Start: 2020-08-26 | End: 2020-09-29

## 2020-08-26 NOTE — TELEPHONE ENCOUNTER
Received call from patient requesting refill(s) of traMADol (ULTRAM) 50 MG tablet    Last dispensed from pharmacy on 07/31/20    Pt last seen by prescribing provider on 07/27/20  Next appt scheduled for 10/26/20     checked in the past 6 months? Yes If no, print current report and give to RN    Last urine drug screen date none  Current opioid agreement on file (completed within the last year) No Date of opioid agreement: none    E-prescribe to pharmacy-[facility] [place], [place] - [address]     Will route to nursing [place] for review and preparation of prescription(s).

## 2020-08-26 NOTE — TELEPHONE ENCOUNTER
Medication refill information reviewed.     Due date for traMADol (ULTRAM) 50 MG tablet is 8/30/20     Prescriptions prepped for review.     Will route to provider.     Glenn Hook, RN  Care Coordinator   [facility] [place]

## 2020-08-28 ENCOUNTER — MYC REFILL (OUTPATIENT)
Dept: PALLIATIVE MEDICINE | Facility: CLINIC | Age: 40
End: 2020-08-28

## 2020-08-28 DIAGNOSIS — M54.12 CERVICAL RADICULOPATHY: ICD-10-CM

## 2020-08-28 RX ORDER — TRAMADOL HYDROCHLORIDE 50 MG/1
25-50 TABLET ORAL DAILY PRN
Qty: 10 TABLET | Refills: 0 | Status: CANCELLED | OUTPATIENT
Start: 2020-08-28

## 2020-08-28 NOTE — TELEPHONE ENCOUNTER
Rx for Tramadol 50 mg was signed and sent out to [facility].    Called pt and left VM stating, refill available for  at [facility].    Pieter Glasgow MA

## 2020-09-16 ENCOUNTER — MYC MEDICAL ADVICE (OUTPATIENT)
Dept: PALLIATIVE MEDICINE | Facility: CLINIC | Age: 40
End: 2020-09-16

## 2020-09-16 DIAGNOSIS — M54.12 CERVICAL RADICULOPATHY: Primary | ICD-10-CM

## 2020-09-17 ENCOUNTER — MYC REFILL (OUTPATIENT)
Dept: PALLIATIVE MEDICINE | Facility: CLINIC | Age: 40
End: 2020-09-17

## 2020-09-17 DIAGNOSIS — M54.12 CERVICAL RADICULOPATHY: ICD-10-CM

## 2020-09-17 NOTE — TELEPHONE ENCOUNTER
"[employer] message from patient on 9/17 at 0906:      Refills have been requested for the following medications:         pregabalin (LYRICA) 25 MG capsule [John Arias MD]     Preferred pharmacy: [facility] [address]    -------------    Will route to MA pool for assistance with gathering refill information.     Message sent to pt:      Yeyo Rivera,     Do you have a few days left of your prescription?  Dr. Arias is out of the office today but will be back tomorrow to review this request. I just wanted to be sure that you had enough medication.     Take care,     SHAQUILLE HeadN, RN-BC  Patient Care Supervisor  [facility] [place]

## 2020-09-17 NOTE — TELEPHONE ENCOUNTER
[employer] message from patient on 9/17 at 1056:    Yes i am ok, just as long as it gets filled before the wkend!! Thankyou for checking   ----------  Routing to MA team to gather information re: amy shah.     Marii Arroyo, BSN, RN-BC  Patient Care Supervisor  [facility] [place]

## 2020-09-18 RX ORDER — PREGABALIN 25 MG/1
25 CAPSULE ORAL 2 TIMES DAILY
Qty: 60 CAPSULE | Refills: 1 | Status: SHIPPED | OUTPATIENT
Start: 2020-09-18 | End: 2020-12-15

## 2020-09-18 NOTE — TELEPHONE ENCOUNTER
Received fax request from patient requesting refill(s) for Lyrica    Last refilled on 8/14    Pt last seen on 7/27  Next appt scheduled for 10/26    Will facilitate refill.    SHAQUILLE HeadN, RN-BC  Patient Care Supervisor  [facility] [place]

## 2020-09-29 ENCOUNTER — MYC REFILL (OUTPATIENT)
Dept: PALLIATIVE MEDICINE | Facility: CLINIC | Age: 40
End: 2020-09-29

## 2020-09-29 DIAGNOSIS — M54.12 CERVICAL RADICULOPATHY: ICD-10-CM

## 2020-09-29 RX ORDER — TRAMADOL HYDROCHLORIDE 50 MG/1
25-50 TABLET ORAL DAILY PRN
Qty: 10 TABLET | Refills: 0 | Status: CANCELLED | OUTPATIENT
Start: 2020-09-29

## 2020-09-29 RX ORDER — TRAMADOL HYDROCHLORIDE 50 MG/1
25-50 TABLET ORAL DAILY PRN
Qty: 10 TABLET | Refills: 0 | Status: SHIPPED | OUTPATIENT
Start: 2020-09-29 | End: 2020-10-28

## 2020-09-29 NOTE — TELEPHONE ENCOUNTER
Medication refill information reviewed.     Due date for traMADol (ULTRAM) 50 MG tablet is 9/29/20     Prescriptions prepped for review.     Will route to provider.     Glenn Hook, RN  Care Coordinator   [facility] [place]

## 2020-09-29 NOTE — TELEPHONE ENCOUNTER
Received call from patient requesting refill(s) of traMADol (ULTRAM) 50 MG tablet    Last dispensed from pharmacy on 08/29/20    Pt last seen by prescribing provider on 07/27/20  Next appt scheduled for 10/26/20     checked in the past 6 months? Yes If no, print current report and give to RN    Last urine drug screen date None  Current opioid agreement on file (completed within the last year) No Date of opioid agreement: None    E-prescribe to pharmacy-[facility] [place], [place] - [address]     Will route to nursing [place] for review and preparation of prescription(s).

## 2020-10-08 ENCOUNTER — MYC MEDICAL ADVICE (OUTPATIENT)
Dept: PALLIATIVE MEDICINE | Facility: CLINIC | Age: 40
End: 2020-10-08

## 2020-10-08 DIAGNOSIS — M79.18 MYOFASCIAL MUSCLE PAIN: ICD-10-CM

## 2020-10-08 DIAGNOSIS — G43.809 OTHER MIGRAINE WITHOUT STATUS MIGRAINOSUS, NOT INTRACTABLE: ICD-10-CM

## 2020-10-08 DIAGNOSIS — G43.109 MIGRAINE WITH AURA AND WITHOUT STATUS MIGRAINOSUS, NOT INTRACTABLE: Primary | ICD-10-CM

## 2020-10-08 RX ORDER — RIZATRIPTAN BENZOATE 10 MG/1
10 TABLET ORAL
Qty: 10 TABLET | Refills: 1 | Status: SHIPPED | OUTPATIENT
Start: 2020-10-08 | End: 2020-12-15

## 2020-10-08 RX ORDER — BUTALBITAL, ACETAMINOPHEN AND CAFFEINE 50; 325; 40 MG/1; MG/1; MG/1
1 TABLET ORAL DAILY PRN
Qty: 10 TABLET | Refills: 0 | Status: SHIPPED | OUTPATIENT
Start: 2020-10-08 | End: 2020-10-22

## 2020-10-08 NOTE — TELEPHONE ENCOUNTER
We could consider using a different triptan, specifically Maxalt.  Additionally, could consider Fioricet.  Patient does have active order for cervical epidural she just needs to schedule.

## 2020-10-08 NOTE — TELEPHONE ENCOUNTER
Pt has tried Maxalt in the past and is agreeable to try it again as she hasn't had any side effects from it.  Pt is agreeable to try Fioricet also.    Will forward to Dr. Arias to review and sign appropriate orders.    Glenn Hook, RN  Care Coordinator   [facility] [place]

## 2020-10-08 NOTE — TELEPHONE ENCOUNTER
From: Nicole Phoenix      Created: 10/8/2020 12:55 PM        *-*-*This message has not been handled.*-*-*    I have been suffering from a 3 day migrain and my current prescription for imitrex is ineffective. Is there something else I can be prescribed. Also can you please send me the number to set up my cervical injections, I need them asap.  Nicole Torres        Will forward to Dr. Arias to review and advise on Pt's Migraine.    Writer sent Pt office number to call to schedule.    Glenn Hook, RN  Care Coordinator   [facility]

## 2020-10-09 NOTE — TELEPHONE ENCOUNTER
Mary message from patient on 10/8 at 1549:    After reading up on Fioracet I do think I would benefit from this medication and would like to try it due to having a lot of muscle spasms in my neck and back of the skull. I am also just waiting for my  to send my upcoming schedule so I can schedule my injections.   Nicole Torres   -------------  Message sent to pt:    Mark Rivera,     You probably already know this but Dr. Arias sent over prescriptions for Maxalt and Fiorcet yesterday afternoon. I hope they are helpful in minimizing that pain you are experiencing for the migraine.     Take care,     Marii Arroyo, SHAQUILLEN, RN-BC  Patient Care Supervisor  [facility] [place]

## 2020-10-14 ENCOUNTER — TELEPHONE (OUTPATIENT)
Dept: PALLIATIVE MEDICINE | Facility: CLINIC | Age: 40
End: 2020-10-14

## 2020-10-14 ENCOUNTER — MYC MEDICAL ADVICE (OUTPATIENT)
Dept: PALLIATIVE MEDICINE | Facility: CLINIC | Age: 40
End: 2020-10-14

## 2020-10-14 NOTE — TELEPHONE ENCOUNTER
Per patient Jeyhart message:  From: Nicole Lynntitusjosué      Created: 10/14/2020 11:48 AM      I have an appointment set up for Oct. 20th for cervical injections, i am concerned about working the couple days following the procedure and I am wondering if I could have a DrGalina Note to be off work the 21st and 22nd. I have found in the past I have the best results if  I give my body a couple days to recover. The pain is getting to the point where just day to day life is difficult so I really hope and pray this will help.     Thank-you      ________________________    Pt is scheduled for a repeat cervical facet joint injection on 10/20/20.,      Dr. Arias, will you provide a work note excusing her on 10/21 and 10/22 after the injection?    Olinda RN-BSN  [facility]

## 2020-10-14 NOTE — TELEPHONE ENCOUNTER
"Screening Questions for Radiology Injections:    Injection to be done at which interventional clinic site? [facility] [place]    If [facility], tell patient that this procedure requires a COVID-19 lab test be done within 4 days of the procedure. Would you still like to move forward with scheduling the procedure?  Not Applicable   If YES, let patient know that someone will call them to schedule the COVID-19 test. Route to nursing to enter order.     Instruct patient to arrive as directed prior to the scheduled appointment time:    [place] minutes before      [place]: 30 minutes before; if IV needed 1 hour before     Dr. Stafford-no IV needed for Cervical CIPRIANO; please instruct to arrive 30\" early    Procedure ordered by Hugo    Procedure ordered? Repeat Cervical Epidural Steroid Injection    As a reminder, receiving steroids can decrease your body's ability to fight infection.   Would you still like to move forward with scheduling the injection?  Yes      Transforaminal Cervical CIPRIANO - no pain provider currently performing    What insurance would patient like us to bill for this procedure? [employer]       Worker's comp or MVA (motor vehicle accident) -Any injection DO NOT SCHEDULE and route to Staci Whittington.      [employer] insurance - For SI joint injections, DO NOT SCHEDULE and route Staci Whittington.       [employer] - Any injection besides hip/shoulder/knee joint DO NOT SCHEDULE and route to Staci Whittington. She will obtain PA and call pt back to schedule procedure or notify pt of denial.       [employer]-Route to Staci for review      **BCBS- ALL need to be routed to Staci for review if a PA is needed**      IF SCHEDULING IN [place] AND NEEDS A PA, IT IS OKAY TO SCHEDULE. [place] HANDLES THEIR OWN PA'S AFTER THE PATIENT IS SCHEDULED. PLEASE SCHEDULE AT LEAST 1 WEEK OUT SO A PA CAN BE OBTAINED.    Any chance of pregnancy? NO   If YES, do NOT schedule and route to RN pool    Is an "  needed? No     Patient has a drive home? (mandatory) YES: informed     Is patient taking any blood thinners (i.e. plavix, coumadin, jantoven, warfarin, heparin, pradaxa or dabigatran, etc)? No   If hold needed, do NOT schedule, route to RN pool     Is patient taking any aspirin products (includes Excedrin and Fiorinal)? No     If more than 325mg/day, OK to schedule; Instruct pt to decrease to less than 325 mg for 7 days AND route to RN pool    For CERVICAL procedures, hold all aspirin products for 6 days.     Tell pt that if aspirin product is not held for 6 days, the procedure WILL BE cancelled.      Does the patient have a bleeding or clotting disorder? No     If YES, okay to schedule AND route to RN nurse pool    For any patients with platelet count <100, must be forwarded to provider    Is patient diabetic?  No  If YES, instruct them to bring their glucometer.    Does patient have an active infection or treated for one within the past week? No     Is patient currently taking any antibiotics?  No     For patients on chronic, preventative, or prophylactic antibiotics, procedures may be scheduled.     For patients on antibiotics for active or recent infection:antibiotic course must have been completed for 4 days    Is patient currently taking any steroid medications? (i.e. Prednisone, Medrol)  No     For patients on steroid medications, course must have been completed for 4 days    Is patient actively being treated for cancer or immunocompromised? No  If YES, do NOT schedule and route to RN pool     Are you able to get on and off an exam table with minimal or no assistance? Yes  If NO, do NOT schedule and route to RN pool    Are you able to roll over and lay on your stomach with minimal or no assistance? Yes  If NO, do NOT schedule and route to RN pool     Any allergies to contrast dye, iodine, shellfish, or numbing and steroid medications? No  If YES, route to RN pool AND add allergy information to  appointment notes    Allergies: No known drug allergies and Poison ivy extract [extract of poison ivy]      Has the patient had a flu shot or any other vaccinations within 7 days before or after the procedure.  No     Does patient have an MRI/CT?  YES: 2020  Check Procedure Scheduling Grid to see if required.      Was the MRI done within the last 3 years?  Yes    If yes, where was the MRI done i.e.[facility], [facility], [facility], [facility] etc? [facility]       If no, do not schedule and route to RN pool    If MRI was not done at [facility], [facility] or [facility] do NOT schedule and route to RN pool.      If pt has an imaging disc, the injection MAY be scheduled but pt has to bring disc to appt.     If they show up without the disc the injection cannot be done    Procedure Specific Instructions:      If celiac plexus block, informed patient NPO for 6 hours and that it is okay to take medications with sips of water, especially blood pressure medications  Not Applicable         If this is for a cervical procedure, informed patient that aspirin needs to be held for 6 days.   Not Applicable      If IV needed:    Do not schedule procedures requiring IV placement in the first appointment of the day or first appointment after lunch. Do NOT schedule at 0745, 0815 or 1245.     Instructed pt to arrive 30 minutes early for IV start if required. (Check Procedure Scheduling Grid)  Not Applicable    Reminders:      If you are started on any steroids or antibiotics between now and your appointment, you must contact us because the procedure may need to be cancelled.  No      For all procedures except radiofrequency ablations (RFAs) and spinal cord stimulator (SCS) trials, informed patient:    IV sedation is not provided for this procedure.  If you feel that an oral anti-anxiety medication is needed, you can discuss this further with your referring provider or primary care provider.  The Pain Clinic provider will discuss  specifics of what the procedure includes at your appointment.  Most procedures last 10-20 minutes.  We use numbing medications to help with any discomfort during the procedure.  Not Applicable      For patients 85 or older we recommend having an adult stay w/ them for the remainder of the day.       Does the patient have any questions?  NO  Stacy Cortes  [facility]

## 2020-10-15 ENCOUNTER — MYC MEDICAL ADVICE (OUTPATIENT)
Dept: PALLIATIVE MEDICINE | Facility: CLINIC | Age: 40
End: 2020-10-15

## 2020-10-15 NOTE — TELEPHONE ENCOUNTER
Per patient myChart message:  From: Nicole Phoenix      Created: 10/15/2020 12:04 PM      I am also wondering about getting something in writing from Dr. Arias about working a maximum of 8hrs until my neck pain and headaches are manageable. 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 Please get back to me as soon as possible on this concern.  Thank you, Nicole      __________________  Mychart sent to pt:  From: Olinda He RN      Created: 10/15/2020 12:48 PM      Hi Nicole,  Is this under workers comp?  And if so have you ever had a functional capacity exam?     Olinda RN-BSN  [facility]

## 2020-10-20 ENCOUNTER — ANCILLARY PROCEDURE (OUTPATIENT)
Dept: RADIOLOGY | Facility: CLINIC | Age: 40
End: 2020-10-20
Attending: PAIN MEDICINE
Payer: COMMERCIAL

## 2020-10-20 ENCOUNTER — RADIOLOGY INJECTION OFFICE VISIT (OUTPATIENT)
Dept: PALLIATIVE MEDICINE | Facility: CLINIC | Age: 40
End: 2020-10-20
Attending: PAIN MEDICINE
Payer: COMMERCIAL

## 2020-10-20 VITALS — HEART RATE: 79 BPM | OXYGEN SATURATION: 100 % | SYSTOLIC BLOOD PRESSURE: 125 MMHG | DIASTOLIC BLOOD PRESSURE: 82 MMHG

## 2020-10-20 DIAGNOSIS — M54.12 CERVICAL RADICULOPATHY: ICD-10-CM

## 2020-10-20 DIAGNOSIS — M54.12 CERVICAL RADICULOPATHY: Primary | ICD-10-CM

## 2020-10-20 PROCEDURE — 62321 NJX INTERLAMINAR CRV/THRC: CPT | Performed by: PAIN MEDICINE

## 2020-10-20 ASSESSMENT — PAIN SCALES - GENERAL: PAINLEVEL: SEVERE PAIN (6)

## 2020-10-20 NOTE — LETTER
2020        Re:  Nicole Phoenix         [address] [place] [address]          :  1980        To whom it may concern:    RE: Nicole Phoenix    Patient was seen and treated today at our clinic.  Please excuse her from work today, 10/21/20 and 10/22/20.    Please contact me for questions or concerns.      Sincerely,        John Arias MD

## 2020-10-20 NOTE — NURSING NOTE
Pre-procedure Intake    Have you been fasting? NA    If yes, for how long? No     Are you taking a prescribed blood thinner such as coumadin, Plavix, Xarelto?    No    If yes, when did you take your last dose? No     Do you take aspirin?  No    If cervical procedure, have you held aspirin for 6 days?   No     Do you have any allergies to contrast dye, iodine, steroid and/or numbing medications?  NO    Are you currently taking antibiotics or have an active infection?  NO    Have you had a fever/elevated temperature within the past week? NO    Are you currently taking oral steroids? NO    Do you have a ? Yes     Are you pregnant or breastfeeding?  NO    Are the vital signs normal?  Yes    Pieter Glasgow MA

## 2020-10-20 NOTE — PROGRESS NOTES
[facility] - Procedure Note    Date of Visit: 10/20/2020    Procedure performed: C7-T1 interlaminar epidural steroid injection with fluoroscopic guidance  Diagnosis: Cervical spondylosis; Cervical radiculitis/radiculopathy  : John Arias MD  Anesthesia: none    Indications: Nicole Phoenix is a 40 year old female who is seen for cervical epidural steroid injection. The patient describes left-sided neck pain radiating to her left upper extremity. The patient has been exhibiting symptoms consistent with cervical intraspinal inflammation and radiculopathy. Symptoms have been persistent, disabling, and intermittently severe. The patient reports minimal improvement with conservative treatment, including medication/PT/prior injections wit.    Cervical MRI     FINDINGS: The postcontrast images do not demonstrate any abnormal  contrast enhancement in the cervical spinal cord associated with the  small syrinx noted on the noncontrast images from 5/15/2020.  Furthermore, there is no abnormal contrast enhancement anywhere within  the cervical spine.     There is rim enhancement of the multiloculated/lobulated fluid signal  intensity collection in the submandibular and sublingual spaces on the  left. Further evaluation with soft tissue neck CT recommended.                                                                      IMPRESSION:  1. No abnormal contrast enhancement in the cervical spinal cord  associated with the presumed syrinx described on the noncontrast  images acquired 5/15/2020.  2. Rim-enhancing multiloculated/lobulated collection in the sublingual  and submandibular spaces on the left may represent a ranula. Soft  tissue neck CT would be helpful for further characterization    Allergies:      Allergies   Allergen Reactions     No Known Drug Allergies      Poison Ivy Extract [Extract Of Poison Ivy]         Vitals:  /82   Pulse 79   SpO2 100%     Review of Systems: The  patient denies recent fever, chills, illness, use of antibiotics or anticoagulants. All other 10-point review of systems negative.     Procedure: The procedure and risks were explained, and informed written consent was obtained from the patient. Risks include but are not limited to: infection, bleeding, increased pain, and damage to soft tissue, nerve, muscle, and vasculature structures. After getting informed consent, patient was brought into the procedure suite and was placed in a prone position on the procedure table. A Pause for the Cause was performed. Patient was prepped and draped in sterile fashion.     The C7-T1 interspace was identified with use of fluoroscopy in AP view. A 25-gauge, 1.5 inch needle was used to anesthetize the skin and subcutaneous tissue entry site with a total of 2 ml of 1% lidocaine. Under fluoroscopic visualization, a 22-gauge, 3.5 inch Tuohy epidural needle was slowly advanced towards the epidural space a few millimeters left of midline. The latter part of the needle advancement was guided with fluoroscopy in the lateral view. The epidural space was identified using loss of resistance technique. After negative aspiration for heme and cerebrospinal fluid, a total of 1 mL of Omnipaque was injected to confirm needle placement. 9 mL of contrast was wasted. Epidurogram confirmed spread within the posterior epidural space. 2 ml of 10mg/ml of dexamethasone and 1 ml of preservative free 0.25% bupivacaine was injected. The needle was removed.  Images were saved to PACS.    The patient tolerated the procedure well, and there was no evidence of procedural complications. No new sensory or motor deficits were noted following the procedure. The patient was stable and able to ambulate on discharge home. Post-procedure instructions were provided.     Pre-procedure pain score: 8/10 in the neck, 8/10 in the arm  Post-procedure pain score: 4/10 in the neck, 4/10 in the arm    Assessment/Plan: Nicole AMEZQUITA  Alban is a 40 year old female s/p cervical interlaminar epidural steroid injection today for cervical spondylosis and radiculitis/radiculopathy.     1. Following today's procedure, the patient was advised to contact the [facility] for any of the following:   Fever, chills, or night sweats   New onset of pain, numbness, or weakness   Any questions/concerns regarding the procedure  If unable to contact the Pain Center, the patient was instructed to go to a local Emergency Room for any complications.   2. The patient will receive a follow-up call in 1 week.   3. Follow-up with provider in 2 weeks for post-procedure evaluation.    John Arias, [facility]

## 2020-10-20 NOTE — NURSING NOTE
Discharge Information    IV Discontiued Time:  NA    Amount of Fluid Infused:  NA    Discharge Criteria = When patient returns to baseline or as per MD order    Consciousness:  Pt is fully awake    Circulation:  BP +/- 20% of pre-procedure level    Respiration:  Patient is able to breathe deeply    O2 Sat:  Patient is able to maintain O2 Sat >92% on room air    Activity:  Moves 4 extremities on command    Ambulation:  Patient is able to stand and walk or stand and pivot into wheelchair    Dressing:  Clean/dry or No Dressing    Notes:   Discharge instructions and AVS given to patient    Patient meets criteria for discharge?  YES    Admitted to PCU?  No    Responsible adult present to accompany patient home?  Yes    Signature/Title:    shelly gutiérrez RN  RN Care Coordinator  [facility]

## 2020-10-20 NOTE — PATIENT INSTRUCTIONS
[facility]   Procedure Discharge Instructions    Today you saw:  Dr. John Arias     You had an:  Epidural steroid injection   -cervical      Medications used:  Lidocaine   Bupivacaine   Dexamethasone Omnipaque   Normal saline          Be cautious numbness and/or weakness in the upper extremities may occur for up to 6-8 hours after the procedure due to effect of the local anesthetic    Do not drive for 6 hours. The effect of the local anesthetic could slow your reflexes.     You may resume your regular activities after 24 hours    Avoid strenuous activity for the first 24 hours    You may shower, however avoid swimming, tub baths or hot tubs for 24 hours following your procedure    You may have a mild to moderate increase in pain for several days following the injection.    It may take up to 14 days for the steroid medication to start working although you may feel the effect as early as a few days after the procedure.       You may use ice packs for 10-15 minutes, 3 to 4 times a day at the injection site for comfort    Do not use heat to painful areas for 6 to 8 hours. This will give the local anesthetic time to wear off and prevent you from accidentally burning your skin.     Unless you have been directed to avoid the use of anti-inflammatory medications (NSAIDS), you may use medications such as ibuprofen, Aleve or Tylenol for pain control if needed.     If you were fasting, you may resume your normal diet and medications after the procedure    If you have diabetes, check your blood sugar more frequently than usual as your blood sugar may be higher than normal for 10-14 days following a steroid injection. Contact your doctor who manages your diabetes if your blood sugar is higher than usual    Possible side effects of steroids that you may experience include flushing, elevated blood pressure, increased appetite, mild headaches and restlessness.  All of these symptoms will get  better with time.    If you experience any of the following, call the Pain Clinic during work hours (Mon-Friday 8-4:30 pm) at [phone] or the Provider Line after hours at [phone]:  -Fever over 100 degree F  -Swelling, bleeding, redness, drainage, warmth at the injection site  -Progressive weakness or numbness in your legs or arms  -Loss of bowel or bladder function  -Unusual headache that is not relieved by Tylenol or other pain reliever  -Unusual new onset of pain that is not improving

## 2020-10-21 ENCOUNTER — MYC MEDICAL ADVICE (OUTPATIENT)
Dept: PALLIATIVE MEDICINE | Facility: CLINIC | Age: 40
End: 2020-10-21

## 2020-10-21 ENCOUNTER — MYC REFILL (OUTPATIENT)
Dept: PALLIATIVE MEDICINE | Facility: CLINIC | Age: 40
End: 2020-10-21

## 2020-10-21 DIAGNOSIS — M79.18 MYOFASCIAL MUSCLE PAIN: ICD-10-CM

## 2020-10-21 DIAGNOSIS — G43.109 MIGRAINE WITH AURA AND WITHOUT STATUS MIGRAINOSUS, NOT INTRACTABLE: ICD-10-CM

## 2020-10-21 RX ORDER — BUTALBITAL, ACETAMINOPHEN AND CAFFEINE 50; 325; 40 MG/1; MG/1; MG/1
1 TABLET ORAL DAILY PRN
Qty: 10 TABLET | Refills: 0 | Status: CANCELLED | OUTPATIENT
Start: 2020-10-21

## 2020-10-21 NOTE — TELEPHONE ENCOUNTER
Per patient myChart message:  From: Nicole Phoenix      Created: 10/21/2020 8:11 AM      I was looking through my FMLA paperwork that was filled out yesterday, and the restrictions state no heavy lifting. My employer needs a specific amount in pounds.     Thankyou so much, Nicole         Routed to provider.    Olinda RN-BSN  [facility]

## 2020-10-21 NOTE — TELEPHONE ENCOUNTER
Per patient myChart message:  From: Nicole ALIRIO Phoenix      Created: 10/21/2020 11:56 AM      Yes i do, can you email me that additional information, or can I download letters myself from here and email them to myself? Also can he add amount I can push and pull as well. And state that this is for a 6 month period and then will be reassesed?      _________  Mychart sent to pt:  From: Olinda He RN      Created: 10/21/2020 1:13 PM        Are you able to fax the forms to us at [phone] attn: Dr. Arias?  We do not have an email here for pts to send information to.  Or if you are able to have it scanned into your [employer] for us that would work also.     SYMONE Prakash-BSN  [facility]

## 2020-10-21 NOTE — TELEPHONE ENCOUNTER
Per patient myChart message:  From: Nicole Phoenix      Created: 10/21/2020 1:15 PM      Yes i can do that on Monday when I get back to work, I will Just let my DON know. Thank you so much for always getting back to me so promptly and for all your help  Nicole

## 2020-10-22 RX ORDER — BUTALBITAL, ACETAMINOPHEN AND CAFFEINE 50; 325; 40 MG/1; MG/1; MG/1
1 TABLET ORAL DAILY PRN
Qty: 10 TABLET | Refills: 0 | Status: SHIPPED | OUTPATIENT
Start: 2020-10-22 | End: 2020-12-15

## 2020-10-22 NOTE — TELEPHONE ENCOUNTER
Received [employer] message from patient requesting refill(s) for butalbital-acetaminophen-caffeine (ESGIC) -40 MG tablet      Last refilled on 10/8/2020    Pt last seen on 7/27/2020  Next appt scheduled for none    E-prescribe to:    [facility] [place], [place] - [address]     Will facilitate refill.

## 2020-10-26 ENCOUNTER — VIRTUAL VISIT (OUTPATIENT)
Dept: PALLIATIVE MEDICINE | Facility: CLINIC | Age: 40
End: 2020-10-26
Payer: COMMERCIAL

## 2020-10-26 DIAGNOSIS — G43.719 INTRACTABLE CHRONIC MIGRAINE WITHOUT AURA AND WITHOUT STATUS MIGRAINOSUS: ICD-10-CM

## 2020-10-26 DIAGNOSIS — M54.12 CERVICAL RADICULOPATHY: ICD-10-CM

## 2020-10-26 DIAGNOSIS — M79.18 MYOFASCIAL MUSCLE PAIN: Primary | ICD-10-CM

## 2020-10-26 DIAGNOSIS — R11.0 NAUSEA: ICD-10-CM

## 2020-10-26 PROCEDURE — 99214 OFFICE O/P EST MOD 30 MIN: CPT | Mod: GT | Performed by: PAIN MEDICINE

## 2020-10-26 RX ORDER — METAXALONE 400 MG/1
800 TABLET ORAL 2 TIMES DAILY PRN
Qty: 60 TABLET | Refills: 1 | Status: SHIPPED | OUTPATIENT
Start: 2020-10-26 | End: 2020-11-17

## 2020-10-26 RX ORDER — ONDANSETRON 4 MG/1
4 TABLET, FILM COATED ORAL EVERY 8 HOURS PRN
Qty: 60 TABLET | Refills: 0 | Status: SHIPPED | OUTPATIENT
Start: 2020-10-26 | End: 2021-03-22

## 2020-10-26 ASSESSMENT — PAIN SCALES - GENERAL: PAINLEVEL: SEVERE PAIN (6)

## 2020-10-26 NOTE — TELEPHONE ENCOUNTER
Forms received. Additional information needs to be add. Placed in Dr.Snitzer hernandez to complete.     Pieter Glasgow MA

## 2020-10-26 NOTE — PATIENT INSTRUCTIONS
----------------------------------------------------------------  [facility] Number:  [phone]     Call with any questions about your care and for scheduling assistance.     Calls are returned Monday through Friday between 8 AM and 4:30 PM. We usually get back to you within 2 business days depending on the issue/request.    If we are prescribing your medications:    For opioid medication refills, call the clinic or send a [employer] message 7 days in advance.  Please include:    Name of requested medication    Name of the pharmacy.    For non-opioid medications, call your pharmacy directly to request a refill. Please allow 3-4 days to be processed.     Per [place] State Law:    All controlled substance prescriptions must be filled within 30 days of being written.      For those controlled substances allowing refills, pickup must occur within 30 days of last fill.      We believe regular attendance is key to your success in our program!      Any time you are unable to keep your appointment we ask that you call us at least 24 hours in advance to cancel.This will allow us to offer the appointment time to another patient.     Multiple missed appointments may lead to dismissal from the clinic.

## 2020-10-26 NOTE — PROGRESS NOTES
"Nicole Phoenix is a 40 year old female who is being evaluated via a billable video visit.      The patient has been notified of following:     \"This video visit will be conducted via a call between you and your physician/provider. We have found that certain health care needs can be provided without the need for an in-person physical exam.  This service lets us provide the care you need with a video conversation.  If a prescription is necessary we can send it directly to your pharmacy.  If lab work is needed we can place an order for that and you can then stop by our lab to have the test done at a later time.    Video visits are billed at different rates depending on your insurance coverage.  Please reach out to your insurance provider with any questions.    If during the course of the call the physician/provider feels a video visit is not appropriate, you will not be charged for this service.\"    Patient has given verbal consent for Video visit? Yes  How would you like to obtain your AVS? MyChart  If you are dropped from the video visit, the video invite should be resent to: Text to cell phone: [phone]  Will anyone else be joining your video visit? No    Maribell Elder CMA (AAMA)      Video-Visit Details    Type of service:  Video Visit    Video Start Time: 10  Video End Time: 1025    Originating Location (pt. Location): Home    Distant Location (provider location):  [facility]     Platform used for Video Visit: [employer]    John Arias MD      Nicole Phoenix is a 40 year old female who is being evaluated via a billable video visit.      The patient has been notified of following:     \"This video visit will be conducted via a call between you and your physician/provider. We have found that certain health care needs can be provided without the need for an in-person physical exam.  This service lets us provide the care you need with a video conversation.  If a prescription is necessary " "we can send it directly to your pharmacy.  If lab work is needed we can place an order for that and you can then stop by our lab to have the test done at a later time.    Video visits are billed at different rates depending on your insurance coverage.  Please reach out to your insurance provider with any questions.    If during the course of the call the physician/provider feels a video visit is not appropriate, you will not be charged for this service.\"    Patient has given verbal consent for Video visit? Yes  How would you like to obtain your AVS? MyChart  If you are dropped from the video visit, the video invite should be resent to: Text to cell phone: [phone]  Will anyone else be joining your video visit? No        Video-    [facility]  Reason for consultation:    Primary Care Provider is Win Reyna  Pain medications are being prescribed by n/a.    Please see the [facility] health questionnaire which the patient completed and reviewed with me in detail.    Chief Complaint:    Chief Complaint   Patient presents with     Pain     Video visit due to COVID-19     MME prescribed prior to seeing patient:5  Current MME:  rec  - Further procedures recommended:    -Consider repeat cervical epidural steroid injection  - Medication Management:    -Short course of tramadol provided   -Norflex 100 mg twice daily  - Physical Therapy: We will strongly consider pain PT  - Clinical Health Psychologist to address issues of relaxation, behavioral change, coping style, and other factors important to improvement: Strongly consider  - Diagnostic Studies: no  - Urine toxicology screen today: None at this time any further prescriptions will need to be performed  - Follow up:    2 to 3 months  Interval     10/20 izabella reports benefit in her radiating symptoms.      Patient's main issue continues to be her severe headaches and upper neck pain.   Benefit with Maxalt  Benefit with " Fioricet  No benefit with Norflex.  Patient feels myofascial component is exacerbating her symptoms.    Denies any radiation into her arm  The pt cont to have some neck pain.  Pain is worse with extension and rotation.  Pain is worse with prolonged work.  Pain is worse with lifting.  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  Patient also has to do lifting.  Patient feels by 8 hours she can no longer tolerate her work.  Patient reports her weight restriction has been extremely beneficial.  Of note patient initially took a couple days off but now has been working almost at baseline.  Patient feels she may push herself too hard.    In terms of headaches      HA >15 month>4hrs-24 hours  Hard to focus  Blurry vision/ neg aura  Nausea /neg vomiting  + phono/photo  deneis rhino/tearing  Neg weakness  Denies any triggers  Benefit with fiorecet.  Benefit with Maxalt if she takes early enough    Has been on multiple medications for her headaches in the past.  Pain history: Of note patient no showed her first appointment      Nicole Phoenix is a 40 year old female who first started having problems with pain acute on chronic  Left>right-sided neck pain radiating to her upper extremity. The pain started 10yrs ago. No inciting event. The pain has gotten worse over the last 6 months no inciting event. The pt works as a care giver.   The pain radiates all the way into her hand right >Left.  The pain is variable in nature.  The pain varies from sharp shooting pain to a deep dull aching pain.  The pain is also spasming in nature.    Pain is worse with extension.  The pain is worse with rotation.  The pain is worse with lifting.  The pain is worse when bringing her arm above her head.  The pain is worse with working.    The patient notes benefit with massage in the past.  Patient notes some benefit with PT in the past.  Patient has had benefit with epidural steroid injections in the past. The pt recently had an epidural that helped  for approx 2 wks.       Of note the patient feels she has had difficulty with her grasp.  Right greater than left.    Gabapentin  Did not tolerate, the pt felt extremely tired. Could not focus at work    The patient is currently being seen by neurosurgery.    The patient has symptoms significantly affect her quality of life.  Pain rating: intensity  Averages 8/10 on a 0-10 scale.      Current treatments include:  Effexor  maxalt  lyrica- only take 25mg makes her tired   Fioricet  Tramadol  Previous medication treatments included:  Tramadol  zanaflex- but was only on 2 mg, flexerl,, norfle meloxicam x   , nausea  Gabapentin - lyrica- (only take 25mg makes her tired)  mobic  topamax   Other treatments have included:  Nicole Lynntitusjosué has been seen at a pain clinic in the past.  [facility]  PT: yes  Chiropractic: n  Acupuncture: n  TENs Unit: n  Injections:   GERTRUDIS benefit in the past last in  2 wks benefit.   C CIPRIANO  significant other radiating symptoms but he continues to have significant headaches  Past Medical History:  Past Medical History:   Diagnosis Date     Cervical high risk HPV (human papillomavirus) test positive 16    Neg      Cervicalgia 10/18/2016     Neck muscle spasm 10/18/2016     NO ACTIVE PROBLEMS      Syncope, unspecified syncope type 10/18/2016     Past Surgical History:  Past Surgical History:   Procedure Laterality Date     C  DELIVERY+POSTPARTUM CARE       HC REMOVAL OF TONSILS,<13 Y/O       HC REPAIR EXTEN TENDON FINGER W/O GRAFT, EACH  03/15/09    right ring finger     Medications:  Current Outpatient Medications   Medication Sig Dispense Refill     butalbital-acetaminophen-caffeine (ESGIC) -40 MG tablet Take 1 tablet by mouth daily as needed for headaches 10 tablet 0     metaxalone 400 MG TABS Take 800 mg by mouth 2 times daily as needed for moderate pain 60 tablet 1     ondansetron (ZOFRAN) 4 MG tablet Take 1 tablet (4 mg) by mouth every 8 hours as needed for nausea 60  tablet 0     pregabalin (LYRICA) 25 MG capsule Take 1 capsule (25 mg) by mouth 2 times daily 60 capsule 1     rizatriptan (MAXALT) 10 MG tablet Take 1 tablet (10 mg) by mouth at onset of headache for migraine (OK to retake one tablet two hours after first if not relief) 10 tablet 1     traMADol (ULTRAM) 50 MG tablet Take 0.5-1 tablets (25-50 mg) by mouth daily as needed for severe pain Ok to fill 9/29/20 and start 9/29/20, To last 1 month 10 tablet 0     venlafaxine (EFFEXOR-XR) 75 MG 24 hr capsule Take 3 capsules (225 mg) by mouth daily 270 capsule 0     Allergies:     Allergies   Allergen Reactions     No Known Drug Allergies      Poison Ivy Extract [Extract Of Poison Ivy]      Social History:  Home situation: [facility]  Occupation/Schooling: y      Family history:  Family History   Problem Relation Age of Onset     Breast Cancer Maternal Grandmother      Alcohol/Drug Maternal Grandfather         recovering alcoholic     Diabetes Paternal Grandmother      Cerebrovascular Disease Paternal Grandmother      Family history of headaches: n    Review of Systems:  Skin: negative  Eyes: negative  Ears/Nose/Throat: negative  Respiratory: No shortness of breath, dyspnea on exertion, cough, or hemoptysis  Cardiovascular: negative  Gastrointestinal: negative  Genitourinary: negative  Musculoskeletal: negative  Neurologic: negative  Psychiatric: negative  Hematologic/Lymphatic/Immunologic: negative  Endocrine: negative    Physical Exam:  There were no vitals filed for this visit.  Exam:  Constitutional: healthy, alert and no distress  Respiratory: Speaking in full sentences no accessory muscles use   Psychiatric: mentation appears normal and affect normal/bright    Musculoskeletal exam:  Gait/Station/Posture: wnl  Cervical spine: ROMwnl  Mildly positive Spurling on the right when instructed how to perform.     Motor: Able to easily over, gravity      Diagnostic tests:                                                                      IMPRESSION:  1. No abnormal contrast enhancement in the cervical spinal cord  associated with the presumed syrinx described on the noncontrast  images acquired 5/15/2020.  2. Rim-enhancing multiloculated/lobulated collection in the sublingual  and submandibular spaces on the left may represent a ranula. Soft  tissue neck CT would be helpful for further characterization.           Assessment/Plan:  Nicole Phoenix is a 40 year old female who presents with the complaints of acute on chronic bilateral neck pain left greater than right radiating to her upper extremity right greater than left.   Nicole was seen today for pain.    Diagnoses and all orders for this visit:    Myofascial muscle pain  -     metaxalone 400 MG TABS; Take 800 mg by mouth 2 times daily as needed for moderate pain    Nausea  -     ondansetron (ZOFRAN) 4 MG tablet; Take 1 tablet (4 mg) by mouth every 8 hours as needed for nausea    Intractable chronic migraine without aura and without status migrainosus    Cervical radiculopathy         - Further procedures recommended:    -Consider cervical MBB/RFA T ON, C3, C4   -Consider Botox (patient meets criteria based on symptoms and medications tried in the past)  - Medication Management:    -DC Norflex start Skelaxin will slowly titrate as tolerated   -Discussed given polypharmacy trial of holding Lyrica   -Continue Maxalt for abortive therapy   -Continue Fioricet for abortive therapy   -Discussed concept of CGRP antagonist/inhibitors   -Zofran as needed  - Physical Therapy: We will strongly consider pain PT  - Clinical Health Psychologist to address issues of relaxation, behavioral change, coping style, and other factors important to improvement: Strongly consider  Urine toxicology-none at this time  - Follow up:    2 to 3 months     -Would recommend continued weight restriction (15 pounds) and limiting shift to 8 hours for the next week     -We will sign paperwork tomorrow.    Total time spent was 25  minutes, John Arias MD  [facility]  This note was created with voice recognition software, and while reviewed for accuracy, typos may remain.

## 2020-10-27 ENCOUNTER — MYC REFILL (OUTPATIENT)
Dept: PALLIATIVE MEDICINE | Facility: CLINIC | Age: 40
End: 2020-10-27

## 2020-10-27 DIAGNOSIS — M54.12 CERVICAL RADICULOPATHY: ICD-10-CM

## 2020-10-27 RX ORDER — TRAMADOL HYDROCHLORIDE 50 MG/1
25-50 TABLET ORAL DAILY PRN
Qty: 10 TABLET | Refills: 0 | Status: CANCELLED | OUTPATIENT
Start: 2020-10-27

## 2020-10-28 ENCOUNTER — MYC MEDICAL ADVICE (OUTPATIENT)
Dept: PALLIATIVE MEDICINE | Facility: CLINIC | Age: 40
End: 2020-10-28

## 2020-10-28 DIAGNOSIS — M79.18 MYOFASCIAL MUSCLE PAIN: Primary | ICD-10-CM

## 2020-10-28 RX ORDER — TRAMADOL HYDROCHLORIDE 50 MG/1
25-50 TABLET ORAL DAILY PRN
Qty: 10 TABLET | Refills: 0 | Status: SHIPPED | OUTPATIENT
Start: 2020-10-28 | End: 2020-11-27

## 2020-10-28 NOTE — TELEPHONE ENCOUNTER
Patient requesting refill(s) of traMADol (ULTRAM) 50 MG tablet     Last dispensed from pharmacy on 10/02/2020    Pt last seen by prescribing provider on 10/26/2020  Next appt scheduled for none       checked in the past 6 months? Yes If no, print current report and give to RN    Last urine drug screen date None   Current opioid agreement on file (completed within the last year) No Date of opioid agreement: None     E-prescribe to [facility] [place], [place]  pharmacy    Will route to nursing [place] for review and preparation of prescription(s).

## 2020-10-28 NOTE — TELEPHONE ENCOUNTER
From: Nicole Phoenix      Created: 10/28/2020 9:10 AM        *-*-*This message has not been handled.*-*-*    Good morning, unfortunately the metaxalone prescription has a 230$ copay and i cannot afford to pay that for one prescription. I did send my request for my tramadol to help with my pain, especially on the days I work. Please let me know if you have any other ideas for pain during day hours.  Thanks, Nicole         Will forward [employer] message to Dr. Arias to review and advise.    Glenn Hook, RN  Care Coordinator   [facility]

## 2020-10-28 NOTE — TELEPHONE ENCOUNTER
Medication refill information reviewed.     Due date for traMADol (ULTRAM) 50 MG tablet is 10/29/20     Prescriptions prepped for review.     Will route to provider.     Glenn Hook, RN  Care Coordinator   [facility] [place]

## 2020-10-29 NOTE — TELEPHONE ENCOUNTER
Informed patient that their refill of traMADol (ULTRAM) 50 MG tablet was E-scribed to the pharmacy. Patient was informed of fill and start date.    Darcy Ness CMA  [facility]  [place]

## 2020-10-29 NOTE — TELEPHONE ENCOUNTER
Per patient myChart message:  From: Olinda He RN      Created: 10/29/2020 3:21 PM      Dr. Hugo Brown says another muscle relaxer medication option is Chlorzoxazone.    Do you want to try this and if so what pharmacy?     SYMONE Prakash-BSN  [facility]

## 2020-10-30 RX ORDER — CHLORZOXAZONE 500 MG/1
500 TABLET ORAL 2 TIMES DAILY PRN
Qty: 60 TABLET | Refills: 1 | Status: SHIPPED | OUTPATIENT
Start: 2020-10-30 | End: 2020-11-13 | Stop reason: SINTOL

## 2020-11-02 ENCOUNTER — MYC MEDICAL ADVICE (OUTPATIENT)
Dept: FAMILY MEDICINE | Facility: CLINIC | Age: 40
End: 2020-11-02

## 2020-11-03 ENCOUNTER — MYC MEDICAL ADVICE (OUTPATIENT)
Dept: PALLIATIVE MEDICINE | Facility: CLINIC | Age: 40
End: 2020-11-03

## 2020-11-03 NOTE — TELEPHONE ENCOUNTER
Per patient myChart message:  From: Nicole ALIRIO Phoenix      Created: 11/3/2020 9:14 AM      Good morning, I am having another horrible day with my neck. I have more bad days then good, I just wanted to know what should be my next step for my bulging discs? Should I contact the surgeon that reviewed my MRI initially? Otherwise I feel like i should get a second opinion. Also I am getting hives all over my face and chest from the chlorzoxone.  Nicole Torres       _______________  Pt had cervical epidural steroid injection on 10/20/20.    Mychart sent to pt:  From: Olinda He RN      Created: 11/3/2020 9:22 AM      Hi Nicole.    So sorry to hear!     Stop the chlozoxone now if you haven't already.  If you have any problems breathing you'll need to go the ED now.  If not, have you ever taken oral benadryl? This would help w/ the itching and hives but may make you tired.  I think you have tried a lot of muscle relaxants in the past.  Is there a specific one that you would would like to retry? If you think this would be helpful for you what pharmacy do you want to us?     In regards to your neck do you have any new numbness, tingling or weakness in neck, shoulders and/or arms?     I will have Dr. Arias review for you but get back to me as soon as you can on the questions above.     SYMONE Prakash-BSN  [facility]

## 2020-11-10 ENCOUNTER — MYC MEDICAL ADVICE (OUTPATIENT)
Dept: PALLIATIVE MEDICINE | Facility: CLINIC | Age: 40
End: 2020-11-10

## 2020-11-10 DIAGNOSIS — M54.12 CERVICAL RADICULOPATHY: Primary | ICD-10-CM

## 2020-11-10 NOTE — TELEPHONE ENCOUNTER
I was recommend seeing the original surgeon first before reaching out for a second opinion.  I would definitely hold the Lorzone given the reaction.

## 2020-11-11 ENCOUNTER — MYC MEDICAL ADVICE (OUTPATIENT)
Dept: FAMILY MEDICINE | Facility: CLINIC | Age: 40
End: 2020-11-11

## 2020-11-11 ENCOUNTER — MYC MEDICAL ADVICE (OUTPATIENT)
Dept: PALLIATIVE MEDICINE | Facility: CLINIC | Age: 40
End: 2020-11-11

## 2020-11-13 ENCOUNTER — TELEPHONE (OUTPATIENT)
Dept: NEUROSURGERY | Facility: CLINIC | Age: 40
End: 2020-11-13

## 2020-11-13 ENCOUNTER — MYC MEDICAL ADVICE (OUTPATIENT)
Dept: PALLIATIVE MEDICINE | Facility: CLINIC | Age: 40
End: 2020-11-13

## 2020-11-13 NOTE — TELEPHONE ENCOUNTER
Patient calling because she would like to speak with someone regarding her MRI that Sallie had reviewed with her. Please call the patient back @ [phone]

## 2020-11-13 NOTE — TELEPHONE ENCOUNTER
Per patient myChart message:  From: Nicole Phoenix      Created: 11/13/2020 1:59 PM      Did he send a script for the Skelaxin and Fioracet? Thats what I will start with, I actually have an appt on Monday with neurosurgery. Given my MRI results and symptoms they wanted to see me asap.        ______________________________________  Mychart sent to pt:  From: Olinda He RN      Created: 11/13/2020 2:10 PM      You have a refill remaining at your pharmacy for the skelaxin.    The fiorcet was last prescribed on 10/22/20. Did you  that refill?     SYMONE Prakash-BSN  [facility]

## 2020-11-13 NOTE — TELEPHONE ENCOUNTER
Spoke with Halle Holt CNP. Advised to schedule with Dr. Anderson as patient is wanting to see MD. Scheduled for 11/16. Updated patient and will send my chart message with appointment details.

## 2020-11-13 NOTE — TELEPHONE ENCOUNTER
See the 11/10/20 mychart encounter for more information.    Olinda He RN-BSN  [facility]

## 2020-11-13 NOTE — TELEPHONE ENCOUNTER
Per patient myChart message:  From: Nicole Phoenix      Created: 11/13/2020 2:20 PM      Im getting so confused by the medications because you are using different names then what is actually getting filled, trade vs generic. I will call my pharmacy when I get off work, I haven't picked anything up in awhile, I know i have a med waiting due to them not having any of that type of med, but I thought that was the one I broke out into hives from. Thankyou, I will figure it out from here.       ____________________________  Mychart sent to pt:    From: Olinda He RN      Created: 11/13/2020 2:46 PM        Sorry about that. I used the same med names you used.  Here are the refills that were sent to your pharmacy that you are asking about:     Fiorcet:      butalbital-acetaminophen-caffeine  -40 MG tablet 10 tablet                                       Sig - Route: Take 1 tablet by mouth daily as needed for headaches - Oral  Sent to pharmacy on 10/22/2020        And the skelaxin (metaxalone):     metaxalone 400 MG TABS        60 tablet                                       Sig - Route: Take 800 mg by mouth 2 times daily as needed for moderate pain -  Sent to pharmacy on 10/26/2020 with 1 additional refill

## 2020-11-13 NOTE — TELEPHONE ENCOUNTER
Per patient myChart message:  From: Nicole Phoenix      Created: 11/10/2020 7:46 AM      Good morning, Dr. Arias and I discussed some other options regarding treatment for my headaches. Can you please have him send me the information on that because I am still having horrible headaches almost daily. I also never heard back on what he thought was best as far as my injections being ineffective and what my next step should be  Thank-you, Nicole      And   From: Nicole Phoenix      Created: 11/11/2020 5:11 PM      I am not even sure what muscle relaxer to even try again, after going back to lifting per usual the pain is way worst then when I'm off taking it easy. The tramadol is ineffective at this point, so I am not sure what other options I have.I will reach out to my surgeon tomorrow to see what his thought are regarding my increased pain and ineffective non surgical interventions. I appreciate all your help, thank you.  Nicole      And   From: Nicole Phoenix      Created: 11/13/2020 8:22 AM      I was just informed that Sallie HOOVER who reviewed my results no longer works for [employer], I am waiting for someone from the care team to call me back. My pain has been 8/10 all week and really need some relief, please get back to me with some options until u can see a neurosurgeon   Nicole Torres      ______________________________    11/16/20: appointment with neurosurgeon    Mary sent to pt:  From: Olinda He RN      Created: 11/13/2020 1:13 PM      Mark Rivera,  Per Dr. Arias's plan from your 10/26 visit he recommended. Are you interested in any?:  Further procedures recommended:               -Consider cervical MBB/RFA T ON, C3, C4              -Consider Botox (patient meets criteria based on symptoms and medications tried in the past)  - Medication Management:               -DC Norflex start Skelaxin will slowly titrate as tolerated              -Discussed given polypharmacy trial of holding Lyrica               -Continue Maxalt for abortive therapy              -Continue Fioricet for abortive therapy              -Discussed concept of CGRP antagonist/inhibitors              -Zofran as needed  - Physical Therapy: We will strongly consider pain PT  - Clinical Health Psychologist to address issues of relaxation, behavioral change, coping style, and other factors important to improvement: Strongly consider  - Follow up:       2 to 3 months     You have tried many muscle relaxants: There may not be another muscle relaxant to try. According to your chart you have tried: tizanidine, flexeril, robaxin, parafon forte and metaxalone. Would you want to restart any of those minus the ones that you had reactions to?     SYMONE Prakash-BSN  [facility]

## 2020-11-13 NOTE — TELEPHONE ENCOUNTER
See the 11/10/20 mychart encounter for more information.    Olinda He RN-BSN  [facility]

## 2020-11-13 NOTE — TELEPHONE ENCOUNTER
Last Visit: 5/7/20  Next Visit: None     Name of Provider: Sherry Reeves CNP and SNEHA Childress    Assessment:  Onset - has been going on since May  Location - Neck, right arm  Duration - continuous throughout the day   Characteristics - pain and numbness especially into the right arm   Aggravating/Alleviating - nothing seems to make it better or worse, taking Advil 800 mg TID  Related Systems - numbness in the right arm and hand; hard to focus, double vision, migraines and headaches   Timing - same throughout the day   Severity - 9-10/10    Recommendation given: Informed patient that I will route message to care team to see if she can come in and be seen with an DIPIKA or can see surgeon. Patient has failed multiple injections getting only about ~1 week of relief and has also tried PT without relief.

## 2020-11-16 ENCOUNTER — MYC MEDICAL ADVICE (OUTPATIENT)
Dept: PALLIATIVE MEDICINE | Facility: CLINIC | Age: 40
End: 2020-11-16

## 2020-11-16 ENCOUNTER — OFFICE VISIT (OUTPATIENT)
Dept: NEUROSURGERY | Facility: CLINIC | Age: 40
End: 2020-11-16
Attending: NEUROLOGICAL SURGERY
Payer: COMMERCIAL

## 2020-11-16 VITALS
SYSTOLIC BLOOD PRESSURE: 126 MMHG | DIASTOLIC BLOOD PRESSURE: 90 MMHG | HEIGHT: 62 IN | HEART RATE: 73 BPM | OXYGEN SATURATION: 99 % | WEIGHT: 129 LBS | BODY MASS INDEX: 23.74 KG/M2

## 2020-11-16 DIAGNOSIS — M54.2 CERVICALGIA: Primary | ICD-10-CM

## 2020-11-16 DIAGNOSIS — M79.18 MYOFASCIAL MUSCLE PAIN: Primary | ICD-10-CM

## 2020-11-16 PROCEDURE — 99213 OFFICE O/P EST LOW 20 MIN: CPT | Performed by: NEUROLOGICAL SURGERY

## 2020-11-16 ASSESSMENT — MIFFLIN-ST. JEOR: SCORE: 1208.39

## 2020-11-16 ASSESSMENT — PAIN SCALES - GENERAL: PAINLEVEL: SEVERE PAIN (7)

## 2020-11-16 NOTE — TELEPHONE ENCOUNTER
Per patient myChart message:  From: Nicole Phoenix      Created: 11/16/2020 12:01 PM      Dr. Anderson suggests a spinal stimulator, i am not a surgical candidate. At this point i am willing to try anything to make my pain at least manageable. Finding a pain med/muscle relaxer that is effective is the only option qt this point. Should I try and double my lyrica dose? And what does Dr. Arias feel about the spine stimulation?  Thank you Nicole

## 2020-11-16 NOTE — TELEPHONE ENCOUNTER
Please clarify exactly how patient is taking Lyrica right now.      In terms of a spinal cord stimulator I do not place cervical stimulators only lumbar      I am more than happy to reach out to one of my colleagues who does.

## 2020-11-16 NOTE — TELEPHONE ENCOUNTER
Per patient myChart message:  From: iNcole Lynntitusjosué      Created: 11/16/2020 9:09 AM      The reason I did not  my metaxalone is because it has a 200$ co pay, and i cannot afford that.   Nicole       _______________    Mychart sent to pt:  From: Olinda He RN      Created: 11/16/2020 11:30 AM        Mark Rivera.  Let us know how it goes with neurosurgery today and we can go from there.     Olinda Shelton RN-BSN  [facility]

## 2020-11-16 NOTE — TELEPHONE ENCOUNTER
See the 11/10/20 mychart encounter for more information.    Olinda He RN-BSN  [facility]

## 2020-11-16 NOTE — LETTER
2020         RE: Nicole Phoenix  [address] [place] [address]        Dear Colleague,    Thank you for referring your patient, Nicole Phoenix, to the [facility] [place]. Please see a copy of my visit note below.    40F w/ neck and arm pain.  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  MR Cervical with thin syrinx, no stenosis.       Past Medical History:   Diagnosis Date     Cervical high risk HPV (human papillomavirus) test positive 16    Neg      Cervicalgia 10/18/2016     Neck muscle spasm 10/18/2016     NO ACTIVE PROBLEMS      Syncope, unspecified syncope type 10/18/2016     Past Surgical History:   Procedure Laterality Date     C  DELIVERY+POSTPARTUM CARE       HC REMOVAL OF TONSILS,<13 Y/O       HC REPAIR EXTEN TENDON FINGER W/O GRAFT, EACH  03/15/09    right ring finger     Social History     Socioeconomic History     Marital status:      Spouse name: Not on file     Number of children: Not on file     Years of education: Not on file     Highest education level: Not on file   Occupational History     Not on file   Social Needs     Financial resource strain: Not on file     Food insecurity     Worry: Not on file     Inability: Not on file     Transportation needs     Medical: Not on file     Non-medical: Not on file   Tobacco Use     Smoking status: Current Some Day Smoker     Packs/day: 0.10     Years: 4.00     Pack years: 0.40     Types: Cigarettes     Smokeless tobacco: Never Used   Substance and Sexual Activity     Alcohol use: No     Drug use: No     Sexual activity: Yes     Partners: Male     Birth control/protection: Surgical, Pill   Lifestyle     Physical activity     Days per week: Not on file     Minutes per session: Not on file     Stress: Not on file   Relationships     Social connections     Talks on phone: Not on file     Gets  "together: Not on file     Attends Advent service: Not on file     Active member of club or organization: Not on file     Attends meetings of clubs or organizations: Not on file     Relationship status: Not on file     Intimate partner violence     Fear of current or ex partner: Not on file     Emotionally abused: Not on file     Physically abused: Not on file     Forced sexual activity: Not on file   Other Topics Concern      Service Yes     Comment: [id number]  Never deployed outside of the [place]     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 Not Asked   Social History Narrative     and lives at home with her  and daughter.  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             Family History   Problem Relation Age of Onset     Breast Cancer Maternal Grandmother      Alcohol/Drug Maternal Grandfather         recovering alcoholic     Diabetes Paternal Grandmother      Cerebrovascular Disease Paternal Grandmother         ROS: 10 point ROS neg other than the symptoms noted above in the HPI.    Physical Exam  BP (!) 126/90 (BP Location: Left arm, Patient Position: Sitting)   Pulse 73   Ht 1.575 m (5' 2\")   Wt 58.5 kg (129 lb)   SpO2 99%   BMI 23.59 kg/m    HEENT:  Normocephalic, atraumatic.  PERRLA.  EOM s intact.  Visual fields full to gross exam  Neck:  Supple, non-tender, without lymphadenopathy.  Heart:  No peripheral edema  Lungs:  No SOB  Abdomen:  Non-distended.   Skin:  Warm and dry.  Extremities:  No " edema, cyanosis or clubbing.  Psychiatric:  No apparent distress  Musculoskeletal:  Normal bulk and tone    NEUROLOGICAL EXAMINATION:     Mental status:  Alert and Oriented x 3, speech is fluent.  Cranial nerves:  II-XII intact.   Motor:    Shoulder Abduction:  Right:  5/5   Left:  5/5  Biceps:                      Right:  5/5   Left:  5/5  Triceps:                     Right:  5/5   Left:  5/5  Wrist Extensors:       Right:  5/5   Left:  5/5  Wrist Flexors:           Right:  5/5   Left:  5/5  interosseus :            Right:  5/5   Left:  5/5  Hip Flexion:                Right: 5/5  Left:  5/5  Quadriceps:             Right:  5/5  Left:  5/5  Hamstrings:             Right:  5/5  Left:  5/5  Gastroc Soleus:        Right:  5/5  Left:  5/5  Tib/Ant:                      Right:  5/5  Left:  5/5  EHL:                     Right:  5/5  Left:  5/5  Sensation:  Intact  Reflexes:  Negative Babinski.  Negative Clonus.  Negative Bob's.  Coordination:  Smooth finger to nose testing.   Negative pronator drift.  Smooth tandem walking.    A/P:  40F w/ neck and arm pain    I had a discussion with the patient, reviewing the history, symptoms, and imaging  Discussed that no high grade structural abnormalities or stenosis  Discussed possible SCS trial as a next step  She will follow up with Dr. Arias and Pain team         Again, thank you for allowing me to participate in the care of your patient.        Sincerely,        Ryan Anderson MD

## 2020-11-16 NOTE — TELEPHONE ENCOUNTER
From: Glenn Hook RN      Created: 11/16/2020 3:28 PM        Hugo Rivera, John Kent, [facility] HospitalsicianSigned         Dr Arias would like you to clarify exactly how you are taking the Lyrica right now.  Dose and frequency.       In terms of a spinal cord stimulator He does not place cervical stimulators only lumbar, he is more than happy to reach out to one of my colleagues who does.     Please advise with how you are using the Lyrica and if you want Dr. Arias to reach out to a colleague who does cervical spinal cord stimulators.     Thanks     Glenn Hook RN  Care Coordinator   [facility]         Above sent to Pt.    Glenn Hook RN  Care Coordinator   [facility]

## 2020-11-16 NOTE — NURSING NOTE
"Nicole Phoenix is a 40 year old female who presents for:  Chief Complaint   Patient presents with     Consult     Failed injection / cervical        Initial Vitals:  BP (!) 126/90 (BP Location: Left arm, Patient Position: Sitting)   Pulse 73   Ht 5' 2\" (1.575 m)   Wt 129 lb (58.5 kg)   SpO2 99%   BMI 23.59 kg/m   Estimated body mass index is 23.59 kg/m  as calculated from the following:    Height as of this encounter: 5' 2\" (1.575 m).    Weight as of this encounter: 129 lb (58.5 kg).. Body surface area is 1.6 meters squared. BP completed using cuff size: regular  Severe Pain (7)    Nursing Comments: Patient presents for consultation cervical     Toney Vega MA  "

## 2020-11-16 NOTE — PROGRESS NOTES
40F w/ neck and arm pain.  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  MR Cervical with thin syrinx, no stenosis.       Past Medical History:   Diagnosis Date     Cervical high risk HPV (human papillomavirus) test positive 16    Neg      Cervicalgia 10/18/2016     Neck muscle spasm 10/18/2016     NO ACTIVE PROBLEMS      Syncope, unspecified syncope type 10/18/2016     Past Surgical History:   Procedure Laterality Date     C  DELIVERY+POSTPARTUM CARE       HC REMOVAL OF TONSILS,<11 Y/O       HC REPAIR EXTEN TENDON FINGER W/O GRAFT, EACH  03/15/09    right ring finger     Social History     Socioeconomic History     Marital status:      Spouse name: Not on file     Number of children: Not on file     Years of education: Not on file     Highest education level: Not on file   Occupational History     Not on file   Social Needs     Financial resource strain: Not on file     Food insecurity     Worry: Not on file     Inability: Not on file     Transportation needs     Medical: Not on file     Non-medical: Not on file   Tobacco Use     Smoking status: Current Some Day Smoker     Packs/day: 0.10     Years: 4.00     Pack years: 0.40     Types: Cigarettes     Smokeless tobacco: Never Used   Substance and Sexual Activity     Alcohol use: No     Drug use: No     Sexual activity: Yes     Partners: Male     Birth control/protection: Surgical, Pill   Lifestyle     Physical activity     Days per week: Not on file     Minutes per session: Not on file     Stress: Not on file   Relationships     Social connections     Talks on phone: Not on file     Gets together: Not on file     Attends Holiness service: Not on file     Active member of club or organization: Not on file     Attends meetings of clubs or organizations: Not on file     Relationship status: Not on file     Intimate partner violence     Fear of  "current or ex partner: Not on file     Emotionally abused: Not on file     Physically abused: Not on file     Forced sexual activity: Not on file   Other Topics Concern      Service Yes     Comment: [id number]  Never deployed outside of the [place]     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 Not Asked   Social History Narrative     and lives at home with her  and daughter.  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             Family History   Problem Relation Age of Onset     Breast Cancer Maternal Grandmother      Alcohol/Drug Maternal Grandfather         recovering alcoholic     Diabetes Paternal Grandmother      Cerebrovascular Disease Paternal Grandmother         ROS: 10 point ROS neg other than the symptoms noted above in the HPI.    Physical Exam  BP (!) 126/90 (BP Location: Left arm, Patient Position: Sitting)   Pulse 73   Ht 1.575 m (5' 2\")   Wt 58.5 kg (129 lb)   SpO2 99%   BMI 23.59 kg/m    HEENT:  Normocephalic, atraumatic.  PERRLA.  EOM s intact.  Visual fields full to gross exam  Neck:  Supple, non-tender, without lymphadenopathy.  Heart:  No peripheral edema  Lungs:  No SOB  Abdomen:  Non-distended.   Skin:  Warm and dry.  Extremities:  No edema, cyanosis or clubbing.  Psychiatric:  No apparent distress  Musculoskeletal:  Normal bulk and tone    NEUROLOGICAL EXAMINATION:     Mental status:  Alert and Oriented x 3, speech is fluent.  Cranial nerves:  II-XII intact.   Motor:    Shoulder Abduction:  " Right:  5/5   Left:  5/5  Biceps:                      Right:  5/5   Left:  5/5  Triceps:                     Right:  5/5   Left:  5/5  Wrist Extensors:       Right:  5/5   Left:  5/5  Wrist Flexors:           Right:  5/5   Left:  5/5  interosseus :            Right:  5/5   Left:  5/5  Hip Flexion:                Right: 5/5  Left:  5/5  Quadriceps:             Right:  5/5  Left:  5/5  Hamstrings:             Right:  5/5  Left:  5/5  Gastroc Soleus:        Right:  5/5  Left:  5/5  Tib/Ant:                      Right:  5/5  Left:  5/5  EHL:                     Right:  5/5  Left:  5/5  Sensation:  Intact  Reflexes:  Negative Babinski.  Negative Clonus.  Negative Bob's.  Coordination:  Smooth finger to nose testing.   Negative pronator drift.  Smooth tandem walking.    A/P:  40F w/ neck and arm pain    I had a discussion with the patient, reviewing the history, symptoms, and imaging  Discussed that no high grade structural abnormalities or stenosis  Discussed possible SCS trial as a next step  She will follow up with Dr. Arias and Pain team

## 2020-11-17 ENCOUNTER — MYC MEDICAL ADVICE (OUTPATIENT)
Dept: NEUROSURGERY | Facility: CLINIC | Age: 40
End: 2020-11-17

## 2020-11-17 RX ORDER — CELECOXIB 100 MG/1
100 CAPSULE ORAL 2 TIMES DAILY
Qty: 60 CAPSULE | Refills: 1 | Status: SHIPPED | OUTPATIENT
Start: 2020-11-17 | End: 2020-12-31

## 2020-11-17 NOTE — TELEPHONE ENCOUNTER
Per patient myChart message:  From: Nicole Phoenix      Created: 11/16/2020 3:38 PM        I was on 25mg BID, and it was ineffective. At my last appt he suggested holding it d/t me introducing a new one (which I am not taking now because of breaking out into hives) he also stated i wasn't at high dose so we could try and titrate up to see if that woule help. yes i would like him to reach out to one of his colleagues regarding the cervical spinal stimulator.       ___________________________________    Mychart sent to pt:  From: Olinda He RN      Created: 11/17/2020 9:50 AM      Mark Rivera.  Have you restarted the lyrica or are you still off?  Did you experience any side effects from it?     SYMONE Prakash-BSN  [facility]

## 2020-11-17 NOTE — TELEPHONE ENCOUNTER
Per patient myChart message:  From: Nicole Lynntitusjosué      Created: 11/16/2020 12:41 PM      Along with the appt for the spinal cord stimulator can I please get a script sent to my pharmacy for tizanidine (I would like to try this muscle relaxer again) as well as a king-2 inhibitor like Dr. Arias suggested. Thankyou so much.         See the other encounter for information on spinal cord stimulator.    Dr. Arias metaxalone was too expensive and pt did not fill.  Can you prescribe tizanidine again for her? Last script was 5/2020 and the sig was for 2mg 1 tab TID and per your eval notes this was the highest dosage. [facility].    Olinda, RN-BSN  [facility]

## 2020-11-18 ENCOUNTER — MYC MEDICAL ADVICE (OUTPATIENT)
Dept: PALLIATIVE MEDICINE | Facility: CLINIC | Age: 40
End: 2020-11-18

## 2020-11-18 NOTE — TELEPHONE ENCOUNTER
Dr. Arias pt restarted lyrica at 25mg BID. This is the dose she was previously on w/ no effect and no side effects. She wants titration directions. She also wants more information on the spinal cord stimulator.    SYMONE Prakash-BSN  [facility]

## 2020-11-18 NOTE — TELEPHONE ENCOUNTER
Note placed on Lyrica Medication that Dose was increased.  Pt will need refill early.    Glenn Hook, RN  Care Coordinator   [facility] [place]

## 2020-11-18 NOTE — TELEPHONE ENCOUNTER
Message sent directly to patient    Referral sent to Dash Hook neurosurgeon to discuss possible surgical SCS.  He will need to decide whether it is indicated or not.  In terms of     Lyrica please take 25mg BID for (1 week before increasing to 50 mg twice daily.

## 2020-11-27 ENCOUNTER — MYC MEDICAL ADVICE (OUTPATIENT)
Dept: PALLIATIVE MEDICINE | Facility: CLINIC | Age: 40
End: 2020-11-27

## 2020-11-27 ENCOUNTER — MYC REFILL (OUTPATIENT)
Dept: PALLIATIVE MEDICINE | Facility: CLINIC | Age: 40
End: 2020-11-27

## 2020-11-27 DIAGNOSIS — M54.12 CERVICAL RADICULOPATHY: ICD-10-CM

## 2020-11-27 RX ORDER — TRAMADOL HYDROCHLORIDE 50 MG/1
25-50 TABLET ORAL DAILY PRN
Qty: 10 TABLET | Refills: 0 | Status: SHIPPED | OUTPATIENT
Start: 2020-11-27 | End: 2020-12-21

## 2020-11-27 RX ORDER — TRAMADOL HYDROCHLORIDE 50 MG/1
25-50 TABLET ORAL DAILY PRN
Qty: 10 TABLET | Refills: 0 | Status: CANCELLED | OUTPATIENT
Start: 2020-11-27

## 2020-11-27 NOTE — TELEPHONE ENCOUNTER
See the other 11/27/20 mychart encounter for more information.    Olinda He RN-BSN  [facility]

## 2020-11-27 NOTE — TELEPHONE ENCOUNTER
Received call from patient requesting refill(s) of traMADol (ULTRAM) 50 MG tablet    Last dispensed from pharmacy on 10/28/2020    Pt last seen by prescribing provider on 10/26/2020  Next appt scheduled for None      checked in the past 6 months? Yes If no, print current report and give to RN    Last urine drug screen date None   Current opioid agreement on file (completed within the last year) No Date of opioid agreement: None     E-prescribe to [facility] [place], [place]  pharmacy    Will route to nursing [place] for review and preparation of prescription(s).

## 2020-11-27 NOTE — TELEPHONE ENCOUNTER
Informed patient that their refill of traMADol (ULTRAM) 50 MG tablet was E-scribed to the pharmacy. Patient was informed of fill and start date.    Darcy Ness CMA  [facility]  [place]

## 2020-11-27 NOTE — TELEPHONE ENCOUNTER
Medication refill information reviewed.     Due date for traMADol (ULTRAM) 50 MG tablet is 11/28/20     Prescriptions prepped for review.     Will route to provider.     Glenn Hook, RN  Care Coordinator   [facility] [place]

## 2020-11-29 ENCOUNTER — MYC MEDICAL ADVICE (OUTPATIENT)
Dept: PALLIATIVE MEDICINE | Facility: CLINIC | Age: 40
End: 2020-11-29

## 2020-11-29 ENCOUNTER — HEALTH MAINTENANCE LETTER (OUTPATIENT)
Age: 40
End: 2020-11-29

## 2020-12-02 NOTE — TELEPHONE ENCOUNTER
Per patient myChart message:  From: Nicole Lynnlisa      Created: 11/29/2020 1:48 PM      I increased my lyrica to 2 caps at night, I am unable to tolerate the 2 caps during the day. Since starting the celebrex, increasing the lyrica and taking the Tinazidine at night (on days I work) my pain had been overall tolerable. 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 I can barely move my neck and the pain is back to being excruciating. I feel like I'm at square one, and do not have a schedule where I even have time to rest and recover. Im not sure if I should make an appt with Dr bess and start PT? Or if he would be willing to prescribe a stronger pain medication of small quantity for the next wk or so? Please get back to me as soon as possible.  Thank you, Nicole      _____________________  Mychart sent to pt:  From: Olinda He RN      Created: 12/2/2020 1:58 PM      Mark Rivera,  I'm going to have you schedule a virtual visit with Dr. Bess.    At this time he has availability on Monday.  You can call [phone] to schedule that.         Will keep encounter open for a couple of days in anticipation of patient call back.    /SYMONE Prakash-BSN  [facility]

## 2020-12-04 ENCOUNTER — TELEPHONE (OUTPATIENT)
Dept: NEUROSURGERY | Facility: CLINIC | Age: 40
End: 2020-12-04

## 2020-12-04 NOTE — TELEPHONE ENCOUNTER
[facility] Call Center    Phone Message    May a detailed message be left on voicemail: yes     Reason for Call: Appointment Intake    Referring Provider Name: Dr. Arias  Diagnosis and/or Symptoms: Cervical radiculopathy: referred specifically to [facility]. No notes in referral indicating time frame/virtual vs face to face/imaging needed. Please review and call pt back to schedule    Action Taken: Message routed to:  [facility] nadia simms    Travel Screening: Not Applicable

## 2020-12-09 NOTE — TELEPHONE ENCOUNTER
12/9/20:  John Arias MD  [facility] Pain Nurse      Multiple no shows please contact about policy prior to resched

## 2020-12-09 NOTE — TELEPHONE ENCOUNTER
From: Glenn Hook RN      Created: 12/9/2020 11:53 AM        We are reaching out due to multiple no show Appt's.  It is important that you attend your appt's on a regular basis or call and cancel if needed as there are many people waiting to get into the clinic.        We would like to maintain a good working relationship.  Are there any barriers that are getting in the way of you making your Appt's?        Providers have the digression to not see pt's after 3 no shows.      Please call if you have any questions.      Glenn Hook RN   Care Coordinator   [facility] [place]

## 2020-12-13 ENCOUNTER — PRE VISIT (OUTPATIENT)
Dept: NEUROSURGERY | Facility: CLINIC | Age: 40
End: 2020-12-13

## 2020-12-13 NOTE — TELEPHONE ENCOUNTER
FUTURE VISIT INFORMATION      FUTURE VISIT INFORMATION:    Date: 12/14/2020    Time: 130pm    [place] [facility] [place] INFORMATION:    Referring provider:  Dr. Arias    Referring providers clinic:  [facility]     Reason for visit/diagnosis  Cervical Radiculopathy     RECORDS REQUESTED FROM:       Clinic name Comments Records Status Imaging Status   Internal Dr. Arias-10/26/2020, 7/27/2020    [facility]-5/7/2020    [facility] Cervical Spine -5/15/2020, 5/20/2020 Epic PACS

## 2020-12-14 ENCOUNTER — MYC REFILL (OUTPATIENT)
Dept: PALLIATIVE MEDICINE | Facility: CLINIC | Age: 40
End: 2020-12-14

## 2020-12-14 ENCOUNTER — VIRTUAL VISIT (OUTPATIENT)
Dept: NEUROSURGERY | Facility: CLINIC | Age: 40
End: 2020-12-14
Payer: COMMERCIAL

## 2020-12-14 DIAGNOSIS — M54.12 CERVICAL RADICULOPATHY: ICD-10-CM

## 2020-12-14 DIAGNOSIS — G95.0 SYRINX OF SPINAL CORD (H): ICD-10-CM

## 2020-12-14 DIAGNOSIS — M54.2 CERVICALGIA: Primary | ICD-10-CM

## 2020-12-14 DIAGNOSIS — G43.109 MIGRAINE WITH AURA AND WITHOUT STATUS MIGRAINOSUS, NOT INTRACTABLE: ICD-10-CM

## 2020-12-14 DIAGNOSIS — M79.18 MYOFASCIAL MUSCLE PAIN: ICD-10-CM

## 2020-12-14 PROCEDURE — 99204 OFFICE O/P NEW MOD 45 MIN: CPT | Mod: GT | Performed by: NEUROLOGICAL SURGERY

## 2020-12-14 RX ORDER — RIZATRIPTAN BENZOATE 10 MG/1
10 TABLET ORAL
Qty: 10 TABLET | Refills: 1 | Status: CANCELLED | OUTPATIENT
Start: 2020-12-14

## 2020-12-14 RX ORDER — BUTALBITAL, ACETAMINOPHEN AND CAFFEINE 50; 325; 40 MG/1; MG/1; MG/1
1 TABLET ORAL DAILY PRN
Qty: 10 TABLET | Refills: 0 | Status: CANCELLED | OUTPATIENT
Start: 2020-12-14

## 2020-12-14 RX ORDER — PREGABALIN 25 MG/1
25 CAPSULE ORAL 2 TIMES DAILY
Qty: 60 CAPSULE | Refills: 1 | Status: CANCELLED | OUTPATIENT
Start: 2020-12-14
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
## 2020-12-15 ENCOUNTER — MYC MEDICAL ADVICE (OUTPATIENT)
Dept: PALLIATIVE MEDICINE | Facility: CLINIC | Age: 40
End: 2020-12-15

## 2020-12-15 DIAGNOSIS — M54.12 CERVICAL RADICULOPATHY: ICD-10-CM

## 2020-12-15 RX ORDER — BUTALBITAL, ACETAMINOPHEN AND CAFFEINE 50; 325; 40 MG/1; MG/1; MG/1
1 TABLET ORAL DAILY PRN
Qty: 10 TABLET | Refills: 0 | Status: SHIPPED | OUTPATIENT
Start: 2020-12-15 | End: 2021-01-15

## 2020-12-15 RX ORDER — PREGABALIN 25 MG/1
25 CAPSULE ORAL 2 TIMES DAILY
Qty: 60 CAPSULE | Refills: 1 | Status: SHIPPED | OUTPATIENT
Start: 2020-12-15 | End: 2021-02-16

## 2020-12-15 RX ORDER — RIZATRIPTAN BENZOATE 10 MG/1
10 TABLET ORAL
Qty: 10 TABLET | Refills: 1 | Status: SHIPPED | OUTPATIENT
Start: 2020-12-15 | End: 2021-05-20

## 2020-12-15 NOTE — TELEPHONE ENCOUNTER
Pending Prescriptions:                       Disp   Refills    pregabalin (LYRICA) 25 MG capsule         60 cap*1            Sig: Take 1 capsule (25 mg) by mouth 2 times daily    rizatriptan (MAXALT) 10 MG tablet         10 tab*1            Sig: Take 1 tablet (10 mg) by mouth at onset of           headache for migraine (OK to retake one tablet           two hours after first if not relief)    butalbital-acetaminophen-caffeine (ESGIC)*10 tab*0            Sig: Take 1 tablet by mouth daily as needed for           headaches    Lyrica 25 mg last refill 11/19/2020  Rizatriptan 10 mg last refill 10/28/2020  butalbital-acetaminophen-caffeine last refill 10/22/2020    Last office visit 12/09/2020  Next appointment:  None     Pieter Glasgow MA

## 2020-12-18 ENCOUNTER — TELEPHONE (OUTPATIENT)
Dept: NEUROSURGERY | Facility: CLINIC | Age: 40
End: 2020-12-18

## 2020-12-18 DIAGNOSIS — Z01.818 PREOPERATIVE EVALUATION TO RULE OUT SURGICAL CONTRAINDICATION: Primary | ICD-10-CM

## 2020-12-18 DIAGNOSIS — M54.2 CERVICALGIA: Primary | ICD-10-CM

## 2020-12-18 DIAGNOSIS — M54.12 CERVICAL RADICULOPATHY: ICD-10-CM

## 2020-12-18 RX ORDER — TRAMADOL HYDROCHLORIDE 50 MG/1
25-50 TABLET ORAL DAILY PRN
Qty: 10 TABLET | Refills: 0 | Status: CANCELLED | OUTPATIENT
Start: 2020-12-18

## 2020-12-18 NOTE — TELEPHONE ENCOUNTER
From: Nicole Phoenix      Created: 12/18/2020 12:07 PM        [place] is about [place] 45 minute [place] from my house which makes it difficult          From: Fanny Montoya, RN      Created: 12/18/2020 12:19 PM        Ok Nicole, Which [employer]?     SHAQUILLE GaleanoN, RN  Care Coordinator  [facility] [place]

## 2020-12-18 NOTE — TELEPHONE ENCOUNTER
From: Nicole Phoenix      Created: 12/15/2020 5:08 AM        I did call and cancel the same day, I made the appt with Dr. Hook as I figured I would reschedule after I spoke with him regarding the spinal stimulator implant. I am sorry about the miscommunication.   Nicole

## 2020-12-18 NOTE — TELEPHONE ENCOUNTER
From: Nicole Phoenix      Created: 12/15/2020 11:19 AM        I never had a chance to  my prescription for tramadol at [facility], is there any way to cancel that one and send it to [facility]?  Nicole          From: Fanny Montoya, RN      Created: 12/18/2020 12:05 PM        Yeyo Rivera, The prescription was transferred to [facility] when the [place] pharmacy closed. Are you able to pick it up there?     YESSENIA Galeano, RN  Care Coordinator  [facility] [place]

## 2020-12-21 ENCOUNTER — MYC MEDICAL ADVICE (OUTPATIENT)
Dept: PALLIATIVE MEDICINE | Facility: CLINIC | Age: 40
End: 2020-12-21

## 2020-12-21 RX ORDER — TRAMADOL HYDROCHLORIDE 50 MG/1
25-50 TABLET ORAL DAILY PRN
Qty: 10 TABLET | Refills: 0 | Status: SHIPPED | OUTPATIENT
Start: 2020-12-21 | End: 2021-01-15

## 2020-12-21 NOTE — TELEPHONE ENCOUNTER
The prescription for Tramadol that was sent in on 11/27/20 was never filled by the patient and her [facility] pharmacy has since closed. She is requesting the prescription be sent to [facility] pharmacy in [place]. Patient no showed her 12/9/20 appointment and has not rescheduled. Routing to Dr Arias to review request.    Fanny Montoya, SHAQUILLEN, RN  Care Coordinator  [facility] [place]

## 2020-12-22 NOTE — TELEPHONE ENCOUNTER
Per patient Jeyhart message:  From: Nicole Phoenix      Created: 12/21/2020 6:58 PM          I will be going through [facility] tomorrow so I will pick it, after that I will be going through [facility], that's where I had my other prescriptions sent this last request.  Thank you, Nicole

## 2020-12-22 NOTE — TELEPHONE ENCOUNTER
See the 12/15/20 mychart encounter for more information.    Olinda He RN-BSN  [facility]

## 2020-12-31 DIAGNOSIS — M79.18 MYOFASCIAL MUSCLE PAIN: ICD-10-CM

## 2020-12-31 RX ORDER — CELECOXIB 100 MG/1
100 CAPSULE ORAL 2 TIMES DAILY
Qty: 60 CAPSULE | Refills: 1 | Status: SHIPPED | OUTPATIENT
Start: 2020-12-31 | End: 2021-03-19

## 2020-12-31 NOTE — TELEPHONE ENCOUNTER
Received fax request from     [id number] - [place], [place] - [address] [place] [id number]  Phone: [phone] Fax: [phone]    pharmacy requesting refill(s) for celecoxib (CELEBREX) 100 MG capsule    Last refilled on 12/30/20    Pt last seen on 12/09/20    No future appointments scheduled at this time    Will facilitate refill.    Darcy Ness [facility]  [place]

## 2021-01-13 ENCOUNTER — TELEPHONE (OUTPATIENT)
Dept: NEUROSURGERY | Facility: CLINIC | Age: 41
End: 2021-01-13

## 2021-01-15 ENCOUNTER — TELEPHONE (OUTPATIENT)
Dept: NEUROSURGERY | Facility: CLINIC | Age: 41
End: 2021-01-15

## 2021-01-15 ENCOUNTER — MYC REFILL (OUTPATIENT)
Dept: PALLIATIVE MEDICINE | Facility: CLINIC | Age: 41
End: 2021-01-15

## 2021-01-15 ENCOUNTER — MYC MEDICAL ADVICE (OUTPATIENT)
Dept: PALLIATIVE MEDICINE | Facility: CLINIC | Age: 41
End: 2021-01-15

## 2021-01-15 DIAGNOSIS — M54.12 CERVICAL RADICULOPATHY: ICD-10-CM

## 2021-01-15 DIAGNOSIS — M79.18 MYOFASCIAL MUSCLE PAIN: ICD-10-CM

## 2021-01-15 DIAGNOSIS — G43.109 MIGRAINE WITH AURA AND WITHOUT STATUS MIGRAINOSUS, NOT INTRACTABLE: ICD-10-CM

## 2021-01-15 RX ORDER — TRAMADOL HYDROCHLORIDE 50 MG/1
25-50 TABLET ORAL DAILY PRN
Qty: 10 TABLET | Refills: 0 | Status: CANCELLED | OUTPATIENT
Start: 2021-01-15

## 2021-01-15 RX ORDER — BUTALBITAL, ACETAMINOPHEN AND CAFFEINE 50; 325; 40 MG/1; MG/1; MG/1
1 TABLET ORAL DAILY PRN
Qty: 10 TABLET | Refills: 0 | Status: SHIPPED | OUTPATIENT
Start: 2021-01-15 | End: 2021-02-23

## 2021-01-15 RX ORDER — BUTALBITAL, ACETAMINOPHEN AND CAFFEINE 50; 325; 40 MG/1; MG/1; MG/1
1 TABLET ORAL DAILY PRN
Qty: 10 TABLET | Refills: 0 | Status: CANCELLED | OUTPATIENT
Start: 2021-01-15

## 2021-01-15 RX ORDER — TRAMADOL HYDROCHLORIDE 50 MG/1
25-50 TABLET ORAL DAILY PRN
Qty: 10 TABLET | Refills: 0 | Status: SHIPPED | OUTPATIENT
Start: 2021-01-15 | End: 2021-05-03

## 2021-01-15 NOTE — TELEPHONE ENCOUNTER
Refills have been requested for the following medications:         butalbital-acetaminophen-caffeine (ESGIC) -40 MG tablet [John Arias MD]         traMADol (ULTRAM) 50 MG tablet [John Arias MD]     Preferred pharmacy: [id number] [address]

## 2021-01-15 NOTE — TELEPHONE ENCOUNTER
Medication refill information reviewed.     Due date for butalbital-acetaminophen-caffeine (ESGIC) -40 MG tablet and traMADol (ULTRAM) 50 MG tablet is 1/21/21    Dr. Arias, do you want Pt to have CSA and UDS or are you not concerned due to quantity?     Prescriptions prepped for review.     Will route to provider.     lGenn Hook, RN  Care Coordinator   [facility] [place]

## 2021-01-15 NOTE — TELEPHONE ENCOUNTER
Received call from patient requesting refill(s) of butalbital-acetaminophen-caffeine (ESGIC) -40 MG tablet and traMADol (ULTRAM) 50 MG tablet    Last dispensed from pharmacy on 12/30/20-Esgic                            12/21/20 qty 7 tablets for 7 day supply-Tramadol    Patient's last office/virtual visit by prescribing provider on 12/09/20  Next office/virtual appointment scheduled for : none    Last urine drug screen date None  Current opioid agreement on file (completed within the last year) No Date of opioid agreement: None    E-prescribe to pharmacy-[id number] - [place], [place] - [facility] [address]     Will route to nursing [place] for review and preparation of prescription(s).

## 2021-01-15 NOTE — TELEPHONE ENCOUNTER
Received request from patient requesting refill(s) for tiZANidine (ZANAFLEX) 4 MG tablet    Last refilled on 12/17/20    Pt last seen on 12/09/20  Next appt scheduled for : none    Will facilitate refill.

## 2021-01-15 NOTE — TELEPHONE ENCOUNTER
Mary message from patient on 1/15 at 1106:    I am unable to send a refill request for my tinazidine, can you please send a request to Dr Arias  Thank you  ----------    Will route to MA pool for assistance with gathering refill information. May send directly to provider if no issues identified.     Marii Arroyo, SHAQUILLEN, RN-BC  Patient Care Supervisor/Care Coordinator  [facility] [place]

## 2021-01-18 ENCOUNTER — OFFICE VISIT (OUTPATIENT)
Dept: OBGYN | Facility: CLINIC | Age: 41
End: 2021-01-18
Payer: COMMERCIAL

## 2021-01-18 ENCOUNTER — TELEPHONE (OUTPATIENT)
Dept: FAMILY MEDICINE | Facility: CLINIC | Age: 41
End: 2021-01-18

## 2021-01-18 VITALS
HEART RATE: 88 BPM | WEIGHT: 132 LBS | TEMPERATURE: 99.6 F | SYSTOLIC BLOOD PRESSURE: 106 MMHG | OXYGEN SATURATION: 99 % | BODY MASS INDEX: 24.14 KG/M2 | DIASTOLIC BLOOD PRESSURE: 60 MMHG

## 2021-01-18 DIAGNOSIS — Z32.00 PREGNANCY EXAMINATION OR TEST, PREGNANCY UNCONFIRMED: ICD-10-CM

## 2021-01-18 DIAGNOSIS — N93.9 ABNORMAL UTERINE BLEEDING (AUB): Primary | ICD-10-CM

## 2021-01-18 DIAGNOSIS — Z80.3 FAMILY HISTORY OF MALIGNANT NEOPLASM OF BREAST: ICD-10-CM

## 2021-01-18 PROCEDURE — 88305 TISSUE EXAM BY PATHOLOGIST: CPT | Performed by: PATHOLOGY

## 2021-01-18 PROCEDURE — 99204 OFFICE O/P NEW MOD 45 MIN: CPT | Mod: 25 | Performed by: OBSTETRICS & GYNECOLOGY

## 2021-01-18 PROCEDURE — 58100 BIOPSY OF UTERUS LINING: CPT | Performed by: OBSTETRICS & GYNECOLOGY

## 2021-01-18 NOTE — PROGRESS NOTES
Clinic Note    CC:    Chief Complaint   Patient presents with     Consult      nexplanon      HPI:  Nicole Phoenix is a 40 year old  woman with history of prior sterilization who presents for evaluation of heavy menstrual bleeding.     Periods irregular though monthly over the past 6 months. 4d vaginal bleeding, very heavy bleeding with flooding.     Has tried Depo with prolonged bleeding. Has always had heavy bleeding and cramps, now with increased menstrual migraines, palpitations, fatigue, all timed to cycles.     Smoker though trying to quit.     Ob Hx:  s/p CS x2 as well as term IUFD  Gyn Hx: Patient's last menstrual period was 2020.  Menses as above   Last pap NIL, HPV- ()   Last mammogram never, ordered today     PMH:   Past Medical History:   Diagnosis Date     Cervical high risk HPV (human papillomavirus) test positive 16    Neg      Cervicalgia 10/18/2016     Neck muscle spasm 10/18/2016     NO ACTIVE PROBLEMS      Syncope, unspecified syncope type 10/18/2016     SurgHx:   Past Surgical History:   Procedure Laterality Date     C  DELIVERY+POSTPARTUM CARE       HC REMOVAL OF TONSILS,<13 Y/O       HC REPAIR EXTEN TENDON FINGER W/O GRAFT, EACH  03/15/2009    right ring finger     TUBAL LIGATION       FamHx:   Family History   Problem Relation Age of Onset     Breast Cancer Maternal Grandmother 45     Alcohol/Drug Maternal Grandfather         recovering alcoholic     Diabetes Paternal Grandmother      Cerebrovascular Disease Paternal Grandmother      SocHx:   Social History     Socioeconomic History     Marital status:      Spouse name: None     Number of children: None     Years of education: None     Highest education level: None   Occupational History     None   Social Needs     Financial resource strain: None     Food insecurity     Worry: None     Inability: None     Transportation needs     Medical: None     Non-medical: None   Tobacco Use     Smoking  status: Current Some Day Smoker     Packs/day: 0.10     Years: 4.00     Pack years: 0.40     Types: Cigarettes     Smokeless tobacco: Never Used   Substance and Sexual Activity     Alcohol use: No     Drug use: No     Sexual activity: Yes     Partners: Male     Birth control/protection: Surgical     Comment: tubal   Lifestyle     Physical activity     Days per week: None     Minutes per session: None     Stress: None   Relationships     Social connections     Talks on phone: None     Gets together: None     Attends Zoroastrianism service: None     Active member of club or organization: None     Attends meetings of clubs or organizations: None     Relationship status: None     Intimate partner violence     Fear of current or ex partner: None     Emotionally abused: None     Physically abused: None     Forced sexual activity: None   Other Topics Concern      Service Yes     Comment: [employer] [id number]  Never deployed outside of the [place]     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 Not Asked   Social History Narrative     and lives at home with her  and daughter.  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             Allergies:   Parafon forte dsc [chlorzoxazone] and Poison ivy extract [extract of poison ivy]  Current Medications:   Current Outpatient Medications   Medication Sig Dispense Refill     butalbital-acetaminophen-caffeine (ESGIC) -40 MG tablet Take 1 tablet by mouth daily as needed for headaches 10 tablet 0      ondansetron (ZOFRAN) 4 MG tablet Take 1 tablet (4 mg) by mouth every 8 hours as needed for nausea 60 tablet 0     pregabalin (LYRICA) 25 MG capsule Take 1 capsule (25 mg) by mouth 2 times daily 60 capsule 1     rizatriptan (MAXALT) 10 MG tablet Take 1 tablet (10 mg) by mouth at onset of headache for migraine (OK to retake one tablet two hours after first if not relief) 10 tablet 1     tiZANidine (ZANAFLEX) 4 MG tablet Take 1 tablet (4 mg) by mouth 3 times daily as needed for muscle spasms 90 tablet 1     traMADol (ULTRAM) 50 MG tablet Take 0.5-1 tablets (25-50 mg) by mouth daily as needed for severe pain Ok to fill 1/18/21 and start 1/20/21, To last 1 month 10 tablet 0     venlafaxine (EFFEXOR-XR) 75 MG 24 hr capsule Take 3 capsules (225 mg) by mouth daily 270 capsule 0     celecoxib (CELEBREX) 100 MG capsule Take 1 capsule (100 mg) by mouth 2 times daily (Patient not taking: Reported on 1/18/2021) 60 capsule 1     ROS: 10 point ROS negative other than as noted in the HPI    EXAM:  Vitals:    01/18/21 0935   BP: 106/60   BP Location: Right arm   Patient Position: Chair   Cuff Size: Adult Regular   Pulse: 88   Temp: 99.6  F (37.6  C)   TempSrc: Temporal   SpO2: 99%   Weight: 59.9 kg (132 lb)    Body mass index is 24.14 kg/m .    Gen: Alert, oriented, appropriately interactive, NAD  CV: RRR, no murmurs, no extra heart sounds, 2+ peripheral pulses  Resp: CTAB, good effort without distress   Breasts: no axillary adenopathy, no dominant masses, no skin changes, no nipple discharge, nontender  Abdomen: soft, non tender, non distended, no masses, no hernias. No inguinal lymphadenopathy.   Perineum: no lesions; normal appearing external genitalia, bartholins glands, urethra, skenes glands  Vagina: no masses or lesions or discharge, normally rugated.  Cervix: no masses or lesions or discharge   Anus: appears normal  Bimanual exam:   Nontender pelvic floor muscles  Uterus: midline, anteverted, small, mobile  no masses,  "non-tender  Adnexa: no masses or tenderness appreciated   No cervical motion tenderness  MSK: normal gait, symmetric movements UE & LE  Lower extremities: non-tender, no edema    Labs & Imaging:  No results found for this or any previous visit (from the past 24 hour(s)).    Lab Results   Component Value Date    PAP NIL 2018    PAP NIL 2016    PAP NIL 08/10/2005     ASSESSMENT/PLAN: Nicole Phoenix is a 40 year old  with historically very heavy cycle, some irregular spacing, and multiple menstrual symptoms, not controlled with hormonal suppression. She is s/p prior sterilization and desires definitve management.    Abnormal uterine bleeding (AUB)  - Reviewed risks of hysterectomy including risk of bleeding with possible need for blood transfusion, surgical site infection or wound healing complications, injury to surrounding structures including bladder, ureters, or bowel, with possible need for corrective procedures by general surgery or urology, possible need for extended hospitalization. We discussed anticipated post operative recovery including pelvic rest and no heavy lifting for six weeks. Reviewed risks postop of hernias as well as risk of vaginal cuff dehiscence. The patient understands and wished to proceed.    - ENDOMETRIAL BIOPSY W/O CERVICAL DILATION  - US Pelvic Complete with Transvaginal; Future  - Case Request: HYSTERECTOMY, TOTAL, LAPAROSCOPIC, WITH BILATERAL SALPINGECTOMY; Standing  - Case Request: HYSTERECTOMY, TOTAL, LAPAROSCOPIC, WITH BILATERAL SALPINGECTOMY  - Surgical pathology exam    Family history of malignant neoplasm of breast  - Normal breast exam today  - MGM with BC at 44yo  - *MA Screening Digital Bilateral; Future  - CANCER RISK MGMT/CANCER GENETIC COUNSELING REFERRAL    Ivan Beasley MD   2021 9:59 AM      Endometrial Biopsy                                                                       Time Out - \"Pause for the Cause\"  Just before the procedure " begins, through verbal and active participation of team members, verify:    Initials   Patient Name    JCB   Patient Date of Birth JCB   Procedure to be performed  JCB   Implants, special equipment or special requirements        JCB     Consent:  Written consent signed and scanned into medical record.    Indication: menometrorhagia    Using a Gladys speculum, the cervix was visualized. The cervix was prepped with Betadine.  A single tooth tenaculum was applied to the anterior lip of the cervix. The endometrial pipelle was advanced through the cervix without difficulty and a sample collected. The tenaculum was removed from the cervix and the tenaculum site hemostatic.    EBL: minimal   Complications: none  Pathology: EMB sample was sent to pathology.  Tolerance of Procedure:  Patient tolerated the procedure well.     Patient was instructed to call if she experiences any heavy bleeding, severe cramping, or abnormal vaginal discharge.  May take ibuprofen 400-800 mg PO TID PRN for cramping.    Will notify patient of results.    Ivan Beasley MD  OB/GYN  January 18, 2021, 2:47 PM

## 2021-01-18 NOTE — TELEPHONE ENCOUNTER
Type of surgery: YSTERECTOMY, TOTAL, LAPAROSCOPIC, WITH BILATERAL SALPINGECTOMY (N/A)   Location of surgery: [facility] OR  Date and time of surgery: 3/24/21  Surgeon: Gale  Pre-Op Appt Date: 3/9  Post-Op Appt Date: 4/7 Bhanu   Packet sent out: Yes  Pre-cert/Authorization completed:  Not Applicable  Date: na    She will get covid test done at her work- [employer]. Gave fax # for results.

## 2021-01-19 ENCOUNTER — TRANSCRIBE ORDERS (OUTPATIENT)
Dept: FAMILY MEDICINE | Facility: OTHER | Age: 41
End: 2021-01-19

## 2021-01-19 ENCOUNTER — TELEPHONE (OUTPATIENT)
Dept: FAMILY MEDICINE | Facility: OTHER | Age: 41
End: 2021-01-19

## 2021-01-19 NOTE — LETTER
"    January 19, 2021       TO: Nicole ALIRIO Alban  [address] [place] [address]         Dear Ms. Phoenix,    Our records indicate that you have not scheduled an appointment for a consult, as recommended by Dr. Ivan Beasley. If you wish to schedule within [facility]alth, we have several options to help you schedule your appointment:      Call [phone]    Visit our website at [email or web address] and click \"I Want To\" (in upper right) and select Request an Appointment    Request an appointment via [employer] at [email or web address]     If you have chosen to schedule elsewhere or if you have already made an appointment, please disregard this letter.    If you have any questions or concerns regarding the information above, please contact the [facility] MICHELLE at [phone].      Sincerely,      [facility]                  "

## 2021-01-19 NOTE — TELEPHONE ENCOUNTER
Oncology/Surgical Oncology Referral Request:     Specialty Requested: Medical Oncology     Referring Provider: Ivan Beasley MD    Referring Clinic/Organization: [facility]    Records location: [facility]     Requested Provider (if specified): Not Specified       Called pt x 2, LVM, Sending letter.

## 2021-01-20 ENCOUNTER — MYC MEDICAL ADVICE (OUTPATIENT)
Dept: OBGYN | Facility: CLINIC | Age: 41
End: 2021-01-20

## 2021-01-20 LAB — COPATH REPORT: NORMAL

## 2021-01-29 ENCOUNTER — TELEPHONE (OUTPATIENT)
Dept: OBGYN | Facility: CLINIC | Age: 41
End: 2021-01-29

## 2021-01-29 NOTE — TELEPHONE ENCOUNTER
FMLA forms received via fax.  Forms filled out, faxed, and copy sent to scanning.      Pamela Aragon, [facility]  January 29, 2021

## 2021-02-03 ENCOUNTER — MYC MEDICAL ADVICE (OUTPATIENT)
Dept: FAMILY MEDICINE | Facility: CLINIC | Age: 41
End: 2021-02-03

## 2021-02-03 DIAGNOSIS — R31.9 HEMATURIA, UNSPECIFIED TYPE: Primary | ICD-10-CM

## 2021-02-09 ENCOUNTER — MYC MEDICAL ADVICE (OUTPATIENT)
Dept: FAMILY MEDICINE | Facility: CLINIC | Age: 41
End: 2021-02-09

## 2021-02-09 DIAGNOSIS — M79.18 MYOFASCIAL MUSCLE PAIN: ICD-10-CM

## 2021-02-09 DIAGNOSIS — G43.109 MIGRAINE WITH AURA AND WITHOUT STATUS MIGRAINOSUS, NOT INTRACTABLE: ICD-10-CM

## 2021-02-09 DIAGNOSIS — F33.2 SEVERE EPISODE OF RECURRENT MAJOR DEPRESSIVE DISORDER, WITHOUT PSYCHOTIC FEATURES (H): ICD-10-CM

## 2021-02-10 ENCOUNTER — TELEPHONE (OUTPATIENT)
Dept: UROLOGY | Facility: CLINIC | Age: 41
End: 2021-02-10

## 2021-02-10 RX ORDER — BUTALBITAL, ACETAMINOPHEN AND CAFFEINE 50; 325; 40 MG/1; MG/1; MG/1
1 TABLET ORAL DAILY PRN
Qty: 10 TABLET | Refills: 0 | Status: CANCELLED | OUTPATIENT
Start: 2021-02-10

## 2021-02-10 RX ORDER — VENLAFAXINE HYDROCHLORIDE 75 MG/1
225 CAPSULE, EXTENDED RELEASE ORAL DAILY
Qty: 270 CAPSULE | Refills: 0 | Status: CANCELLED | OUTPATIENT
Start: 2021-02-10

## 2021-02-10 NOTE — TELEPHONE ENCOUNTER
[facility] Call Center    Phone Message    May a detailed message be left on voicemail: yes     Reason for Call: Other: Pt has a referral for Hematuria, unspecified type.  [employer] is to send a TE if unspecified type.  Please follow up with the Pt for scheduling.  Thank you.      Action Taken: Message routed to:  [facility]  uro    Travel Screening: Not Applicable

## 2021-02-10 NOTE — TELEPHONE ENCOUNTER
Per chart review patient has seen Urology in [place].   Left generic voicemail for patient to return call to clinic. When call is returned will discuss if patient would like an appointment with [facility].    Marie Rodriguez LPN

## 2021-02-11 NOTE — TELEPHONE ENCOUNTER
Patient is scheduled for a virtual visit today at 3:40.  She is informed via [employer].  Closing this encounter.  YESSENIA Calzada, RN

## 2021-02-12 NOTE — TELEPHONE ENCOUNTER
Left second voicemail for patient to return call to clinic.  Multiple attempts to reach patient have been made, closing encounter at this time.    Marie Rodriguez LPN

## 2021-02-15 ENCOUNTER — MYC MEDICAL ADVICE (OUTPATIENT)
Dept: FAMILY MEDICINE | Facility: CLINIC | Age: 41
End: 2021-02-15

## 2021-02-15 ENCOUNTER — MYC MEDICAL ADVICE (OUTPATIENT)
Dept: PALLIATIVE MEDICINE | Facility: CLINIC | Age: 41
End: 2021-02-15

## 2021-02-15 DIAGNOSIS — M54.12 CERVICAL RADICULOPATHY: ICD-10-CM

## 2021-02-15 NOTE — TELEPHONE ENCOUNTER
Called patient to schedule follow up, mailbox is full and unable to leave voicemail         Stacy Cortes    [facility]     Patient informed to call Pain Management.  Closing this encounter.  SHAQUILLE CalzadaN, RN       monthly or less

## 2021-02-15 NOTE — TELEPHONE ENCOUNTER
Per patient Jarvam message:  From: Nicole Phoenix      Created: 2/15/2021 2:36 PM      Can you please have someone call me as soon as possible to set up an appt with you. I am due for my Lyrica refill and I am unable to request the med refills on my chart. I only have one day left, I've been trying to reach th appt desk but it states to make an appt through [facility].   Thank you, Nicole      ______________    School[employer] sent to pt:  From: Olinda He RN      Created: 2/15/2021 2:53 PM        Mark Rivera,  Are you using our [phone]? We don't schedule appointments via [facility] so I am surprised that it advised you to do that.     I have sent this on to our schedulers to give you a call.        Routed to  to call pt to schedule virtual visit with Dr. Arias.    Olinda, RN-BSN  [facility]

## 2021-02-15 NOTE — TELEPHONE ENCOUNTER
Called patient to schedule follow up, mailbox is full and unable to leave voicemail       Stacy Cortes    [facility]

## 2021-02-15 NOTE — TELEPHONE ENCOUNTER
Routed to MA pool to process lyrica refill.      Per patient myChart message:  From: Nicole Phoenix      Created: 2/15/2021 4:04 PM      I was using the main line and it was stating that phone lines were at high volumes and to use the my chart. I did make an appt for Mon the 22nd. Is there anyway I could get a week of lyrica to get me until the appt?  Sorry for the mix up  Thank you Nicole

## 2021-02-16 ENCOUNTER — MYC MEDICAL ADVICE (OUTPATIENT)
Dept: OBGYN | Facility: CLINIC | Age: 41
End: 2021-02-16

## 2021-02-16 RX ORDER — PREGABALIN 25 MG/1
25 CAPSULE ORAL 2 TIMES DAILY
Qty: 60 CAPSULE | Refills: 1 | Status: SHIPPED | OUTPATIENT
Start: 2021-02-16 | End: 2021-03-11

## 2021-02-16 NOTE — LETTER
[address] [address]  [phone]          February 24, 2021    Nicole Phoenix                                                                                                                     [address] [facility] [address]          To whom it may concern,    The above patient is under my professional medical care.  Please allow her to be off of work from Monday, 3/22, through Friday, 5/2.        Sincerely,     Ivan Beasley MD

## 2021-02-16 NOTE — TELEPHONE ENCOUNTER
Ivan Beasley MD  You 17 minutes ago (8:40 AM)     If she wants to quarantine, then that is fine, though she does not need to.

## 2021-02-16 NOTE — TELEPHONE ENCOUNTER
Pending Prescriptions:                       Disp   Refills    pregabalin (LYRICA) 25 MG capsule         60 cap*1            Sig: Take 1 capsule (25 mg) by mouth 2 times daily    Last refill 1/22/2021  Last refill 12/09/2020  Next appointment 02/22/21    Pieter Glasgow MA

## 2021-02-16 NOTE — TELEPHONE ENCOUNTER
Called pt and LVM stating that prescription for Lyrica was sent to [id number] - [place], [place] - [address] [place] [address]     date-Today     Pieter Glasgow MA

## 2021-02-18 ENCOUNTER — MYC MEDICAL ADVICE (OUTPATIENT)
Dept: PALLIATIVE MEDICINE | Facility: CLINIC | Age: 41
End: 2021-02-18

## 2021-02-22 ENCOUNTER — MYC REFILL (OUTPATIENT)
Dept: PALLIATIVE MEDICINE | Facility: CLINIC | Age: 41
End: 2021-02-22

## 2021-02-22 ENCOUNTER — VIRTUAL VISIT (OUTPATIENT)
Dept: PALLIATIVE MEDICINE | Facility: CLINIC | Age: 41
End: 2021-02-22
Payer: COMMERCIAL

## 2021-02-22 DIAGNOSIS — M79.18 MYOFASCIAL MUSCLE PAIN: ICD-10-CM

## 2021-02-22 DIAGNOSIS — M54.12 CERVICAL RADICULOPATHY: ICD-10-CM

## 2021-02-22 DIAGNOSIS — G43.109 MIGRAINE WITH AURA AND WITHOUT STATUS MIGRAINOSUS, NOT INTRACTABLE: Primary | ICD-10-CM

## 2021-02-22 DIAGNOSIS — G43.109 MIGRAINE WITH AURA AND WITHOUT STATUS MIGRAINOSUS, NOT INTRACTABLE: ICD-10-CM

## 2021-02-22 DIAGNOSIS — M47.812 CERVICAL SPONDYLOSIS WITHOUT MYELOPATHY: ICD-10-CM

## 2021-02-22 PROCEDURE — 99214 OFFICE O/P EST MOD 30 MIN: CPT | Mod: TEL | Performed by: PAIN MEDICINE

## 2021-02-22 RX ORDER — BUTALBITAL, ACETAMINOPHEN AND CAFFEINE 50; 325; 40 MG/1; MG/1; MG/1
1 TABLET ORAL DAILY PRN
Qty: 10 TABLET | Refills: 0 | Status: CANCELLED | OUTPATIENT
Start: 2021-02-22

## 2021-02-22 RX ORDER — TRAMADOL HYDROCHLORIDE 50 MG/1
25-50 TABLET ORAL DAILY PRN
Qty: 10 TABLET | Refills: 0 | Status: CANCELLED | OUTPATIENT
Start: 2021-02-22

## 2021-02-22 ASSESSMENT — PAIN SCALES - GENERAL: PAINLEVEL: MILD PAIN (3)

## 2021-02-22 NOTE — PATIENT INSTRUCTIONS
----------------------------------------------------------------  [facility] Number:  [phone]     Call with any questions about your care and for scheduling assistance.     Calls are returned Monday through Friday between 8 AM and 4:30 PM. We usually get back to you within 2 business days depending on the issue/request.    If we are prescribing your medications:    For opioid medication refills, call the clinic or send a [employer] message 7 days in advance.  Please include:    Name of requested medication    Name of the pharmacy.    For non-opioid medications, call your pharmacy directly to request a refill. Please allow 3-4 days to be processed.     Per [place] State Law:    All controlled substance prescriptions must be filled within 30 days of being written.      For those controlled substances allowing refills, pickup must occur within 30 days of last fill.      We believe regular attendance is key to your success in our program!      Any time you are unable to keep your appointment we ask that you call us at least 24 hours in advance to cancel.This will allow us to offer the appointment time to another patient.     Multiple missed appointments may lead to dismissal from the clinic.

## 2021-02-22 NOTE — PROGRESS NOTES
Nicole Phoenix is a 40 year old female who is being evaluated via a billable telephone visit.      [facility]      Chief Complaint:    Chief Complaint   Patient presents with     Pain     Video visit due to COVID-19      MME prescribed prior to seeing patient:5  Current MME:  rec  - Further procedures recommended:    -Consider repeat cervical epidural steroid injection  - Medication Management:    -Short course of tramadol provided   -Norflex 100 mg twice daily  - Physical Therapy: We will strongly consider pain PT  - Clinical Health Psychologist to address issues of relaxation, behavioral change, coping style, and other factors important to improvement: Strongly consider  - Diagnostic Studies: no  - Urine toxicology screen today: None at this time any further prescriptions will need to be performed  - Follow up:    2 to 3 months  Interval     10/20 izabella reports benefit in her radiating symptoms.  Recent ED visit for flank pain neg w.u. received short rx for tramadol  Lyrica 25mg am 50mg at night   The pt reports sig improvement in sleep   Benefit with Maxalt  Benefit with Fioricet  Takes zanaflex with benefit   Pt has been tracking her migraine  The pt cont to have some neck pain.  Pain is worse with extension and rotation.  Pain is worse with prolonged work.  Pain is worse with lifting.  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  Patient also has to do lifting.     Overall her symptoms are more reasonable   In terms of headaches      HA >15 month>4hrs-24 hours  Hard to focus  Blurry vision/ neg aura  Nausea /neg vomiting  + phono/photo  deneis rhino/tearing  Neg weakness  Denies any triggers  Benefit with fiorecet.  Benefit with Maxalt if she takes early enough    Has been on multiple medications for her headaches in the past.  Pain history: Of note patient no showed her first appointment      Nicole Phoenix is a 40 year old female who first started having problems with pain acute on  chronic  Left>right-sided neck pain radiating to her upper extremity. The pain started 10yrs ago. No inciting event. The pain has gotten worse over the last 6 months no inciting event. The pt works as a care giver.   The pain radiates all the way into her hand right >Left.  The pain is variable in nature.  The pain varies from sharp shooting pain to a deep dull aching pain.  The pain is also spasming in nature.    Pain is worse with extension.  The pain is worse with rotation.  The pain is worse with lifting.  The pain is worse when bringing her arm above her head.  The pain is worse with working.    The patient notes benefit with massage in the past.  Patient notes some benefit with PT in the past.  Patient has had benefit with epidural steroid injections in the past. The pt recently had an epidural that helped for approx 2 wks.       Of note the patient feels she has had difficulty with her grasp.  Right greater than left.    Gabapentin  Did not tolerate, the pt felt extremely tired. Could not focus at work    The patient is currently being seen by neurosurgery.    The patient has symptoms significantly affect her quality of life.  Pain rating: intensity  Averages 8/10 on a 0-10 scale.      Current treatments include:  Effexor  maxalt  lyrica- 25mg -50mg  Fioricet  Tramadol  Previous medication treatments included:  Tramadol  zanaflex- but was only on 2 mg, flexerl,, norflex  meloxicam x    nausea  Gabapentin - lyrica- (only take 25mg makes her tired)  mobic  topamax   Other treatments have included:  Nicole Lynntitusjosué has been seen at a pain clinic in the past.  [facility]  PT: yes  Chiropractic: n  Acupuncture: n  TENs Unit: n  Injections:   GERTRUDIS benefit in the past last in June 2 wks benefit.   C CIPRIANO 2/20 significant other radiating symptoms but he continues to have significant headaches  Past Medical History:  Past Medical History:   Diagnosis Date     Cervical high risk HPV (human papillomavirus) test positive 11/2/16     Neg      Cervicalgia 10/18/2016     Neck muscle spasm 10/18/2016     NO ACTIVE PROBLEMS      Syncope, unspecified syncope type 10/18/2016     Past Surgical History:  Past Surgical History:   Procedure Laterality Date     C  DELIVERY+POSTPARTUM CARE       HC REMOVAL OF TONSILS,<11 Y/O       HC REPAIR EXTEN TENDON FINGER W/O GRAFT, EACH  03/15/2009    right ring finger     TUBAL LIGATION       Medications:  Current Outpatient Medications   Medication Sig Dispense Refill     ondansetron (ZOFRAN) 4 MG tablet Take 1 tablet (4 mg) by mouth every 8 hours as needed for nausea 60 tablet 0     pregabalin (LYRICA) 25 MG capsule Take 1 capsule (25 mg) by mouth 2 times daily 60 capsule 1     rizatriptan (MAXALT) 10 MG tablet Take 1 tablet (10 mg) by mouth at onset of headache for migraine (OK to retake one tablet two hours after first if not relief) 10 tablet 1     tiZANidine (ZANAFLEX) 4 MG tablet Take 1 tablet (4 mg) by mouth 3 times daily as needed for muscle spasms 90 tablet 1     traMADol (ULTRAM) 50 MG tablet Take 0.5-1 tablets (25-50 mg) by mouth daily as needed for severe pain Ok to fill 21 and start 21, To last 1 month 10 tablet 0     venlafaxine (EFFEXOR-XR) 75 MG 24 hr capsule Take 3 capsules (225 mg) by mouth daily 90 capsule 0     butalbital-acetaminophen-caffeine (ESGIC) -40 MG tablet Take 1 tablet by mouth daily as needed for headaches 10 tablet 0     celecoxib (CELEBREX) 100 MG capsule Take 1 capsule (100 mg) by mouth 2 times daily (Patient not taking: Reported on 2021) 60 capsule 1     Allergies:     Allergies   Allergen Reactions     Parafon Forte Dsc [Chlorzoxazone] Hives     Poison Ivy Extract [Extract Of Poison Ivy]      Social History:  Home situation: fam  Occupation/Schooling: y      Family history:  Family History   Problem Relation Age of Onset     Breast Cancer Maternal Grandmother 45     Alcohol/Drug Maternal Grandfather         recovering alcoholic      Diabetes Paternal Grandmother      Cerebrovascular Disease Paternal Grandmother      Family history of headaches: n    Review of Systems:  Skin: negative  Eyes: negative  Ears/Nose/Throat: negative  Respiratory: No shortness of breath, dyspnea on exertion, cough, or hemoptysis  Cardiovascular: negative  Gastrointestinal: negative  Genitourinary: negative  Musculoskeletal: negative  Neurologic: negative  Psychiatric: negative  Hematologic/Lymphatic/Immunologic: negative  Endocrine: negative    Physical Exam:  There were no vitals filed for this visit.  Exam:  No acute distress speaking full sentences  Diagnostic tests:                                                                     IMPRESSION:  1. No abnormal contrast enhancement in the cervical spinal cord  associated with the presumed syrinx described on the noncontrast  images acquired 5/15/2020.  2. Rim-enhancing multiloculated/lobulated collection in the sublingual  and submandibular spaces on the left may represent a ranula. Soft  tissue neck CT would be helpful for further characterization.           Assessment/Plan:  Nicole Phoenix is a 40 year old female who presents with the complaints of acute on chronic bilateral neck pain left greater than right radiating to her upper extremity right greater than left.   Nicole was seen today for pain.    Diagnoses and all orders for this visit:    Migraine with aura and without status migrainosus, not intractable    Cervical radiculopathy    Cervical spondylosis without myelopathy         - Further procedures recommended:    -Consider cervical MBB/RFA T ON, C3, C4   -Consider Botox (patient meets criteria based on symptoms and medications tried in the past)  - Medication Management:      -Continue Lyrica   -Continue Maxalt for abortive therapy   -Continue Fioricet for abortive therapy   -Discussed concept of CGRP antagonist/inhibitors   -Zofran as needed  - Physical Therapy: We will strongly consider pain PT  -  Clinical Health Psychologist to address issues of relaxation, behavioral change, coping style, and other factors important to improvement: Strongly consider  Urine toxicology-none at this time  - Follow up:    3 months     -Would recommend continued weight restriction (15 pounds)       Total time spent was 25 minutes,     John Arias MD  [facility]  This note was created with voice recognition software, and while reviewed for accuracy, typos may remain.

## 2021-02-23 ENCOUNTER — TELEPHONE (OUTPATIENT)
Dept: PALLIATIVE MEDICINE | Facility: CLINIC | Age: 41
End: 2021-02-23

## 2021-02-23 RX ORDER — BUTALBITAL, ACETAMINOPHEN AND CAFFEINE 50; 325; 40 MG/1; MG/1; MG/1
1 TABLET ORAL DAILY PRN
Qty: 10 TABLET | Refills: 0 | Status: SHIPPED | OUTPATIENT
Start: 2021-02-23 | End: 2021-03-16

## 2021-02-23 NOTE — TELEPHONE ENCOUNTER
Prior Authorization Retail Medication Request    Medication/Dose: traMADol (ULTRAM) 50 MG tablet  ICD code (if different than what is on RX):    Previously Tried and Failed:    Rationale:      Insurance Name:  [employer]  Insurance ID:  [id number]    KEY: [id number]    Pharmacy Information (if different than what is on RX)    [id number] - [place], [place] - [address] [place] [id number]  Phone: [phone] Fax: [phone]    Will route to SNEHA Garner.      SVETLANA Lassiter [facility] [place]

## 2021-02-23 NOTE — TELEPHONE ENCOUNTER
Sent [employer] message, notified that prescription was sent to preferred pharmacy.      Larissa Larkin MA  [facility] [place]

## 2021-02-23 NOTE — TELEPHONE ENCOUNTER
Received call from patient  requesting refill(s) for butalbital-acetaminophen-caffeine (ESGIC) -40 MG tablet     Last refilled on 01/15/21    Pt last seen on 02/22/21  Next appt scheduled for None    E-prescribe to:     [id number] - [place], [place] - [address] [place] [address]     Will facilitate refill.    --------------    Pharmacy staff stated that patient has prescription for traMADol (ULTRAM) 50 MG tablet available from 01/15/21.      Larissa Larkin MA  [facility] [place]

## 2021-02-23 NOTE — TELEPHONE ENCOUNTER
Refills have been requested for the following medications:         traMADol (ULTRAM) 50 MG tablet [Jhon Arias MD]         butalbital-acetaminophen-caffeine (ESGIC) -40 MG tablet [John Arias MD]     Preferred pharmacy: [id number] [address]

## 2021-02-24 NOTE — TELEPHONE ENCOUNTER
Typed up letter for pt to be off for a few day prior to her surgery to quarantine after her Covid test until 6 weeks post op.      Will flag for clinic RN to print letter off and fax to [phone].    Emi Armstrong RN

## 2021-02-25 NOTE — TELEPHONE ENCOUNTER
Prior Authorization Approval    Authorization Effective Date: 2/25/2021  Authorization Expiration Date: 2/25/2022  Medication: traMADol (ULTRAM) 50 MG tablet  Approved Dose/Quantity:    Reference #:     Insurance Company: [employer] - Phone [phone] Fax [phone]  Expected CoPay:       CoPay Card Available:      Foundation Assistance Needed:    Which Pharmacy is filling the prescription (Not needed for infusion/clinic administered): [id number] - [place], [place] - [address] [place] [address]  Pharmacy Notified: Yes  Patient Notified: Yes  **Instructed pharmacy to notify patient when script is ready to /ship.**

## 2021-02-25 NOTE — TELEPHONE ENCOUNTER
Central Prior Authorization Team   Phone: [phone]      PA Initiation    Medication: traMADol (ULTRAM) 50 MG tablet  Insurance Company: [employer] - Phone [phone] Fax [phone]  Pharmacy Filling the Rx: [id number] - [place], [place] - [address] [place] [address]  Filling Pharmacy Phone: [phone]  Filling Pharmacy Fax:    Start Date: 2/25/2021

## 2021-03-06 DIAGNOSIS — Z11.59 ENCOUNTER FOR SCREENING FOR OTHER VIRAL DISEASES: ICD-10-CM

## 2021-03-10 ENCOUNTER — MYC MEDICAL ADVICE (OUTPATIENT)
Dept: PALLIATIVE MEDICINE | Facility: CLINIC | Age: 41
End: 2021-03-10

## 2021-03-10 DIAGNOSIS — M54.12 CERVICAL RADICULOPATHY: ICD-10-CM

## 2021-03-11 RX ORDER — PREGABALIN 25 MG/1
25 CAPSULE ORAL 3 TIMES DAILY
Qty: 90 CAPSULE | Refills: 1 | Status: SHIPPED | OUTPATIENT
Start: 2021-03-11 | End: 2021-05-13

## 2021-03-11 NOTE — TELEPHONE ENCOUNTER
Mary sent to pt:  From: Nicole Phoenix      Created: 3/10/2021 3:34 PM      My last lyrica was only filled for 60 caps and i take 3 a day so I am short. Can you send another 30 to my pharmacy please.  Nicole        New script prepped for Dr. Arias to review and sign.    Olinda RN-BSN  [facility]

## 2021-03-16 ENCOUNTER — HOSPITAL ENCOUNTER (OUTPATIENT)
Dept: ULTRASOUND IMAGING | Facility: CLINIC | Age: 41
End: 2021-03-16
Attending: OBSTETRICS & GYNECOLOGY
Payer: COMMERCIAL

## 2021-03-16 ENCOUNTER — HOSPITAL ENCOUNTER (OUTPATIENT)
Dept: CT IMAGING | Facility: CLINIC | Age: 41
End: 2021-03-16
Attending: NEUROLOGICAL SURGERY
Payer: COMMERCIAL

## 2021-03-16 DIAGNOSIS — N93.9 ABNORMAL UTERINE BLEEDING (AUB): ICD-10-CM

## 2021-03-16 DIAGNOSIS — G43.109 MIGRAINE WITH AURA AND WITHOUT STATUS MIGRAINOSUS, NOT INTRACTABLE: ICD-10-CM

## 2021-03-16 DIAGNOSIS — M54.2 CERVICALGIA: ICD-10-CM

## 2021-03-16 DIAGNOSIS — M54.12 CERVICAL RADICULOPATHY: ICD-10-CM

## 2021-03-16 DIAGNOSIS — M79.18 MYOFASCIAL MUSCLE PAIN: ICD-10-CM

## 2021-03-16 PROCEDURE — 250N000011 HC RX IP 250 OP 636: Performed by: NEUROLOGICAL SURGERY

## 2021-03-16 PROCEDURE — 70491 CT SOFT TISSUE NECK W/DYE: CPT

## 2021-03-16 PROCEDURE — 76830 TRANSVAGINAL US NON-OB: CPT

## 2021-03-16 PROCEDURE — 250N000009 HC RX 250: Performed by: NEUROLOGICAL SURGERY

## 2021-03-16 RX ORDER — IOPAMIDOL 755 MG/ML
500 INJECTION, SOLUTION INTRAVASCULAR ONCE
Status: COMPLETED | OUTPATIENT
Start: 2021-03-16 | End: 2021-03-16

## 2021-03-16 RX ORDER — BUTALBITAL, ACETAMINOPHEN AND CAFFEINE 50; 325; 40 MG/1; MG/1; MG/1
1 TABLET ORAL DAILY PRN
Qty: 10 TABLET | Refills: 0 | Status: SHIPPED | OUTPATIENT
Start: 2021-03-16 | End: 2021-04-14

## 2021-03-16 RX ADMIN — IOPAMIDOL 80 ML: 755 INJECTION, SOLUTION INTRAVENOUS at 15:50

## 2021-03-16 RX ADMIN — SODIUM CHLORIDE 70 ML: 9 INJECTION, SOLUTION INTRAVENOUS at 15:50

## 2021-03-16 NOTE — TELEPHONE ENCOUNTER
Received fax from pharmacy requesting refill(s) for butalbital-acetaminophen-caffeine (ESGIC) -40 MG tablet     Last refilled on 02/23/21    Pt last seen on 02/22/21  Next appt scheduled for None    E-prescribe to:     [id number] - [place] - [address] [place] [address]       Will facilitate refill.      Larissa Larkin MA  [facility] [place]

## 2021-03-19 ENCOUNTER — OFFICE VISIT (OUTPATIENT)
Dept: FAMILY MEDICINE | Facility: CLINIC | Age: 41
End: 2021-03-19
Payer: COMMERCIAL

## 2021-03-19 VITALS
RESPIRATION RATE: 16 BRPM | HEART RATE: 76 BPM | WEIGHT: 128.8 LBS | TEMPERATURE: 98.2 F | BODY MASS INDEX: 23.7 KG/M2 | HEIGHT: 62 IN | DIASTOLIC BLOOD PRESSURE: 60 MMHG | OXYGEN SATURATION: 99 % | SYSTOLIC BLOOD PRESSURE: 114 MMHG

## 2021-03-19 DIAGNOSIS — N93.9 ABNORMAL UTERINE BLEEDING (AUB): ICD-10-CM

## 2021-03-19 DIAGNOSIS — F33.1 MODERATE RECURRENT MAJOR DEPRESSION (H): ICD-10-CM

## 2021-03-19 DIAGNOSIS — K13.79 MUCOCELE OF MOUTH: ICD-10-CM

## 2021-03-19 DIAGNOSIS — Z01.818 PREOP GENERAL PHYSICAL EXAM: Primary | ICD-10-CM

## 2021-03-19 LAB
ALBUMIN SERPL-MCNC: 4.1 G/DL (ref 3.4–5)
ALBUMIN UR-MCNC: NEGATIVE MG/DL
ALP SERPL-CCNC: 69 U/L (ref 40–150)
ALT SERPL W P-5'-P-CCNC: 27 U/L (ref 0–50)
ANION GAP SERPL CALCULATED.3IONS-SCNC: 5 MMOL/L (ref 3–14)
APPEARANCE UR: ABNORMAL
AST SERPL W P-5'-P-CCNC: 8 U/L (ref 0–45)
BACTERIA #/AREA URNS HPF: ABNORMAL /HPF
BASOPHILS # BLD AUTO: 0.1 10E9/L (ref 0–0.2)
BASOPHILS NFR BLD AUTO: 0.9 %
BILIRUB SERPL-MCNC: 0.4 MG/DL (ref 0.2–1.3)
BILIRUB UR QL STRIP: NEGATIVE
BUN SERPL-MCNC: 15 MG/DL (ref 7–30)
CALCIUM SERPL-MCNC: 9.1 MG/DL (ref 8.5–10.1)
CHLORIDE SERPL-SCNC: 104 MMOL/L (ref 94–109)
CO2 SERPL-SCNC: 28 MMOL/L (ref 20–32)
COLOR UR AUTO: ABNORMAL
CREAT SERPL-MCNC: 0.68 MG/DL (ref 0.52–1.04)
DIFFERENTIAL METHOD BLD: NORMAL
EOSINOPHIL # BLD AUTO: 0.2 10E9/L (ref 0–0.7)
EOSINOPHIL NFR BLD AUTO: 2.7 %
ERYTHROCYTE [DISTWIDTH] IN BLOOD BY AUTOMATED COUNT: 12.3 % (ref 10–15)
GFR SERPL CREATININE-BSD FRML MDRD: >90 ML/MIN/{1.73_M2}
GLUCOSE SERPL-MCNC: 88 MG/DL (ref 70–99)
GLUCOSE UR STRIP-MCNC: NEGATIVE MG/DL
HCT VFR BLD AUTO: 41.7 % (ref 35–47)
HGB BLD-MCNC: 14 G/DL (ref 11.7–15.7)
HGB UR QL STRIP: ABNORMAL
IMM GRANULOCYTES # BLD: 0 10E9/L (ref 0–0.4)
IMM GRANULOCYTES NFR BLD: 0.2 %
KETONES UR STRIP-MCNC: NEGATIVE MG/DL
LEUKOCYTE ESTERASE UR QL STRIP: NEGATIVE
LYMPHOCYTES # BLD AUTO: 2.7 10E9/L (ref 0.8–5.3)
LYMPHOCYTES NFR BLD AUTO: 31 %
MCH RBC QN AUTO: 31.8 PG (ref 26.5–33)
MCHC RBC AUTO-ENTMCNC: 33.6 G/DL (ref 31.5–36.5)
MCV RBC AUTO: 95 FL (ref 78–100)
MONOCYTES # BLD AUTO: 0.7 10E9/L (ref 0–1.3)
MONOCYTES NFR BLD AUTO: 8 %
NEUTROPHILS # BLD AUTO: 5 10E9/L (ref 1.6–8.3)
NEUTROPHILS NFR BLD AUTO: 57.2 %
NITRATE UR QL: NEGATIVE
NRBC # BLD AUTO: 0 10*3/UL
NRBC BLD AUTO-RTO: 0 /100
PH UR STRIP: 6 PH (ref 5–7)
PLATELET # BLD AUTO: 287 10E9/L (ref 150–450)
POTASSIUM SERPL-SCNC: 3.9 MMOL/L (ref 3.4–5.3)
PROT SERPL-MCNC: 7.7 G/DL (ref 6.8–8.8)
RBC # BLD AUTO: 4.4 10E12/L (ref 3.8–5.2)
RBC #/AREA URNS AUTO: 1 /HPF (ref 0–2)
SODIUM SERPL-SCNC: 137 MMOL/L (ref 133–144)
SOURCE: ABNORMAL
SP GR UR STRIP: 1 (ref 1–1.03)
SQUAMOUS #/AREA URNS AUTO: 6 /HPF (ref 0–1)
UROBILINOGEN UR STRIP-MCNC: 0 MG/DL (ref 0–2)
WBC # BLD AUTO: 8.8 10E9/L (ref 4–11)
WBC #/AREA URNS AUTO: 2 /HPF (ref 0–5)

## 2021-03-19 PROCEDURE — 81001 URINALYSIS AUTO W/SCOPE: CPT | Performed by: PHYSICIAN ASSISTANT

## 2021-03-19 PROCEDURE — 36415 COLL VENOUS BLD VENIPUNCTURE: CPT | Performed by: PHYSICIAN ASSISTANT

## 2021-03-19 PROCEDURE — 93000 ELECTROCARDIOGRAM COMPLETE: CPT | Performed by: PHYSICIAN ASSISTANT

## 2021-03-19 PROCEDURE — 99214 OFFICE O/P EST MOD 30 MIN: CPT | Performed by: PHYSICIAN ASSISTANT

## 2021-03-19 PROCEDURE — 80053 COMPREHEN METABOLIC PANEL: CPT | Performed by: PHYSICIAN ASSISTANT

## 2021-03-19 PROCEDURE — 85025 COMPLETE CBC W/AUTO DIFF WBC: CPT | Performed by: PHYSICIAN ASSISTANT

## 2021-03-19 ASSESSMENT — PATIENT HEALTH QUESTIONNAIRE - PHQ9: SUM OF ALL RESPONSES TO PHQ QUESTIONS 1-9: 8

## 2021-03-19 ASSESSMENT — PAIN SCALES - GENERAL: PAINLEVEL: NO PAIN (0)

## 2021-03-19 ASSESSMENT — MIFFLIN-ST. JEOR: SCORE: 1206.45

## 2021-03-19 NOTE — H&P (VIEW-ONLY)
[address] [address]  Phone: [phone]  Fax: [phone]  Primary Provider: Bolivar Olivarez  Pre-op Performing Provider: BOLIVAR OLIVAREZ    PREOPERATIVE EVALUATION:  Today's date: 3/19/2021    Nicole Phoenix is a 41 year old female who presents for a preoperative evaluation.    Surgical Information:  Surgery/Procedure: HYSTERECTOMY, TOTAL, LAPAROSCOPIC, WITH BILATERAL SALPINGECTOMY  Surgery Location: [facility]  Surgeon: Dr. Beasley  Surgery Date: 3-  Time of Surgery: 7:30 am  Where patient plans to recover: At home with family  Fax number for surgical facility: Note does not need to be faxed, will be available electronically in Epic.    Type of Anesthesia Anticipated: General    Assessment & Plan     The proposed surgical procedure is considered INTERMEDIATE risk.    Preop general physical exam  Abnormal uterine bleeding (AUB)  Moderate recurrent major depression  Patient has met with gynecology and discussed the risks/benefits of the upcoming procedure. She was instructed on proper prep for the procedure including holding medications, fasting, cessation of nsaids/smoking/alcohol and showering. Patient would like to try to taper off of ssri medications following the recovery of her hysterectomy. This can be addressed at follow up this summer.   - EKG 12-lead complete w/read - Clinics  - CBC with platelets and differential  - Comprehensive metabolic panel (BMP + Alb, Alk Phos, ALT, AST, Total. Bili, TP)  - *UA reflex to Microscopic and Culture ([place]; [facility]; [facility]; [facility]; [facility]; [facility]; [place]; [place])    Mucocele of mouth  Recent CT scan of head/neck identified suspected mucocele or similar cyst in the submental fat. There was a recommendation for aspiration of the lesion. Discussed with patient meeting with ENT to further explore this option or other possible  treatments. No personal or family history of cancer. Denies constitutional symptoms. Referral placed, patient will schedule at her convenience.   - OTOLARYNGOLOGY REFERRAL    Risks and Recommendations:  The patient has the following additional risks and recommendations for perioperative complications:   - No identified additional risk factors other than previously addressed    Medication Instructions:   - pregabalin, gabapentin: Continue without modification.   - SSRIs, SNRIs, TCAs, Antipsychotics: Continue without modification.     RECOMMENDATION:  APPROVAL GIVEN to proceed with proposed procedure, without further diagnostic evaluation.    Subjective     HPI related to upcoming procedure: HYSTERECTOMY, TOTAL, LAPAROSCOPIC, WITH BILATERAL SALPINGECTOMY  Preop Questions 3/19/2021   1. Have you ever had a heart attack or stroke? No   2. Have you ever had surgery on your heart or blood vessels, such as a stent placement, a coronary artery bypass, or surgery on an artery in your head, neck, heart, or legs? No   3. Do you have chest pain with activity? No   4. Do you have a history of  heart failure? No   5. Do you currently have a cold, bronchitis or symptoms of other infection? No   6. Do you have a cough, shortness of breath, or wheezing? No   7. Do you or anyone in your family have previous history of blood clots? No   8. Do you or does anyone in your family have a serious bleeding problem such as prolonged bleeding following surgeries or cuts? No   9. Have you ever had problems with anemia or been told to take iron pills? No   10. Have you had any abnormal blood loss such as black, tarry or bloody stools, or abnormal vaginal bleeding? No   11. Have you ever had a blood transfusion? No   12. Are you willing to have a blood transfusion if it is medically needed before, during, or after your surgery? Yes   13. Have you or any of your relatives ever had problems with anesthesia? No   14. Do you have sleep apnea,  excessive snoring or daytime drowsiness? YES - daytime drowsiness   14a. Do you have a CPAP machine? No   15. Do you have any artifical heart valves or other implanted medical devices like a pacemaker, defibrillator, or continuous glucose monitor? No   16. Do you have artificial joints? No   17. Are you allergic to latex? No   18. Is there any chance that you may be pregnant? No       Health Care Directive:  Patient does not have a Health Care Directive or Living Will: Discussed advance care planning with patient; information given to patient to review.    Preoperative Review of :   reviewed - controlled substances reflected in medication list.      Status of Chronic Conditions:  See problem list for active medical problems.  Problems all longstanding and stable, except as noted/documented.  See ROS for pertinent symptoms related to these conditions.      Review of Systems  Constitutional, neuro, ENT, endocrine, pulmonary, cardiac, gastrointestinal, genitourinary, musculoskeletal, integument and psychiatric systems are negative, except as otherwise noted.    Patient Active Problem List    Diagnosis Date Noted     Abnormal uterine bleeding (AUB) 01/18/2021     Priority: Medium     Added automatically from request for surgery [id number]       Moderate recurrent major depression (H), with anxious distress 04/05/2018     Priority: Medium     Contact dermatitis due to poison ivy 05/31/2017     Priority: Medium     Cervical high risk HPV (human papillomavirus) test positive 11/02/2016     Priority: Medium     11/2/16: NIL Pap, + HR HPV (Neg 16/18). Plan cotest in 1 year.   4/5/18 NIL pap, neg HR HPV. Plan: cotest in 3 years.       Syncope, unspecified syncope type 10/18/2016     Priority: Medium     Neck muscle spasm 10/18/2016     Priority: Medium     Cervicalgia 10/18/2016     Priority: Medium     Fibromyalgia 06/27/2014     Priority: Medium     Intractable chronic migraine without aura and without status migrainosus  2014     Priority: Medium     Problem list name updated by automated process. Provider to review        Past Medical History:   Diagnosis Date     Cervical high risk HPV (human papillomavirus) test positive 16    Neg      Cervicalgia 10/18/2016     Neck muscle spasm 10/18/2016     NO ACTIVE PROBLEMS      Syncope, unspecified syncope type 10/18/2016     Past Surgical History:   Procedure Laterality Date     C  DELIVERY+POSTPARTUM CARE       HC REMOVAL OF TONSILS,<11 Y/O       HC REPAIR EXTEN TENDON FINGER W/O GRAFT, EACH  03/15/2009    right ring finger     TUBAL LIGATION       Current Outpatient Medications   Medication Sig Dispense Refill     butalbital-acetaminophen-caffeine (ESGIC) -40 MG tablet Take 1 tablet by mouth daily as needed for headaches 10 tablet 0     pregabalin (LYRICA) 25 MG capsule Take 1 capsule (25 mg) by mouth 3 times daily 90 capsule 1     rizatriptan (MAXALT) 10 MG tablet Take 1 tablet (10 mg) by mouth at onset of headache for migraine (OK to retake one tablet two hours after first if not relief) 10 tablet 1     tiZANidine (ZANAFLEX) 4 MG tablet Take 1 tablet (4 mg) by mouth 3 times daily as needed for muscle spasms 90 tablet 1     traMADol (ULTRAM) 50 MG tablet Take 0.5-1 tablets (25-50 mg) by mouth daily as needed for severe pain Ok to fill 21 and start 21, To last 1 month 10 tablet 0     venlafaxine (EFFEXOR-XR) 75 MG 24 hr capsule Take 3 capsules (225 mg) by mouth daily 90 capsule 0     ondansetron (ZOFRAN) 4 MG tablet Take 1 tablet (4 mg) by mouth every 8 hours as needed for nausea (Patient not taking: Reported on 3/19/2021) 60 tablet 0       Allergies   Allergen Reactions     Parafon Forte Dsc [Chlorzoxazone] Hives     Poison Ivy Extract [Extract Of Poison Martha]         Social History     Tobacco Use     Smoking status: Current Some Day Smoker     Packs/day: 0.10     Years: 4.00     Pack years: 0.40     Types: Cigarettes     Smokeless tobacco:  "Never Used   Substance Use Topics     Alcohol use: No       History   Drug Use No         Objective     /60 (BP Location: Right arm, Patient Position: Chair, Cuff Size: Adult Regular)   Pulse 76   Temp 98.2  F (36.8  C) (Temporal)   Resp 16   Ht 1.581 m (5' 2.25\")   Wt 58.4 kg (128 lb 12.8 oz)   LMP 03/10/2021   SpO2 99%   BMI 23.37 kg/m      Physical Exam    GENERAL APPEARANCE: healthy, alert and no distress     EYES: EOMI, PERRL     HENT: ear canals and TM's normal and nose and mouth without ulcers or lesions     NECK: no adenopathy, no asymmetry, masses, or scars and thyroid normal to palpation     RESP: lungs clear to auscultation - no rales, rhonchi or wheezes     CV: regular rates and rhythm, normal S1 S2, no S3 or S4 and no murmur, click or rub     ABDOMEN:  soft, nontender, no HSM or masses and bowel sounds normal     MS: extremities normal- no gross deformities noted, no evidence of inflammation in joints, FROM in all extremities.     NEURO: Normal strength and tone, sensory exam grossly normal, mentation intact and speech normal     PSYCH: mentation appears normal. and affect normal/bright     LYMPHATICS: No cervical adenopathy    Recent Labs   Lab Test 01/27/20  1052   HGB 13.4         POTASSIUM 4.2   CR 0.88        Diagnostics:  No results found for this or any previous visit (from the past 24 hour(s)).   EKG: appears normal, NSR, normal axis, normal intervals, no acute ST/T changes c/w ischemia, no LVH by voltage criteria, unchanged from previous tracings     Revised Cardiac Risk Index (RCRI):  The patient has the following serious cardiovascular risks for perioperative complications:   - No serious cardiac risks = 0 points     RCRI Interpretation: 0 points: Class I (very low risk - 0.4% complication rate)           Signed Electronically by: Win Reyna PA-C  Copy of this evaluation report is provided to requesting physician.      "

## 2021-03-19 NOTE — PATIENT INSTRUCTIONS

## 2021-03-19 NOTE — PROGRESS NOTES
[address] [address]  Phone: [phone]  Fax: [phone]  Primary Provider: Bolivar Olivarez  Pre-op Performing Provider: BOLIVAR OLIVAREZ    PREOPERATIVE EVALUATION:  Today's date: 3/19/2021    Nicole Phoenix is a 41 year old female who presents for a preoperative evaluation.    Surgical Information:  Surgery/Procedure: HYSTERECTOMY, TOTAL, LAPAROSCOPIC, WITH BILATERAL SALPINGECTOMY  Surgery Location: [facility]  Surgeon: Dr. Beasley  Surgery Date: 3-  Time of Surgery: 7:30 am  Where patient plans to recover: At home with family  Fax number for surgical facility: Note does not need to be faxed, will be available electronically in Epic.    Type of Anesthesia Anticipated: General    Assessment & Plan     The proposed surgical procedure is considered INTERMEDIATE risk.    Preop general physical exam  Abnormal uterine bleeding (AUB)  Moderate recurrent major depression  Patient has met with gynecology and discussed the risks/benefits of the upcoming procedure. She was instructed on proper prep for the procedure including holding medications, fasting, cessation of nsaids/smoking/alcohol and showering. Patient would like to try to taper off of ssri medications following the recovery of her hysterectomy. This can be addressed at follow up this summer.   - EKG 12-lead complete w/read - Clinics  - CBC with platelets and differential  - Comprehensive metabolic panel (BMP + Alb, Alk Phos, ALT, AST, Total. Bili, TP)  - *UA reflex to Microscopic and Culture ([place]; [facility]; [facility]; [facility]; [facility]; [facility]; [place]; [place])    Mucocele of mouth  Recent CT scan of head/neck identified suspected mucocele or similar cyst in the submental fat. There was a recommendation for aspiration of the lesion. Discussed with patient meeting with ENT to further explore this option or other possible  treatments. No personal or family history of cancer. Denies constitutional symptoms. Referral placed, patient will schedule at her convenience.   - OTOLARYNGOLOGY REFERRAL    Risks and Recommendations:  The patient has the following additional risks and recommendations for perioperative complications:   - No identified additional risk factors other than previously addressed    Medication Instructions:   - pregabalin, gabapentin: Continue without modification.   - SSRIs, SNRIs, TCAs, Antipsychotics: Continue without modification.     RECOMMENDATION:  APPROVAL GIVEN to proceed with proposed procedure, without further diagnostic evaluation.    Subjective     HPI related to upcoming procedure: HYSTERECTOMY, TOTAL, LAPAROSCOPIC, WITH BILATERAL SALPINGECTOMY  Preop Questions 3/19/2021   1. Have you ever had a heart attack or stroke? No   2. Have you ever had surgery on your heart or blood vessels, such as a stent placement, a coronary artery bypass, or surgery on an artery in your head, neck, heart, or legs? No   3. Do you have chest pain with activity? No   4. Do you have a history of  heart failure? No   5. Do you currently have a cold, bronchitis or symptoms of other infection? No   6. Do you have a cough, shortness of breath, or wheezing? No   7. Do you or anyone in your family have previous history of blood clots? No   8. Do you or does anyone in your family have a serious bleeding problem such as prolonged bleeding following surgeries or cuts? No   9. Have you ever had problems with anemia or been told to take iron pills? No   10. Have you had any abnormal blood loss such as black, tarry or bloody stools, or abnormal vaginal bleeding? No   11. Have you ever had a blood transfusion? No   12. Are you willing to have a blood transfusion if it is medically needed before, during, or after your surgery? Yes   13. Have you or any of your relatives ever had problems with anesthesia? No   14. Do you have sleep apnea,  excessive snoring or daytime drowsiness? YES - daytime drowsiness   14a. Do you have a CPAP machine? No   15. Do you have any artifical heart valves or other implanted medical devices like a pacemaker, defibrillator, or continuous glucose monitor? No   16. Do you have artificial joints? No   17. Are you allergic to latex? No   18. Is there any chance that you may be pregnant? No       Health Care Directive:  Patient does not have a Health Care Directive or Living Will: Discussed advance care planning with patient; information given to patient to review.    Preoperative Review of :   reviewed - controlled substances reflected in medication list.      Status of Chronic Conditions:  See problem list for active medical problems.  Problems all longstanding and stable, except as noted/documented.  See ROS for pertinent symptoms related to these conditions.      Review of Systems  Constitutional, neuro, ENT, endocrine, pulmonary, cardiac, gastrointestinal, genitourinary, musculoskeletal, integument and psychiatric systems are negative, except as otherwise noted.    Patient Active Problem List    Diagnosis Date Noted     Abnormal uterine bleeding (AUB) 01/18/2021     Priority: Medium     Added automatically from request for surgery [id number]       Moderate recurrent major depression (H), with anxious distress 04/05/2018     Priority: Medium     Contact dermatitis due to poison ivy 05/31/2017     Priority: Medium     Cervical high risk HPV (human papillomavirus) test positive 11/02/2016     Priority: Medium     11/2/16: NIL Pap, + HR HPV (Neg 16/18). Plan cotest in 1 year.   4/5/18 NIL pap, neg HR HPV. Plan: cotest in 3 years.       Syncope, unspecified syncope type 10/18/2016     Priority: Medium     Neck muscle spasm 10/18/2016     Priority: Medium     Cervicalgia 10/18/2016     Priority: Medium     Fibromyalgia 06/27/2014     Priority: Medium     Intractable chronic migraine without aura and without status migrainosus  2014     Priority: Medium     Problem list name updated by automated process. Provider to review        Past Medical History:   Diagnosis Date     Cervical high risk HPV (human papillomavirus) test positive 16    Neg      Cervicalgia 10/18/2016     Neck muscle spasm 10/18/2016     NO ACTIVE PROBLEMS      Syncope, unspecified syncope type 10/18/2016     Past Surgical History:   Procedure Laterality Date     C  DELIVERY+POSTPARTUM CARE       HC REMOVAL OF TONSILS,<11 Y/O       HC REPAIR EXTEN TENDON FINGER W/O GRAFT, EACH  03/15/2009    right ring finger     TUBAL LIGATION       Current Outpatient Medications   Medication Sig Dispense Refill     butalbital-acetaminophen-caffeine (ESGIC) -40 MG tablet Take 1 tablet by mouth daily as needed for headaches 10 tablet 0     pregabalin (LYRICA) 25 MG capsule Take 1 capsule (25 mg) by mouth 3 times daily 90 capsule 1     rizatriptan (MAXALT) 10 MG tablet Take 1 tablet (10 mg) by mouth at onset of headache for migraine (OK to retake one tablet two hours after first if not relief) 10 tablet 1     tiZANidine (ZANAFLEX) 4 MG tablet Take 1 tablet (4 mg) by mouth 3 times daily as needed for muscle spasms 90 tablet 1     traMADol (ULTRAM) 50 MG tablet Take 0.5-1 tablets (25-50 mg) by mouth daily as needed for severe pain Ok to fill 21 and start 21, To last 1 month 10 tablet 0     venlafaxine (EFFEXOR-XR) 75 MG 24 hr capsule Take 3 capsules (225 mg) by mouth daily 90 capsule 0     ondansetron (ZOFRAN) 4 MG tablet Take 1 tablet (4 mg) by mouth every 8 hours as needed for nausea (Patient not taking: Reported on 3/19/2021) 60 tablet 0       Allergies   Allergen Reactions     Parafon Forte Dsc [Chlorzoxazone] Hives     Poison Ivy Extract [Extract Of Poison Martha]         Social History     Tobacco Use     Smoking status: Current Some Day Smoker     Packs/day: 0.10     Years: 4.00     Pack years: 0.40     Types: Cigarettes     Smokeless tobacco:  "Never Used   Substance Use Topics     Alcohol use: No       History   Drug Use No         Objective     /60 (BP Location: Right arm, Patient Position: Chair, Cuff Size: Adult Regular)   Pulse 76   Temp 98.2  F (36.8  C) (Temporal)   Resp 16   Ht 1.581 m (5' 2.25\")   Wt 58.4 kg (128 lb 12.8 oz)   LMP 03/10/2021   SpO2 99%   BMI 23.37 kg/m      Physical Exam    GENERAL APPEARANCE: healthy, alert and no distress     EYES: EOMI, PERRL     HENT: ear canals and TM's normal and nose and mouth without ulcers or lesions     NECK: no adenopathy, no asymmetry, masses, or scars and thyroid normal to palpation     RESP: lungs clear to auscultation - no rales, rhonchi or wheezes     CV: regular rates and rhythm, normal S1 S2, no S3 or S4 and no murmur, click or rub     ABDOMEN:  soft, nontender, no HSM or masses and bowel sounds normal     MS: extremities normal- no gross deformities noted, no evidence of inflammation in joints, FROM in all extremities.     NEURO: Normal strength and tone, sensory exam grossly normal, mentation intact and speech normal     PSYCH: mentation appears normal. and affect normal/bright     LYMPHATICS: No cervical adenopathy    Recent Labs   Lab Test 01/27/20  1052   HGB 13.4         POTASSIUM 4.2   CR 0.88        Diagnostics:  No results found for this or any previous visit (from the past 24 hour(s)).   EKG: appears normal, NSR, normal axis, normal intervals, no acute ST/T changes c/w ischemia, no LVH by voltage criteria, unchanged from previous tracings     Revised Cardiac Risk Index (RCRI):  The patient has the following serious cardiovascular risks for perioperative complications:   - No serious cardiac risks = 0 points     RCRI Interpretation: 0 points: Class I (very low risk - 0.4% complication rate)           Signed Electronically by: Win Reyna PA-C  Copy of this evaluation report is provided to requesting physician.      "

## 2021-03-19 NOTE — NURSING NOTE
Health Maintenance Due   Topic Date Due     ADVANCE CARE PLANNING  Never done     Pneumococcal Vaccine: Pediatrics (0 to 5 Years) and At-Risk Patients (6 to 64 Years) (1 of 2 - PPSV23) Never done     HEPATITIS C SCREENING  Never done     DTAP/TDAP/TD IMMUNIZATION (2 - Td) 03/15/2019     PREVENTIVE CARE VISIT  04/05/2019     PHQ-9  10/24/2020     HPV TEST  04/05/2021     PAP  04/05/2021     Abeba YA LPN

## 2021-03-21 DIAGNOSIS — Z11.59 ENCOUNTER FOR SCREENING FOR OTHER VIRAL DISEASES: ICD-10-CM

## 2021-03-21 LAB
SARS-COV-2 RNA RESP QL NAA+PROBE: NORMAL
SPECIMEN SOURCE: NORMAL

## 2021-03-21 PROCEDURE — U0005 INFEC AGEN DETEC AMPLI PROBE: HCPCS | Performed by: OBSTETRICS & GYNECOLOGY

## 2021-03-21 PROCEDURE — U0003 INFECTIOUS AGENT DETECTION BY NUCLEIC ACID (DNA OR RNA); SEVERE ACUTE RESPIRATORY SYNDROME CORONAVIRUS 2 (SARS-COV-2) (CORONAVIRUS DISEASE [COVID-19]), AMPLIFIED PROBE TECHNIQUE, MAKING USE OF HIGH THROUGHPUT TECHNOLOGIES AS DESCRIBED BY CMS-2020-01-R: HCPCS | Performed by: OBSTETRICS & GYNECOLOGY

## 2021-03-22 ENCOUNTER — MYC REFILL (OUTPATIENT)
Dept: PALLIATIVE MEDICINE | Facility: CLINIC | Age: 41
End: 2021-03-22

## 2021-03-22 DIAGNOSIS — R11.0 NAUSEA: ICD-10-CM

## 2021-03-22 DIAGNOSIS — Z79.891 ENCOUNTER FOR LONG-TERM OPIATE ANALGESIC USE: Primary | ICD-10-CM

## 2021-03-22 DIAGNOSIS — M54.12 CERVICAL RADICULOPATHY: ICD-10-CM

## 2021-03-22 LAB
LABORATORY COMMENT REPORT: NORMAL
SARS-COV-2 RNA RESP QL NAA+PROBE: NEGATIVE
SPECIMEN SOURCE: NORMAL

## 2021-03-22 RX ORDER — TRAMADOL HYDROCHLORIDE 50 MG/1
25-50 TABLET ORAL DAILY PRN
Qty: 10 TABLET | Refills: 0 | Status: CANCELLED | OUTPATIENT
Start: 2021-03-22

## 2021-03-22 RX ORDER — ONDANSETRON 4 MG/1
4 TABLET, FILM COATED ORAL EVERY 8 HOURS PRN
Qty: 60 TABLET | Refills: 0 | Status: SHIPPED | OUTPATIENT
Start: 2021-03-22

## 2021-03-22 NOTE — TELEPHONE ENCOUNTER
Pt has been receiving #10 tabs tramadol monthly now for about 6 months.  Per protocol a urine drug screen and controlled substance agreement are not needed.      Medication refill information reviewed.     Last due:  Ok to fill 1/18/21 and start 1/20/21, To last 1 month     Dr. Arias do you want pt to do urine drug screen/CSA before signing tramadol refill?    Olinda He RN-BSN  [facility]

## 2021-03-22 NOTE — TELEPHONE ENCOUNTER
Urine drug screen order in.    [facility] sent to pt:  From: Olinda He RN      Created: 3/22/2021 3:52 PM      Hi Nicole,     Since you have been receiving more frequent tramadol refills you will need to do a urine drug screen and review and sign a controlled substance agreement.       The urine drug screen can be done at any [facility]. Dr. Arias want this done w/in the week.  Let me know if you want to do this at the [place] location.  I can make the appointment for you.  The controlled substance agreement can be reviewed and signed over the phone.  You can call the appointment line and ask to be transferred to a nurse.     Let me now if you have any questions.     SYMONE Prakash-BSN  [facility]

## 2021-03-22 NOTE — TELEPHONE ENCOUNTER
Please send patient a opioid contract and a uds order..  I I am okay refilling prescription prior to these being completed.  Should be done in the next week.

## 2021-03-22 NOTE — TELEPHONE ENCOUNTER
Received call from patient requesting refill(s) of ondansetron (ZOFRAN) 4 MG tablet and traMADol (ULTRAM) 50 MG tablet    Last dispensed from pharmacy on 10/26/20-zofran          02/25/21-Tramadol  10 tabs for 10 day supply    Patient's last office/virtual visit by prescribing provider on 02/22/21  Next office/virtual appointment scheduled for : none    Last urine drug screen date None  Current opioid agreement on file (completed within the last year) No Date of opioid agreement: None    E-prescribe to pharmacy-[id number] - [place], [place] - [address] [place] [address] Avenue     Will route to nursing [place] for review and preparation of prescription(s).

## 2021-03-23 NOTE — TELEPHONE ENCOUNTER
Per patient myChart message:  From: Nicole Phoenix      Created: 3/22/2021 4:30 PM      I am actually having my hysterectomy this week so I am on self isolation until then. I will make arrangements after my recovery. Please let Dr. Arias know this so he is in the loop.  Thank you, Nicole        Routed to provider.    Olinda RN-BSN  [facility]

## 2021-03-24 ENCOUNTER — ANESTHESIA (OUTPATIENT)
Dept: SURGERY | Facility: CLINIC | Age: 41
End: 2021-03-24
Payer: COMMERCIAL

## 2021-03-24 ENCOUNTER — ANESTHESIA EVENT (OUTPATIENT)
Dept: SURGERY | Facility: CLINIC | Age: 41
End: 2021-03-24
Payer: COMMERCIAL

## 2021-03-24 ENCOUNTER — HOSPITAL ENCOUNTER (OUTPATIENT)
Facility: CLINIC | Age: 41
Discharge: HOME | End: 2021-03-24
Attending: OBSTETRICS & GYNECOLOGY | Admitting: OBSTETRICS & GYNECOLOGY
Payer: COMMERCIAL

## 2021-03-24 VITALS
RESPIRATION RATE: 14 BRPM | HEART RATE: 77 BPM | TEMPERATURE: 96.4 F | HEIGHT: 62 IN | DIASTOLIC BLOOD PRESSURE: 60 MMHG | BODY MASS INDEX: 23.55 KG/M2 | WEIGHT: 128 LBS | OXYGEN SATURATION: 96 % | SYSTOLIC BLOOD PRESSURE: 101 MMHG

## 2021-03-24 DIAGNOSIS — N93.9 ABNORMAL UTERINE BLEEDING (AUB): ICD-10-CM

## 2021-03-24 DIAGNOSIS — Z90.710 S/P HYSTERECTOMY: Primary | ICD-10-CM

## 2021-03-24 LAB
ABO + RH BLD: NORMAL
ABO + RH BLD: NORMAL
BLD GP AB SCN SERPL QL: NORMAL
BLOOD BANK CMNT PATIENT-IMP: NORMAL
ERYTHROCYTE [DISTWIDTH] IN BLOOD BY AUTOMATED COUNT: 12.6 % (ref 10–15)
HCG UR QL: NEGATIVE
HCT VFR BLD AUTO: 38.8 % (ref 35–47)
HGB BLD-MCNC: 13.1 G/DL (ref 11.7–15.7)
MCH RBC QN AUTO: 31.6 PG (ref 26.5–33)
MCHC RBC AUTO-ENTMCNC: 33.8 G/DL (ref 31.5–36.5)
MCV RBC AUTO: 94 FL (ref 78–100)
PLATELET # BLD AUTO: 253 10E9/L (ref 150–450)
RBC # BLD AUTO: 4.14 10E12/L (ref 3.8–5.2)
SPECIMEN EXP DATE BLD: NORMAL
WBC # BLD AUTO: 10.3 10E9/L (ref 4–11)

## 2021-03-24 PROCEDURE — 86900 BLOOD TYPING SEROLOGIC ABO: CPT | Performed by: OBSTETRICS & GYNECOLOGY

## 2021-03-24 PROCEDURE — 250N000011 HC RX IP 250 OP 636: Performed by: NURSE ANESTHETIST, CERTIFIED REGISTERED

## 2021-03-24 PROCEDURE — 88307 TISSUE EXAM BY PATHOLOGIST: CPT | Mod: TC | Performed by: OBSTETRICS & GYNECOLOGY

## 2021-03-24 PROCEDURE — 86850 RBC ANTIBODY SCREEN: CPT | Performed by: OBSTETRICS & GYNECOLOGY

## 2021-03-24 PROCEDURE — 710N000012 HC RECOVERY PHASE 2, PER MINUTE: Performed by: OBSTETRICS & GYNECOLOGY

## 2021-03-24 PROCEDURE — 360N000077 HC SURGERY LEVEL 4, PER MIN: Performed by: OBSTETRICS & GYNECOLOGY

## 2021-03-24 PROCEDURE — 370N000017 HC ANESTHESIA TECHNICAL FEE, PER MIN: Performed by: OBSTETRICS & GYNECOLOGY

## 2021-03-24 PROCEDURE — 272N000001 HC OR GENERAL SUPPLY STERILE: Performed by: OBSTETRICS & GYNECOLOGY

## 2021-03-24 PROCEDURE — 258N000003 HC RX IP 258 OP 636: Performed by: NURSE ANESTHETIST, CERTIFIED REGISTERED

## 2021-03-24 PROCEDURE — 58571 TLH W/T/O 250 G OR LESS: CPT | Performed by: OBSTETRICS & GYNECOLOGY

## 2021-03-24 PROCEDURE — 999N000141 HC STATISTIC PRE-PROCEDURE NURSING ASSESSMENT: Performed by: OBSTETRICS & GYNECOLOGY

## 2021-03-24 PROCEDURE — 88307 TISSUE EXAM BY PATHOLOGIST: CPT | Mod: 26 | Performed by: PATHOLOGY

## 2021-03-24 PROCEDURE — 250N000009 HC RX 250: Performed by: NURSE ANESTHETIST, CERTIFIED REGISTERED

## 2021-03-24 PROCEDURE — 85027 COMPLETE CBC AUTOMATED: CPT | Performed by: OBSTETRICS & GYNECOLOGY

## 2021-03-24 PROCEDURE — 86901 BLOOD TYPING SEROLOGIC RH(D): CPT | Performed by: OBSTETRICS & GYNECOLOGY

## 2021-03-24 PROCEDURE — 250N000013 HC RX MED GY IP 250 OP 250 PS 637: Performed by: OBSTETRICS & GYNECOLOGY

## 2021-03-24 PROCEDURE — 81025 URINE PREGNANCY TEST: CPT | Performed by: OBSTETRICS & GYNECOLOGY

## 2021-03-24 PROCEDURE — 36415 COLL VENOUS BLD VENIPUNCTURE: CPT | Performed by: OBSTETRICS & GYNECOLOGY

## 2021-03-24 PROCEDURE — 250N000011 HC RX IP 250 OP 636: Performed by: OBSTETRICS & GYNECOLOGY

## 2021-03-24 PROCEDURE — 250N000013 HC RX MED GY IP 250 OP 250 PS 637: Performed by: NURSE ANESTHETIST, CERTIFIED REGISTERED

## 2021-03-24 PROCEDURE — 710N000010 HC RECOVERY PHASE 1, LEVEL 2, PER MIN: Performed by: OBSTETRICS & GYNECOLOGY

## 2021-03-24 PROCEDURE — 250N000009 HC RX 250: Performed by: OBSTETRICS & GYNECOLOGY

## 2021-03-24 RX ORDER — CEFAZOLIN SODIUM 2 G/100ML
2 INJECTION, SOLUTION INTRAVENOUS
Status: COMPLETED | OUTPATIENT
Start: 2021-03-24 | End: 2021-03-24

## 2021-03-24 RX ORDER — LIDOCAINE HYDROCHLORIDE 20 MG/ML
INJECTION, SOLUTION INFILTRATION; PERINEURAL PRN
Status: DISCONTINUED | OUTPATIENT
Start: 2021-03-24 | End: 2021-03-24

## 2021-03-24 RX ORDER — ONDANSETRON 2 MG/ML
4 INJECTION INTRAMUSCULAR; INTRAVENOUS EVERY 30 MIN PRN
Status: DISCONTINUED | OUTPATIENT
Start: 2021-03-24 | End: 2021-03-24 | Stop reason: HOSPADM

## 2021-03-24 RX ORDER — ONDANSETRON 4 MG/1
4 TABLET, ORALLY DISINTEGRATING ORAL EVERY 30 MIN PRN
Status: DISCONTINUED | OUTPATIENT
Start: 2021-03-24 | End: 2021-03-24 | Stop reason: HOSPADM

## 2021-03-24 RX ORDER — NALOXONE HYDROCHLORIDE 0.4 MG/ML
0.4 INJECTION, SOLUTION INTRAMUSCULAR; INTRAVENOUS; SUBCUTANEOUS
Status: DISCONTINUED | OUTPATIENT
Start: 2021-03-24 | End: 2021-03-24 | Stop reason: HOSPADM

## 2021-03-24 RX ORDER — BUPIVACAINE HYDROCHLORIDE AND EPINEPHRINE 5; 5 MG/ML; UG/ML
INJECTION, SOLUTION EPIDURAL; INTRACAUDAL; PERINEURAL PRN
Status: DISCONTINUED | OUTPATIENT
Start: 2021-03-24 | End: 2021-03-24 | Stop reason: HOSPADM

## 2021-03-24 RX ORDER — ACETAMINOPHEN 325 MG/1
975 TABLET ORAL ONCE
Status: COMPLETED | OUTPATIENT
Start: 2021-03-24 | End: 2021-03-24

## 2021-03-24 RX ORDER — FENTANYL CITRATE 50 UG/ML
INJECTION, SOLUTION INTRAMUSCULAR; INTRAVENOUS PRN
Status: DISCONTINUED | OUTPATIENT
Start: 2021-03-24 | End: 2021-03-24

## 2021-03-24 RX ORDER — DEXAMETHASONE SODIUM PHOSPHATE 10 MG/ML
INJECTION, SOLUTION INTRAMUSCULAR; INTRAVENOUS PRN
Status: DISCONTINUED | OUTPATIENT
Start: 2021-03-24 | End: 2021-03-24

## 2021-03-24 RX ORDER — ONDANSETRON 2 MG/ML
INJECTION INTRAMUSCULAR; INTRAVENOUS PRN
Status: DISCONTINUED | OUTPATIENT
Start: 2021-03-24 | End: 2021-03-24

## 2021-03-24 RX ORDER — ACETAMINOPHEN 325 MG/1
975 TABLET ORAL ONCE
Status: DISCONTINUED | OUTPATIENT
Start: 2021-03-24 | End: 2021-03-24 | Stop reason: HOSPADM

## 2021-03-24 RX ORDER — PROPOFOL 10 MG/ML
INJECTION, EMULSION INTRAVENOUS CONTINUOUS PRN
Status: DISCONTINUED | OUTPATIENT
Start: 2021-03-24 | End: 2021-03-24

## 2021-03-24 RX ORDER — OXYCODONE HYDROCHLORIDE 5 MG/1
10 TABLET ORAL
Status: DISCONTINUED | OUTPATIENT
Start: 2021-03-24 | End: 2021-03-24 | Stop reason: HOSPADM

## 2021-03-24 RX ORDER — IBUPROFEN 800 MG/1
800 TABLET, FILM COATED ORAL ONCE
Status: DISCONTINUED | OUTPATIENT
Start: 2021-03-24 | End: 2021-03-24 | Stop reason: HOSPADM

## 2021-03-24 RX ORDER — CEFAZOLIN SODIUM 1 G/3ML
1 INJECTION, POWDER, FOR SOLUTION INTRAMUSCULAR; INTRAVENOUS SEE ADMIN INSTRUCTIONS
Status: DISCONTINUED | OUTPATIENT
Start: 2021-03-24 | End: 2021-03-24 | Stop reason: HOSPADM

## 2021-03-24 RX ORDER — IBUPROFEN 800 MG/1
800 TABLET, FILM COATED ORAL EVERY 6 HOURS PRN
Qty: 30 TABLET | Refills: 0 | Status: SHIPPED | OUTPATIENT
Start: 2021-03-24 | End: 2021-05-03

## 2021-03-24 RX ORDER — DIMENHYDRINATE 50 MG/ML
25 INJECTION, SOLUTION INTRAMUSCULAR; INTRAVENOUS
Status: DISCONTINUED | OUTPATIENT
Start: 2021-03-24 | End: 2021-03-24 | Stop reason: HOSPADM

## 2021-03-24 RX ORDER — HYDROMORPHONE HYDROCHLORIDE 1 MG/ML
.3-.5 INJECTION, SOLUTION INTRAMUSCULAR; INTRAVENOUS; SUBCUTANEOUS EVERY 10 MIN PRN
Status: DISCONTINUED | OUTPATIENT
Start: 2021-03-24 | End: 2021-03-24 | Stop reason: HOSPADM

## 2021-03-24 RX ORDER — FENTANYL CITRATE 50 UG/ML
25-50 INJECTION, SOLUTION INTRAMUSCULAR; INTRAVENOUS
Status: DISCONTINUED | OUTPATIENT
Start: 2021-03-24 | End: 2021-03-24 | Stop reason: HOSPADM

## 2021-03-24 RX ORDER — OXYCODONE HYDROCHLORIDE 5 MG/1
5-10 TABLET ORAL EVERY 4 HOURS PRN
Qty: 12 TABLET | Refills: 0 | Status: SHIPPED | OUTPATIENT
Start: 2021-03-24 | End: 2021-03-26

## 2021-03-24 RX ORDER — LIDOCAINE 40 MG/G
CREAM TOPICAL
Status: DISCONTINUED | OUTPATIENT
Start: 2021-03-24 | End: 2021-03-24 | Stop reason: HOSPADM

## 2021-03-24 RX ORDER — SODIUM CHLORIDE, SODIUM LACTATE, POTASSIUM CHLORIDE, CALCIUM CHLORIDE 600; 310; 30; 20 MG/100ML; MG/100ML; MG/100ML; MG/100ML
INJECTION, SOLUTION INTRAVENOUS CONTINUOUS
Status: DISCONTINUED | OUTPATIENT
Start: 2021-03-24 | End: 2021-03-24 | Stop reason: HOSPADM

## 2021-03-24 RX ORDER — KETAMINE HYDROCHLORIDE 10 MG/ML
INJECTION INTRAMUSCULAR; INTRAVENOUS PRN
Status: DISCONTINUED | OUTPATIENT
Start: 2021-03-24 | End: 2021-03-24

## 2021-03-24 RX ORDER — PROPOFOL 10 MG/ML
INJECTION, EMULSION INTRAVENOUS PRN
Status: DISCONTINUED | OUTPATIENT
Start: 2021-03-24 | End: 2021-03-24

## 2021-03-24 RX ORDER — NALOXONE HYDROCHLORIDE 0.4 MG/ML
0.2 INJECTION, SOLUTION INTRAMUSCULAR; INTRAVENOUS; SUBCUTANEOUS
Status: DISCONTINUED | OUTPATIENT
Start: 2021-03-24 | End: 2021-03-24 | Stop reason: HOSPADM

## 2021-03-24 RX ORDER — KETOROLAC TROMETHAMINE 30 MG/ML
INJECTION, SOLUTION INTRAMUSCULAR; INTRAVENOUS PRN
Status: DISCONTINUED | OUTPATIENT
Start: 2021-03-24 | End: 2021-03-24

## 2021-03-24 RX ORDER — POLYETHYLENE GLYCOL 3350 17 G/17G
1 POWDER, FOR SOLUTION ORAL DAILY
Qty: 507 G | Refills: 0 | Status: SHIPPED | OUTPATIENT
Start: 2021-03-24 | End: 2021-07-28

## 2021-03-24 RX ORDER — ONDANSETRON 4 MG/1
4-8 TABLET, ORALLY DISINTEGRATING ORAL EVERY 8 HOURS PRN
Qty: 12 TABLET | Refills: 0 | Status: SHIPPED | OUTPATIENT
Start: 2021-03-24 | End: 2021-05-03

## 2021-03-24 RX ORDER — OXYCODONE HYDROCHLORIDE 5 MG/1
5 TABLET ORAL EVERY 4 HOURS PRN
Status: DISCONTINUED | OUTPATIENT
Start: 2021-03-24 | End: 2021-03-24 | Stop reason: HOSPADM

## 2021-03-24 RX ADMIN — MIDAZOLAM 2 MG: 1 INJECTION INTRAMUSCULAR; INTRAVENOUS at 07:34

## 2021-03-24 RX ADMIN — LIDOCAINE HYDROCHLORIDE 80 MG: 20 INJECTION, SOLUTION INFILTRATION; PERINEURAL at 07:40

## 2021-03-24 RX ADMIN — OXYCODONE HYDROCHLORIDE 5 MG: 5 TABLET ORAL at 09:48

## 2021-03-24 RX ADMIN — OXYCODONE HYDROCHLORIDE 5 MG: 5 TABLET ORAL at 09:40

## 2021-03-24 RX ADMIN — PROPOFOL 200 MCG/KG/MIN: 10 INJECTION, EMULSION INTRAVENOUS at 07:41

## 2021-03-24 RX ADMIN — FENTANYL CITRATE 50 MCG: 50 INJECTION, SOLUTION INTRAMUSCULAR; INTRAVENOUS at 09:29

## 2021-03-24 RX ADMIN — ROCURONIUM BROMIDE 50 MG: 10 INJECTION INTRAVENOUS at 07:41

## 2021-03-24 RX ADMIN — FENTANYL CITRATE 50 MCG: 50 INJECTION, SOLUTION INTRAMUSCULAR; INTRAVENOUS at 07:40

## 2021-03-24 RX ADMIN — SODIUM CHLORIDE, POTASSIUM CHLORIDE, SODIUM LACTATE AND CALCIUM CHLORIDE: 600; 310; 30; 20 INJECTION, SOLUTION INTRAVENOUS at 07:34

## 2021-03-24 RX ADMIN — ACETAMINOPHEN 975 MG: 325 TABLET, FILM COATED ORAL at 06:22

## 2021-03-24 RX ADMIN — KETOROLAC TROMETHAMINE 30 MG: 30 INJECTION, SOLUTION INTRAMUSCULAR at 09:10

## 2021-03-24 RX ADMIN — PROPOFOL 200 MG: 10 INJECTION, EMULSION INTRAVENOUS at 07:40

## 2021-03-24 RX ADMIN — CEFAZOLIN SODIUM 2 G: 2 INJECTION, SOLUTION INTRAVENOUS at 07:44

## 2021-03-24 RX ADMIN — HYDROMORPHONE HYDROCHLORIDE 0.5 MG: 1 INJECTION, SOLUTION INTRAMUSCULAR; INTRAVENOUS; SUBCUTANEOUS at 09:07

## 2021-03-24 RX ADMIN — SUGAMMADEX 200 MG: 100 INJECTION, SOLUTION INTRAVENOUS at 09:13

## 2021-03-24 RX ADMIN — ONDANSETRON 4 MG: 2 INJECTION INTRAMUSCULAR; INTRAVENOUS at 09:01

## 2021-03-24 RX ADMIN — FENTANYL CITRATE 50 MCG: 50 INJECTION, SOLUTION INTRAMUSCULAR; INTRAVENOUS at 09:44

## 2021-03-24 RX ADMIN — KETAMINE HYDROCHLORIDE 20 MG: 10 INJECTION, SOLUTION INTRAMUSCULAR; INTRAVENOUS at 08:18

## 2021-03-24 RX ADMIN — FENTANYL CITRATE 50 MCG: 50 INJECTION, SOLUTION INTRAMUSCULAR; INTRAVENOUS at 08:07

## 2021-03-24 RX ADMIN — DEXAMETHASONE SODIUM PHOSPHATE 4 MG: 10 INJECTION, SOLUTION INTRAMUSCULAR; INTRAVENOUS at 08:13

## 2021-03-24 RX ADMIN — HYDROMORPHONE HYDROCHLORIDE 0.5 MG: 1 INJECTION, SOLUTION INTRAMUSCULAR; INTRAVENOUS; SUBCUTANEOUS at 09:31

## 2021-03-24 RX ADMIN — DEXMEDETOMIDINE HYDROCHLORIDE 10 MCG: 100 INJECTION, SOLUTION INTRAVENOUS at 08:15

## 2021-03-24 RX ADMIN — DEXMEDETOMIDINE HYDROCHLORIDE 10 MCG: 100 INJECTION, SOLUTION INTRAVENOUS at 09:02

## 2021-03-24 ASSESSMENT — MIFFLIN-ST. JEOR: SCORE: 1198.85

## 2021-03-24 ASSESSMENT — LIFESTYLE VARIABLES: TOBACCO_USE: 1

## 2021-03-24 NOTE — DISCHARGE INSTRUCTIONS
[facility]   Adult Discharge Orders & Instructions     For 24 hours after surgery    1. Get plenty of rest.  A responsible adult must stay with you for at least 24 hours after you leave the hospital.   2. Do not drive or use heavy equipment.  If you have weakness or tingling, don't drive or use heavy equipment until this feeling goes away.  3. Do not drink alcohol.  4. Avoid strenuous or risky activities.  Ask for help when climbing stairs.   5. You may feel lightheaded.  IF so, sit for a few minutes before standing.  Have someone help you get up.   6. If you have nausea (feel sick to your stomach): Drink only clear liquids such as apple juice, ginger ale, broth or 7-Up.  Rest may also help.  Be sure to drink enough fluids.  Move to a regular diet as you feel able.  7. You may have a slight fever. Call the doctor if your fever is over 100 F (37.7 C) (taken under the tongue) or lasts longer than 24 hours.  8. You may have a dry mouth, a sore throat, muscle aches or trouble sleeping.  These should go away after 24 hours.  9. Do not make important or legal decisions.   Call your doctor for any of the followin.  Signs of infection (fever, growing tenderness at the surgery site, a large amount of drainage or bleeding, severe pain, foul-smelling drainage, redness, swelling).    2. It has been over 8 to 10 hours since surgery and you are still not able to urinate (pass water).    3.  Headache for over 24 hours.      .  To contact a doctor, call ________________________________________

## 2021-03-24 NOTE — ANESTHESIA CARE TRANSFER NOTE
Patient: Nicole Phoenix    Procedure(s):  HYSTERECTOMY, TOTAL, LAPAROSCOPIC, WITH BILATERAL SALPINGECTOMY    Diagnosis: Abnormal uterine bleeding (AUB) [N93.9]  Diagnosis Additional Information: No value filed.    Anesthesia Type:   General     Note:    Oropharynx: oropharynx clear of all foreign objects and spontaneously breathing  Level of Consciousness: drowsy  Oxygen Supplementation: face mask    Independent Airway: airway patency satisfactory and stable  Dentition: dentition unchanged  Vital Signs Stable: post-procedure vital signs reviewed and stable  Report to RN Given: handoff report given  Patient transferred to: PACU    Handoff Report: Identifed the Patient, Identified the Reponsible Provider, Reviewed the pertinent medical history, Discussed the surgical course, Reviewed Intra-OP anesthesia mangement and issues during anesthesia, Set expectations for post-procedure period and Allowed opportunity for questions and acknowledgement of understanding      Vitals: (Last set prior to Anesthesia Care Transfer)  CRNA VITALS  3/24/2021 0850 - 3/24/2021 0925      3/24/2021             Pulse:  78    SpO2:  99 %    Resp Rate (observed):  (!) 4        Electronically Signed By: GINA Chadwick CRNA  March 24, 2021  9:25 AM

## 2021-03-24 NOTE — ANESTHESIA PREPROCEDURE EVALUATION
Anesthesia Pre-Procedure Evaluation    Patient: Nicole Phoenix   MRN: [id number] : 1980        Preoperative Diagnosis: Abnormal uterine bleeding (AUB) [N93.9]   Procedure : Procedure(s):  HYSTERECTOMY, TOTAL, LAPAROSCOPIC, WITH BILATERAL SALPINGECTOMY     Past Medical History:   Diagnosis Date     Cervical high risk HPV (human papillomavirus) test positive 16    Neg      Cervicalgia 10/18/2016     Neck muscle spasm 10/18/2016     NO ACTIVE PROBLEMS      Syncope, unspecified syncope type 10/18/2016      Past Surgical History:   Procedure Laterality Date     C  DELIVERY+POSTPARTUM CARE       HC REMOVAL OF TONSILS,<11 Y/O       HC REPAIR EXTEN TENDON FINGER W/O GRAFT, EACH  03/15/2009    right ring finger     TUBAL LIGATION        Allergies   Allergen Reactions     Parafon Forte Dsc [Chlorzoxazone] Hives     Poison Ivy Extract [Extract Of Poison Martha]       Social History     Tobacco Use     Smoking status: Current Some Day Smoker     Packs/day: 0.10     Years: 4.00     Pack years: 0.40     Types: Cigarettes     Smokeless tobacco: Never Used   Substance Use Topics     Alcohol use: No      Wt Readings from Last 1 Encounters:   21 58.1 kg (128 lb)        Anesthesia Evaluation   Pt has had prior anesthetic. Type: General and MAC.    No history of anesthetic complications       ROS/MED HX  ENT/Pulmonary: Comment: Mucocele of mouth    (+) sleep apnea, tobacco use, Current use,     Neurologic:     (+) migraines,     Cardiovascular:  - neg cardiovascular ROS   (+) -----Previous cardiac testing   Echo: Date: Results:    Stress Test: Date: Results:    ECG Reviewed: Date: 3/19/21 Results:  Appears normal, NSR, normal axis, normal intervals, no acute ST/T changes c/w ischemia, no LVH by voltage criteria, unchanged from previous tracings  Cath: Date: Results:      METS/Exercise Tolerance: >4 METS    Hematologic:  - neg hematologic  ROS     Musculoskeletal: Comment: Cervicalgia      GI/Hepatic:  -  neg GI/hepatic ROS     Renal/Genitourinary:  - neg Renal ROS     Endo:  - neg endo ROS     Psychiatric/Substance Use:     (+) psychiatric history depression     Infectious Disease:  - neg infectious disease ROS     Malignancy:  - neg malignancy ROS     Other: Comment: fibromyalgia - neg other ROS          Physical Exam    Airway  airway exam normal      Mallampati: II   TM distance: > 3 FB   Neck ROM: full   Mouth opening: > 3 cm    Respiratory Devices and Support         Dental  no notable dental history         Cardiovascular   cardiovascular exam normal       Rhythm and rate: regular and normal     Pulmonary   pulmonary exam normal        breath sounds clear to auscultation           OUTSIDE LABS:  CBC:   Lab Results   Component Value Date    WBC 8.8 03/19/2021    WBC 10.4 01/27/2020    HGB 14.0 03/19/2021    HGB 13.4 01/27/2020    HCT 41.7 03/19/2021    HCT 40.4 01/27/2020     03/19/2021     01/27/2020     BMP:   Lab Results   Component Value Date     03/19/2021     01/27/2020    POTASSIUM 3.9 03/19/2021    POTASSIUM 4.2 01/27/2020    CHLORIDE 104 03/19/2021    CHLORIDE 107 01/27/2020    CO2 28 03/19/2021    CO2 28 01/27/2020    BUN 15 03/19/2021    BUN 13 01/27/2020    CR 0.68 03/19/2021    CR 0.88 01/27/2020    GLC 88 03/19/2021    GLC 95 01/27/2020     COAGS: No results found for: PTT, INR, FIBR  POC:   Lab Results   Component Value Date    HCG Negative 03/24/2021     HEPATIC:   Lab Results   Component Value Date    ALBUMIN 4.1 03/19/2021    PROTTOTAL 7.7 03/19/2021    ALT 27 03/19/2021    AST 8 03/19/2021    ALKPHOS 69 03/19/2021    BILITOTAL 0.4 03/19/2021     OTHER:   Lab Results   Component Value Date    CIRO 9.1 03/19/2021    TSH 0.81 03/08/2018       Anesthesia Plan    ASA Status:  2   NPO Status:  NPO Appropriate    Anesthesia Type: General.     - Airway: ETT   Induction: Intravenous.   Maintenance: TIVA.        Consents    Anesthesia Plan(s) and associated risks, benefits, and  realistic alternatives discussed. Questions answered and patient/representative(s) expressed understanding.     - Discussed with:  Patient      - Extended Intubation/Ventilatory Support Discussed: No.      - Patient is DNR/DNI Status: No    Use of blood products discussed: Yes.     - Discussed with: Patient.     - Consented: consented to blood products     Postoperative Care    Pain management: Multi-modal analgesia, IV analgesics, Oral pain medications.   PONV prophylaxis: Background Propofol Infusion, Ondansetron (or other 5HT-3), Dexamethasone or Solumedrol     Comments:                GINA Chadwick CRNA

## 2021-03-24 NOTE — OP NOTE
Operative Note   Nicole Phoenix  [id number]  3/23/2021    Preoperative Diagnosis:   Heavy menstrual bleeding non responsive to hormonal suppression   Prior  x2  Prior surgical sterilization  Menstrual migraines      Postoperative Diagnosis:   Same as above      Procedure:   Total laparoscopic hysterectomy with bilateral salpingectomy     Surgeon: Ivan Beasley MD    Anesthesia: general endotracheal     Complications: none      EBL: 20 mL   IVF: 700 mL crystalloid   UOP: 50 mL clear urine     Findings: Exam under anesthesia revealed normal external genitalia, vagina, and cervix. Small mobile anteverted uterus and no adnexal mass on bimanual exam. Upon entry of the abdomen with the laparoscope, no entry trauma noted. Pelvic exam revealed normal uterus, distal tube segments, and ovaries. Simple paratubal cysts present on the left. Moderately dense bladder reflection adhesions present.  A survey of the upper abdomen showed normal anatomy. Bilateral ureters visualized during and at the end of the case with peristalsis noted.     Indications: Nicole Phoenix is a 41 year old  with historically very heavy cycle, some irregular spacing, and multiple menstrual symptoms, not controlled with hormonal suppression. She is s/p prior sterilization and desires definitve management. A total laparoscopic hysterectomy with bilateral salpingectomy was recommended at that time. All risks, benefits and alternatives were discussed and written informed consent was obtained.     Procedure: The patient was taken to the operating room where general anesthesia was induced without difficulty. She was then examined under anesthesia with the above noted findings. She was then prepared and draped in the normal sterile fashion in the dorsal lithotomy position using yellow fin stirrups. Attention was turned to the vagina. A speculum was placed for visualization of the cervix and the anterior lip of the cervix was then grasped with a  single-tooth tenaculum. A paracervical block was then placed with 20 ml of 0.5% marcaine with epi. The cervix was then serially dilated. A medium Vcare was then placed through the cervix with balloon inflated, the tenaculum and speculum removed, and the Vcare cup advanced into the vagina over the cervix and secured into place with correct placement digitally confirmed.     Attention was then turned to the abdomen where the the umbilicus was injected with 0.5% marcaine with epi, a 5mm infra-umbilical stab skin incision was made. Entry was made directly using the visi-port and abdomen insufflated. Trocar removed and camera replace, noting no entry trauma. Following injection of 20 ml of 0.5% marcaine with epi, a second 5mm skin incision was then made in the LLQ and the trocar and sleeve advanced under direct visualization, and similarly, a third skin incision, 10 mm, was made in the RLQ and the trocar and sleeve advanced under direct visualization.     A survey of the patient's abdomen and pelvis was made with the above noted findings. Attention was then turned to the pelvis where the left tube was elevated, then sequentially ligated lateraly to medially using the Ligasure. The left uteroovarian ligament and round ligament were similarly ligated using the Ligasure. The anterior leaf of the broad ligament was then incised using monopolar scissors along the bladder reflection to the midline. The same steps were there performed on the right. However, the right tube was transected at the cornua and passed out through the port. A bladder flap was mobilized and the peritoneum on the vesicouterine fold was incised to mobilize the bladder. The uterine arteries were then dissected, transected, and ligated using the Ligasure, and the pedicles were freed down to the level of the colpotomy ring using the monopolar scissors. The vagina was transected along the Vcare cup using monopolar scissors, resulting in separation of the  uterus, cervix, and left tube. The tissue was then delivered through the vagina, and a pneumo-occluder was inserted to maintain pneumoperitoneum. The vaginal vault was closed with running Vlock using the Endostitch device. The abdomen was irrigated, and excellent hemostasis was noted.  Bilateral ureters visualized with peristalsis noted.    A final look at the surgical sites showed excellent hemostasis and the 10 mm port was closed using the Shukri Anastacia. Then, the abdomen was the desufflated and all trocars were then removed. The skin incisions were then closed using 4-0 vicryl in a subcuticular fashion followed by dermabond. The morillo catheter was left in place to be removed in PACU.    The patient tolerated the procedure well. Sponge, lap and needle counts were correct. The patient was taken to the recovery area in stable condition.    Ivan Beaslye MD March 24, 2021 9:21 AM

## 2021-03-24 NOTE — ANESTHESIA POSTPROCEDURE EVALUATION
Patient: Nicole Phoenix    Procedure(s):  HYSTERECTOMY, TOTAL, LAPAROSCOPIC, WITH BILATERAL SALPINGECTOMY    Diagnosis:Abnormal uterine bleeding (AUB) [N93.9]  Diagnosis Additional Information: No value filed.    Anesthesia Type:  General    Note:  Disposition: Outpatient   Postop Pain Control: Uneventful            Sign Out: Well controlled pain   PONV: No   Neuro/Psych: Uneventful            Sign Out: Acceptable/Baseline neuro status   Airway/Respiratory: Uneventful            Sign Out: Acceptable/Baseline resp. status   CV/Hemodynamics: Uneventful            Sign Out: Acceptable CV status   Other NRE: NONE   DID A NON-ROUTINE EVENT OCCUR? No    Event details/Postop Comments:  Pt was happy with anesthesia care.  No complications.  I will follow up with the pt if needed.         Last vitals:  Vitals:    03/24/21 0950 03/24/21 0955 03/24/21 1000   BP: 106/63 106/66 101/60   Pulse: 79 78 77   Resp: 9 16 14   Temp:      SpO2: 96% 95% 96%       Last vitals prior to Anesthesia Care Transfer:  CRNA VITALS  3/24/2021 0850 - 3/24/2021 0950      3/24/2021             Pulse:  78    SpO2:  99 %    Resp Rate (observed):  (!) 4          Electronically Signed By: GINA Chadwick CRNA  March 24, 2021  2:14 PM

## 2021-03-24 NOTE — ANESTHESIA PROCEDURE NOTES
Airway       Patient location during procedure: OR  Staff -        CRNA: Opal John APRN CRNA       Performed By: CRNA  Consent for Airway        Urgency: elective  Indications and Patient Condition       Indications for airway management: loraine-procedural       Induction type:intravenous       Mask difficulty assessment: 1 - vent by mask    Final Airway Details       Final airway type: endotracheal airway       Successful airway: ETT - single  Endotracheal Airway Details        ETT size (mm): 6.5       Cuffed: yes       Cuff volume (mL): 8       Successful intubation technique: direct laryngoscopy       DL Blade Type: Pinedo 2       Grade View of Cords: 1       Adjucts: stylet       Position: Right       Measured from: lips       Secured at (cm): 21       Bite block used: None    Post intubation assessment        Placement verified by: capnometry, equal breath sounds and chest rise        Number of attempts at approach: 1       Number of other approaches attempted: 0       Secured with: silk tape       Ease of procedure: easy       Dentition: Intact and Unchanged    Medication(s) Administered   Medication Administration Time: 3/24/2021 7:43 AM    Additional Comments       Atraumatic. No apparent complications.

## 2021-03-25 LAB — COPATH REPORT: NORMAL

## 2021-03-26 ENCOUNTER — TELEPHONE (OUTPATIENT)
Dept: FAMILY MEDICINE | Facility: CLINIC | Age: 41
End: 2021-03-26

## 2021-03-26 DIAGNOSIS — Z90.710 S/P HYSTERECTOMY: ICD-10-CM

## 2021-03-26 RX ORDER — OXYCODONE HYDROCHLORIDE 5 MG/1
5-10 TABLET ORAL EVERY 4 HOURS PRN
Qty: 12 TABLET | Refills: 0 | Status: SHIPPED | OUTPATIENT
Start: 2021-03-26 | End: 2021-05-03

## 2021-03-26 NOTE — TELEPHONE ENCOUNTER
Prior Authorization Retail Medication Request    Medication/Dose: oxycodone 5mg  ICD code (if different than what is on RX):     Previously Tried and Failed:     Rationale:       Insurance Name:   commercial  Insurance ID:  [id number]      Pharmacy Information (if different than what is on RX)  Name:     Phone:

## 2021-03-29 ENCOUNTER — MYC MEDICAL ADVICE (OUTPATIENT)
Dept: OBGYN | Facility: CLINIC | Age: 41
End: 2021-03-29

## 2021-03-29 ENCOUNTER — MYC REFILL (OUTPATIENT)
Dept: FAMILY MEDICINE | Facility: CLINIC | Age: 41
End: 2021-03-29

## 2021-03-29 DIAGNOSIS — F33.2 SEVERE EPISODE OF RECURRENT MAJOR DEPRESSIVE DISORDER, WITHOUT PSYCHOTIC FEATURES (H): ICD-10-CM

## 2021-03-30 RX ORDER — VENLAFAXINE HYDROCHLORIDE 75 MG/1
225 CAPSULE, EXTENDED RELEASE ORAL DAILY
Qty: 90 CAPSULE | Refills: 0 | Status: SHIPPED | OUTPATIENT
Start: 2021-03-30 | End: 2021-05-03

## 2021-03-30 NOTE — TELEPHONE ENCOUNTER
Central Prior Authorization Team   Phone: [phone]      PA NOT NEEDED    Medication: oxycodone 5mg-PA NOT NEEDED  Insurance Company: "[employer] - Phone [phone] Fax [phone]  Pharmacy Filling the Rx: [facility] [place], [place] - [address] [place] [address]   Filling Pharmacy Phone: [phone]  Filling Pharmacy Fax:    Start Date: 3/30/2021    Started PA on CMM and a response of Prior Authorization Not Required.  Called pharmacy, they got a paid claim on 3/29/21.  Patient has picked up.

## 2021-04-01 ENCOUNTER — MYC REFILL (OUTPATIENT)
Dept: FAMILY MEDICINE | Facility: CLINIC | Age: 41
End: 2021-04-01

## 2021-04-01 ENCOUNTER — MYC MEDICAL ADVICE (OUTPATIENT)
Dept: FAMILY MEDICINE | Facility: CLINIC | Age: 41
End: 2021-04-01

## 2021-04-01 DIAGNOSIS — Z90.710 S/P HYSTERECTOMY: ICD-10-CM

## 2021-04-01 RX ORDER — OXYCODONE HYDROCHLORIDE 5 MG/1
5-10 TABLET ORAL EVERY 4 HOURS PRN
Qty: 12 TABLET | Refills: 0 | Status: CANCELLED | OUTPATIENT
Start: 2021-04-01

## 2021-04-01 NOTE — TELEPHONE ENCOUNTER
Patient was last seem om 3/19/21.  She would like to decrease her Effexor.  Do you want to have a Virtual visit with her to discuss?  SHAQUILLE CalzadaN, RN

## 2021-04-01 NOTE — TELEPHONE ENCOUNTER
Requested Prescriptions   Pending Prescriptions Disp Refills     oxyCODONE (ROXICODONE) 5 MG tablet 12 tablet 0     Sig: Take 1-2 tablets (5-10 mg) by mouth every 4 hours as needed for moderate to severe pain       There is no refill protocol information for this order        Pt had TLH on 3/24/2021    Last prescribed on 3/26/2021 for 12 tablets with 0 refills.    Pt rating pain 5/10, pain primarily on R) side radiating to her back and pain at umbilicus site. Pt states bilateral incision sites look good but umbilicus incision site is opened, denies current infections or other urinary concerns.    Routing refill request to provider for review/approval because:  Drug not on the [facility] [place] protocol       Gilma Pinedo RN on 4/1/2021 at 11:45 AM

## 2021-04-02 ENCOUNTER — MYC MEDICAL ADVICE (OUTPATIENT)
Dept: OBGYN | Facility: CLINIC | Age: 41
End: 2021-04-02

## 2021-04-02 DIAGNOSIS — G89.18 POSTOPERATIVE PAIN: Primary | ICD-10-CM

## 2021-04-02 RX ORDER — OXYCODONE HYDROCHLORIDE 5 MG/1
5 TABLET ORAL EVERY 6 HOURS PRN
Qty: 8 TABLET | Refills: 0 | Status: SHIPPED | OUTPATIENT
Start: 2021-04-02 | End: 2021-04-04

## 2021-04-02 NOTE — TELEPHONE ENCOUNTER
Lluvia Drummond, DO  You 2 minutes ago (12:31 PM)     Will send in short course for the weekend. Will require patient to be seen before additional doses will be given.     Thank you,   Lluvia Drummond,     Message text      Gilma Pinedo RN on 4/2/2021 at 12:35 PM

## 2021-04-02 NOTE — TELEPHONE ENCOUNTER
Lluvia Drummond, DO  [facility] Triage 9 hours ago (10:07 PM)     I do not recommend a refill of oxycodone this far out from surgery without being evaluated first. Patient can try alternating tylenol and ibuprofen at home for post-op pain. She can also use heat/ice packs to her abdomen for further relief.   If pain is severe or she is having other concerning symptoms, she should be seen in the office for further evaluation, or in the ER if after hours.     Thank you,   Lluvia Drummond,     Message text

## 2021-04-02 NOTE — TELEPHONE ENCOUNTER
RN relayed providers advisement regarding oxycodone. Pt asking for small refill to taper off oxycodone through the weekend as she took her last tablet this morning.    RN routing to provider for advisement.    Gilma Pinedo RN on 4/2/2021 at 7:57 AM

## 2021-04-07 ENCOUNTER — OFFICE VISIT (OUTPATIENT)
Dept: OBGYN | Facility: CLINIC | Age: 41
End: 2021-04-07
Payer: COMMERCIAL

## 2021-04-07 ENCOUNTER — MYC MEDICAL ADVICE (OUTPATIENT)
Dept: PALLIATIVE MEDICINE | Facility: CLINIC | Age: 41
End: 2021-04-07

## 2021-04-07 VITALS
SYSTOLIC BLOOD PRESSURE: 104 MMHG | TEMPERATURE: 98.9 F | HEART RATE: 101 BPM | BODY MASS INDEX: 24.16 KG/M2 | DIASTOLIC BLOOD PRESSURE: 76 MMHG | WEIGHT: 132.1 LBS

## 2021-04-07 DIAGNOSIS — Z98.890 POSTOPERATIVE STATE: Primary | ICD-10-CM

## 2021-04-07 DIAGNOSIS — R10.2 PELVIC PAIN IN FEMALE: ICD-10-CM

## 2021-04-07 LAB
ALBUMIN UR-MCNC: NEGATIVE MG/DL
APPEARANCE UR: CLEAR
BACTERIA #/AREA URNS HPF: ABNORMAL /HPF
BILIRUB UR QL STRIP: NEGATIVE
COLOR UR AUTO: ABNORMAL
GLUCOSE UR STRIP-MCNC: NEGATIVE MG/DL
HGB UR QL STRIP: ABNORMAL
KETONES UR STRIP-MCNC: NEGATIVE MG/DL
LEUKOCYTE ESTERASE UR QL STRIP: NEGATIVE
MUCOUS THREADS #/AREA URNS LPF: PRESENT /LPF
NITRATE UR QL: NEGATIVE
PH UR STRIP: 7 PH (ref 5–7)
RBC #/AREA URNS AUTO: 1 /HPF (ref 0–2)
SOURCE: ABNORMAL
SP GR UR STRIP: 1.01 (ref 1–1.03)
SPECIMEN SOURCE: NORMAL
SQUAMOUS #/AREA URNS AUTO: 4 /HPF (ref 0–1)
UROBILINOGEN UR STRIP-MCNC: 0 MG/DL (ref 0–2)
WBC #/AREA URNS AUTO: 1 /HPF (ref 0–5)
WET PREP SPEC: NORMAL

## 2021-04-07 PROCEDURE — 87210 SMEAR WET MOUNT SALINE/INK: CPT | Performed by: OBSTETRICS & GYNECOLOGY

## 2021-04-07 PROCEDURE — 81001 URINALYSIS AUTO W/SCOPE: CPT | Performed by: OBSTETRICS & GYNECOLOGY

## 2021-04-07 PROCEDURE — 99024 POSTOP FOLLOW-UP VISIT: CPT | Performed by: OBSTETRICS & GYNECOLOGY

## 2021-04-07 NOTE — PROGRESS NOTES
SUBJECTIVE:       HPI: Nicole Phoenix is a 41 year old  is s/p Total laparoscopic hysterectomy with bilateral salpingectomy on 3/24/21 for Heavy menstrual bleeding non responsive to hormonal suppression, with Dr. Beasley. Since surgery, she has been doing well. Incisional pain has improved over the last few days. Some mild intermittent pelvic cramping, no vaginal discharge. She has not vaginal bleeding. She has not been sexually active. She denies fevers/chills, cp/spb, n/v, dysuria, constipation or diarrhea.        Ob Hx:    Gyn Hx: Patient's last menstrual period was 03/10/2021.     Last pap was 2018 NIL, HPV neg. Next pap due NA         Today's PHQ-2 Score:   PHQ-2 (  [employer]) 2018   Q1: Little interest or pleasure in doing things 1   Q2: Feeling down, depressed or hopeless 1   PHQ-2 Score 2   Q1: Little interest or pleasure in doing things -   Q2: Feeling down, depressed or hopeless -   PHQ-2 Score -     Today's PHQ-9 Score:   PHQ-9 SCORE 3/19/2021   PHQ-9 Total Score MyChart -   PHQ-9 Total Score 8     Today's RAYMOND-7 Score:   RAYMOND-7 SCORE 2020   Total Score 4       Problem list and histories reviewed & adjusted, as indicated.  Additional history: as documented.    Patient Active Problem List   Diagnosis     Fibromyalgia     Intractable chronic migraine without aura and without status migrainosus     Syncope, unspecified syncope type     Neck muscle spasm     Cervicalgia     Cervical high risk HPV (human papillomavirus) test positive     Contact dermatitis due to poison ivy     Moderate recurrent major depression (H), with anxious distress     Abnormal uterine bleeding (AUB)     Past Surgical History:   Procedure Laterality Date     C  DELIVERY+POSTPARTUM CARE       HC REMOVAL OF TONSILS,<13 Y/O       HC REPAIR EXTEN TENDON FINGER W/O GRAFT, EACH  03/15/2009    right ring finger     LAPAROSCOPIC HYSTERECTOMY TOTAL, SALPINGECTOMY BILATERAL N/A 3/24/2021    Procedure:  HYSTERECTOMY, TOTAL, LAPAROSCOPIC, WITH BILATERAL SALPINGECTOMY;  Surgeon: Ivan Beasley MD;  Location: [facility] OR     TUBAL LIGATION  2005      Social History     Tobacco Use     Smoking status: Current Some Day Smoker     Packs/day: 0.10     Years: 4.00     Pack years: 0.40     Types: Cigarettes     Smokeless tobacco: Never Used   Substance Use Topics     Alcohol use: No      Problem (# of Occurrences) Relation (Name,Age of Onset)    Alcohol/Drug (1) Maternal Grandfather: recovering alcoholic    Breast Cancer (1) Maternal Grandmother (45)    Cerebrovascular Disease (1) Paternal Grandmother    Diabetes (1) Paternal Grandmother            butalbital-acetaminophen-caffeine (ESGIC) -40 MG tablet, Take 1 tablet by mouth daily as needed for headaches  ibuprofen (ADVIL/MOTRIN) 800 MG tablet, Take 1 tablet (800 mg) by mouth every 6 hours as needed for other (mild and/or inflammatory pain)  ondansetron (ZOFRAN) 4 MG tablet, Take 1 tablet (4 mg) by mouth every 8 hours as needed for nausea  ondansetron (ZOFRAN-ODT) 4 MG ODT tab, Take 1-2 tablets (4-8 mg) by mouth every 8 hours as needed for nausea  oxyCODONE (ROXICODONE) 5 MG tablet, Take 1-2 tablets (5-10 mg) by mouth every 4 hours as needed for moderate to severe pain  polyethylene glycol (MIRALAX) 17 GM/Dose powder, Take 17 g (1 capful) by mouth daily  pregabalin (LYRICA) 25 MG capsule, Take 1 capsule (25 mg) by mouth 3 times daily  rizatriptan (MAXALT) 10 MG tablet, Take 1 tablet (10 mg) by mouth at onset of headache for migraine (OK to retake one tablet two hours after first if not relief)  tiZANidine (ZANAFLEX) 4 MG tablet, Take 1 tablet (4 mg) by mouth 3 times daily as needed for muscle spasms  traMADol (ULTRAM) 50 MG tablet, Take 0.5-1 tablets (25-50 mg) by mouth daily as needed for severe pain Ok to fill 1/18/21 and start 1/20/21, To last 1 month  venlafaxine (EFFEXOR-XR) 75 MG 24 hr capsule, Take 3 capsules (225 mg) by mouth daily    No current  facility-administered medications on file prior to visit.     Allergies   Allergen Reactions     Parafon Forte Dsc [Chlorzoxazone] Hives     Poison Ivy Extract [Extract Of Poison Ivy]        ROS:  10 Point review of systems negative other noted above in HPI    OBJECTIVE:     /76   Pulse 101   Temp 98.9  F (37.2  C) (Temporal)   Wt 59.9 kg (132 lb 1.6 oz)   LMP 03/10/2021   BMI 24.16 kg/m    Body mass index is 24.16 kg/m .      Gen: Alert, oriented, appropriately interactive, NAD  Chest: Symmetrical, unlabored breathing  Abdomen: soft, non tender, non distended, no masses, no hernias.  Incisions c/d/i  Pelvic: Deferred   MSK: normal gait, symmetric movements UE & LE  Lower extremities: non-tender, no edema      In-Clinic Test Results:  No results found for this or any previous visit (from the past 24 hour(s)).   Pathology:  SPECIMEN(S):   Uterus, cervix, and bilateral fallopian tubes     FINAL DIAGNOSIS:   Uterus with bilateral fallopian tubes, hysterectomy with bilateral   salpingectomy:   -Normal cervix and endocervix   -Normal proliferative pattern endometrium   -Adenomyosis of myometrium   -Normal bilateral fallopian tubes     Electronically signed out by:     Petra Andrew M.D.     ASSESSMENT/PLAN:                                                      Nicole Phoenix is a 41 year old  s/p Total laparoscopic hysterectomy with bilateral salpingectomy on 3/24/21 for Heavy menstrual bleeding non responsive to hormonal suppression, final pathology benign      ICD-10-CM    1. Postoperative state  Z98.890    2. Pelvic pain in female  R10.2 *UA reflex to Microscopic and Culture ([place]; [facility]; [facility]; [facility]; [facility]; [facility]; [place]; [place])     Wet prep       Doing well postoperatively, however, some mild pelvic cramping. Pelvic deferred today however, if symptoms worsen or persist, will recommend in future. Patient to self-collect wet prep and leave UA to  rule out infection.. No vaginal bleeding or abnormal discharge since surgery. No sexually active.    Continue post-operative restrictions, including pelvic rest and lifting until 6 week visit.    Follow-up in 4 weeks for vaginal cuff check. Concerning s/s reviewed      Lluvia Drummond DO  [facility]

## 2021-04-08 NOTE — TELEPHONE ENCOUNTER
Writer attempted to call pt, No answer.  LVM for Pt to call writer back at [phone].    Glenn Hook, RN  Care Coordinator   [facility] [place]

## 2021-04-08 NOTE — TELEPHONE ENCOUNTER
Please inform patient that this can be done at any [place] location that has a lab.  If patient would like to be seen in person we can also set up an appointment in both can be done that day.

## 2021-04-08 NOTE — TELEPHONE ENCOUNTER
From: Nicole ALIRIO Phoenix      Created: 4/7/2021 12:06 PM        *-*-*This message has not been handled.*-*-*    I am just following up regarding getting a contract signed and providing a UA. [place] is about 1.5hr away, so if I need to come there I would rather set up a visit as well.... not sure when I am due for my next medication review follow up.  Thank you, Nicole        Pt needs to update her UDS and CSA.  Are you ok with Pt waiting until her next office visit?  Last office virtual visit was 2/22/21.    Pt has not had a UDS or completed a CSA in the past.    Glenn Hook, RN  Care Coordinator   [facility]

## 2021-04-09 ENCOUNTER — MYC MEDICAL ADVICE (OUTPATIENT)
Dept: OBGYN | Facility: CLINIC | Age: 41
End: 2021-04-09

## 2021-04-09 DIAGNOSIS — G89.18 ACUTE POST-OPERATIVE PAIN: Primary | ICD-10-CM

## 2021-04-09 NOTE — TELEPHONE ENCOUNTER
Lluvia Drummond, DO  You 8 minutes ago (10:19 AM)     Before prescribing further medications, I would strongly recommend that this patient have imaging to rule out of etiologies for her pain. Can she come in today for a pelvic ultrasound? If the pain becomes unbearable, then she will likely need to be seen in the ER.     Lluvia Drummond, DO

## 2021-04-13 ENCOUNTER — MYC MEDICAL ADVICE (OUTPATIENT)
Dept: PALLIATIVE MEDICINE | Facility: CLINIC | Age: 41
End: 2021-04-13

## 2021-04-13 DIAGNOSIS — M79.18 MYOFASCIAL MUSCLE PAIN: ICD-10-CM

## 2021-04-13 DIAGNOSIS — G43.109 MIGRAINE WITH AURA AND WITHOUT STATUS MIGRAINOSUS, NOT INTRACTABLE: ICD-10-CM

## 2021-04-13 NOTE — TELEPHONE ENCOUNTER
See the todays mychart encounter for more information.    Olinda He RN-BSN  [facility]

## 2021-04-13 NOTE — TELEPHONE ENCOUNTER
See the todays mychart encounter for more information. Pt is going to do urine drug screen at the [facility] location.    Olinda He RN-BSN  [facility]

## 2021-04-13 NOTE — TELEPHONE ENCOUNTER
Received request from patient requesting refill(s) for butalbital-acetaminophen-caffeine (ESGIC) -40 MG tablet and tiZANidine (ZANAFLEX) 4 MG tablet      Pt last seen on 02/22/21  Next appt scheduled for : none    Will facilitate refill.

## 2021-04-13 NOTE — TELEPHONE ENCOUNTER
Pt requesting refills of fiornal and tizanidine.        Per patient [employer] message:  From: Nicole Phoenix      Created: 4/13/2021 2:15 PM      Hi, I just called the pharmacy and they said they have not heard back regarding the 2 med refill requests. I have a horrible headache and over the counter medications are not working. Can you have Dr. Arias send the renewal over as soon as he can so my daughter can go pick it up.   Thank you so much

## 2021-04-14 RX ORDER — BUTALBITAL, ACETAMINOPHEN AND CAFFEINE 50; 325; 40 MG/1; MG/1; MG/1
1 TABLET ORAL DAILY PRN
Qty: 10 TABLET | Refills: 0 | Status: SHIPPED | OUTPATIENT
Start: 2021-04-14 | End: 2021-04-23

## 2021-04-19 ENCOUNTER — MYC MEDICAL ADVICE (OUTPATIENT)
Dept: PALLIATIVE MEDICINE | Facility: CLINIC | Age: 41
End: 2021-04-19

## 2021-04-19 NOTE — TELEPHONE ENCOUNTER
From: Nicole Phoenix      Created: 4/19/2021 9:39 AM        *-*-*This message has not been handled.*-*-*    Good morning, I have decided that since I have gone this long without needing my tramadol that I do not think it is necessary to have a contract.y pain had been well managed with my current medications. Although being without my Tinazidine for a week made my neck spasms horrible, which brought on a 4 day headache followed by a migraine, so I realize how very important it is for me to take that daily. Please let me know when I am due for my next appointment.  Thank you, Nicole Hatch is on Fiorinal, is a contract and every 3 month visits needed for this.    Glenn Hook, RN  Care Coordinator   [facility]

## 2021-04-22 ENCOUNTER — MYC MEDICAL ADVICE (OUTPATIENT)
Dept: PALLIATIVE MEDICINE | Facility: CLINIC | Age: 41
End: 2021-04-22

## 2021-04-22 ENCOUNTER — MYC REFILL (OUTPATIENT)
Dept: PALLIATIVE MEDICINE | Facility: CLINIC | Age: 41
End: 2021-04-22

## 2021-04-22 DIAGNOSIS — G43.109 MIGRAINE WITH AURA AND WITHOUT STATUS MIGRAINOSUS, NOT INTRACTABLE: ICD-10-CM

## 2021-04-22 DIAGNOSIS — M79.18 MYOFASCIAL MUSCLE PAIN: ICD-10-CM

## 2021-04-22 RX ORDER — BUTALBITAL, ACETAMINOPHEN AND CAFFEINE 50; 325; 40 MG/1; MG/1; MG/1
1 TABLET ORAL DAILY PRN
Qty: 10 TABLET | Refills: 0 | Status: CANCELLED | OUTPATIENT
Start: 2021-04-22

## 2021-04-22 NOTE — TELEPHONE ENCOUNTER
Refills have been requested for the following medications:         butalbital-acetaminophen-caffeine (ESGIC) -40 MG tablet [John Arias MD]     Preferred pharmacy: [address]

## 2021-04-23 RX ORDER — BUTALBITAL, ACETAMINOPHEN AND CAFFEINE 50; 325; 40 MG/1; MG/1; MG/1
1 TABLET ORAL DAILY PRN
Qty: 10 TABLET | Refills: 0 | Status: SHIPPED | OUTPATIENT
Start: 2021-04-23 | End: 2021-05-07

## 2021-04-23 NOTE — TELEPHONE ENCOUNTER
Patient requesting refill(s) ofbutalbital-acetaminophen-caffeine (ESGIC) -40 MG tablet     Pending Prescriptions:                       Disp   Refills    butalbital-acetaminophen-caffeine (ESGIC)*10 tab*0            Sig: Take 1 tablet by mouth daily as needed for           headaches    Last refill 04/14/2021  Last office visit 02/22/2021  Next appointment None     Pieter Glasgow MA

## 2021-04-23 NOTE — TELEPHONE ENCOUNTER
Message sent to pt:    Yeyo Rivera,     We received a message from the pharmacy that Esgic was filled on 4/14/21. Typically this medication isn't filled this frequently so we are wondering if there is some confusion or if there is something more we should know about what is going on with you.     Take care,     SHAQUILLE HeadN, RN-BC  Patient Care Supervisor  [facility] [place]

## 2021-04-23 NOTE — TELEPHONE ENCOUNTER
Will forward to MA and Nurse pool to process refill.    Glenn Hook, RN  Care Coordinator   [facility] [place]

## 2021-04-23 NOTE — TELEPHONE ENCOUNTER
Received [employer] message from patient requesting refill(s) for butalbital-acetaminophen-caffeine (ESGIC) -40 MG tablet     Last refilled on 4/14/21    Pt last seen on 2/22/21  Next appt scheduled for none    E-prescribe to:    [facility] [address]      Routing to RN medication was filled 9 days ago.

## 2021-04-28 NOTE — TELEPHONE ENCOUNTER
[employer] message from patient on 4/28 at 1003:    I sent a message regarding this when I requested the medication. I stated that I have been having more frequent headaches, and needing to take the fiorecet quite often to relieve them. I wanted this reported to Dr. Arias so he was aware and to go over some other chronic headache interventions.   Nicole  ------------  Reviewed chart. Pt sent this refill request an another [employer] message as well. The refill was completed in the [place] message so this one is not needed.     Message sent to pt:    Mark Rivera,     I see that the prescription you requested in this message was processed in the other [employer] encounter that you sent so we need to cancel this request. You will get a denial message when I do that but that is just because the request was duplicated. Since you haven't had a visit since February and would like to discuss other options, it would be best for you to make an appointment. Please call the main clinic number at [phone] to make another video visit with Dr. Arias.     Take nathan,     Marii Arroyo, SHAQUILLEN, RN-BC  Patient Care Supervisor  [facility]

## 2021-05-03 ENCOUNTER — OFFICE VISIT (OUTPATIENT)
Dept: FAMILY MEDICINE | Facility: CLINIC | Age: 41
End: 2021-05-03
Payer: COMMERCIAL

## 2021-05-03 VITALS
SYSTOLIC BLOOD PRESSURE: 116 MMHG | TEMPERATURE: 97.9 F | DIASTOLIC BLOOD PRESSURE: 80 MMHG | BODY MASS INDEX: 24.11 KG/M2 | HEART RATE: 72 BPM | WEIGHT: 131.8 LBS | RESPIRATION RATE: 16 BRPM

## 2021-05-03 DIAGNOSIS — R53.83 FATIGUE, UNSPECIFIED TYPE: ICD-10-CM

## 2021-05-03 DIAGNOSIS — Z90.710 S/P HYSTERECTOMY: ICD-10-CM

## 2021-05-03 DIAGNOSIS — F33.2 SEVERE EPISODE OF RECURRENT MAJOR DEPRESSIVE DISORDER, WITHOUT PSYCHOTIC FEATURES (H): Primary | ICD-10-CM

## 2021-05-03 LAB
DEPRECATED CALCIDIOL+CALCIFEROL SERPL-MC: 20 UG/L (ref 20–75)
TSH SERPL DL<=0.005 MIU/L-ACNC: 1.36 MU/L (ref 0.4–4)
VIT B12 SERPL-MCNC: 666 PG/ML (ref 193–986)

## 2021-05-03 PROCEDURE — 99214 OFFICE O/P EST MOD 30 MIN: CPT | Performed by: PHYSICIAN ASSISTANT

## 2021-05-03 PROCEDURE — 82306 VITAMIN D 25 HYDROXY: CPT | Performed by: PHYSICIAN ASSISTANT

## 2021-05-03 PROCEDURE — 84443 ASSAY THYROID STIM HORMONE: CPT | Performed by: PHYSICIAN ASSISTANT

## 2021-05-03 PROCEDURE — 36415 COLL VENOUS BLD VENIPUNCTURE: CPT | Performed by: PHYSICIAN ASSISTANT

## 2021-05-03 PROCEDURE — 82607 VITAMIN B-12: CPT | Performed by: PHYSICIAN ASSISTANT

## 2021-05-03 RX ORDER — VENLAFAXINE HYDROCHLORIDE 75 MG/1
CAPSULE, EXTENDED RELEASE ORAL
Qty: 45 CAPSULE | Refills: 0 | Status: SHIPPED | OUTPATIENT
Start: 2021-05-03 | End: 2021-06-10

## 2021-05-03 RX ORDER — HYDROXYZINE HYDROCHLORIDE 10 MG/1
10 TABLET, FILM COATED ORAL 3 TIMES DAILY PRN
Qty: 30 TABLET | Refills: 1 | Status: SHIPPED | OUTPATIENT
Start: 2021-05-03 | End: 2021-11-09

## 2021-05-03 ASSESSMENT — ANXIETY QUESTIONNAIRES
1. FEELING NERVOUS, ANXIOUS, OR ON EDGE: NOT AT ALL
GAD7 TOTAL SCORE: 0
2. NOT BEING ABLE TO STOP OR CONTROL WORRYING: NOT AT ALL
5. BEING SO RESTLESS THAT IT IS HARD TO SIT STILL: NOT AT ALL
6. BECOMING EASILY ANNOYED OR IRRITABLE: NOT AT ALL
7. FEELING AFRAID AS IF SOMETHING AWFUL MIGHT HAPPEN: NOT AT ALL
3. WORRYING TOO MUCH ABOUT DIFFERENT THINGS: NOT AT ALL

## 2021-05-03 ASSESSMENT — PAIN SCALES - GENERAL: PAINLEVEL: NO PAIN (0)

## 2021-05-03 ASSESSMENT — PATIENT HEALTH QUESTIONNAIRE - PHQ9
SUM OF ALL RESPONSES TO PHQ QUESTIONS 1-9: 9
5. POOR APPETITE OR OVEREATING: NOT AT ALL

## 2021-05-03 NOTE — PROGRESS NOTES
Assessment & Plan     Severe episode of recurrent major depressive disorder, without psychotic features (H)  Patient is motivated to taper off of her venlafaxine and has started doing so by taking 150mg daily for the past month. She would like to continue this taper as she feels that her symptoms have remained stable. She does notice a small burst of anxiety which is evidence by chest flutter on occasion and says that this improves with taking her regular dose. She will have hydroxyzine to use during these times. Taper directions have been continued to taper down to 75mg daily. Once this has been completed patient will call for Rx of 37.5mg and taper will continue. If symptoms occur at any point she will call to update.   - venlafaxine (EFFEXOR-XR) 75 MG 24 hr capsule; 75mg daily every other day with 150mg daily on the off days for 2 weeks, then 75mg daily for additional 2 weeks  - hydrOXYzine (ATARAX) 10 MG tablet; Take 1 tablet (10 mg) by mouth 3 times daily as needed for anxiety    Fatigue, unspecified type  S/P hysterectomy  Patient has been following with gynecology following her procedure, most recent visit on 04/07/2021. Review of this note shows that she was felt to be doing well since the procedure. Patient says that she feels that since the procedure she has been feeling more fatigued. She denies weakness, inability to carry out daily activities, changes to vision or hearing, abdominal upset or changes to bowel or bladder. Recent lab workup in March 2021 which was normal. Will update additional labs to check for alternative causes. Pending this consider consult with sleep specialist.   - Vitamin B12  - Vitamin D Deficiency  - TSH with free T4 reflex     Return in about 6 months (around 11/3/2021) for Return for scheduled annual checkup with PCP.    RAPHAEL Lozano [facility] RANCHO Rivera is a 41 year old who presents for the following health issues   HPI      Depression Followup    How are you doing with your depression since your last visit? Improved     Are you having other symptoms that might be associated with depression? No    Have you had a significant life event?  Grief or Loss     Are you feeling anxious or having panic attacks?   No    Do you have any concerns with your use of alcohol or other drugs? No    Social History     Tobacco Use     Smoking status: Current Some Day Smoker     Packs/day: 0.10     Years: 4.00     Pack years: 0.40     Types: Cigarettes     Smokeless tobacco: Never Used   Substance Use Topics     Alcohol use: No     Drug use: No     PHQ 4/2/2020 4/24/2020 3/19/2021   PHQ-9 Total Score 14 4 8   Q9: Thoughts of better off dead/self-harm past 2 weeks Several days Not at all Not at all     RAYMOND-7 SCORE 3/8/2018 4/2/2020 4/24/2020   Total Score 9 11 4     Last PHQ-9 3/19/2021   1.  Little interest or pleasure in doing things 1   2.  Feeling down, depressed, or hopeless 0   3.  Trouble falling or staying asleep, or sleeping too much 0   4.  Feeling tired or having little energy 3   5.  Poor appetite or overeating 0   6.  Feeling bad about yourself 0   7.  Trouble concentrating 3   8.  Moving slowly or restless 1   Q9: Thoughts of better off dead/self-harm past 2 weeks 0   PHQ-9 Total Score 8   Difficulty at work, home, or with people Very difficult     RAYMOND-7  4/24/2020   1. Feeling nervous, anxious, or on edge 1   2. Not being able to stop or control worrying 1   3. Worrying too much about different things 1   4. Trouble relaxing 1   5. Being so restless that it is hard to sit still 0   6. Becoming easily annoyed or irritable 0   7. Feeling afraid, as if something awful might happen 0   RAYMOND-7 Total Score 4   If you checked any problems, how difficult have they made it for you to do your work, take care of things at home, or get along with other people? Somewhat difficult       Suicide Assessment Five-step Evaluation and Treatment  (SAFE-T)      How many servings of fruits and vegetables do you eat daily?  2-3    On average, how many sweetened beverages do you drink each day (Examples: soda, juice, sweet tea, etc.  Do NOT count diet or artificially sweetened beverages)?   1-2    How many days per week do you exercise enough to make your heart beat faster? none    How many minutes a day do you exercise enough to make your heart beat faster? none    How many days per week do you miss taking your medication? 0    Patient is a 41 year old female who presents today to discuss further taper off of her effexor as well as concerns about fatigue and memory issues. The patient has tapered herself from 225mg to 150mg daily on her effexor without adverse effect. We discussed slow taper from 150mg to 75mg over the next month. She was agreeable to this. Denies worsening symptoms. Reviewed symptoms of discontinuation. Patient says that she has been feeling fatigued off/on for past half year, but more so since her recent hysterectomy. She says that her children are commenting on how absent minded she is. She informs me that she will sleep for 9 hours and wake feeling tired. No associated symptoms, no changes to diet or activity. Discussed with patient that discontinuing a medication such as effexor may result in some drowsiness as her depressive symptoms may worsen. Patient does not feel that this is the case. We will check for alternative causes for her symptoms today.     Review of Systems   Constitutional, HEENT, cardiovascular, pulmonary, gi and gu systems are negative, except as otherwise noted.      Objective    /80 (BP Location: Right arm, Patient Position: Chair, Cuff Size: Adult Large)   Pulse 72   Temp 97.9  F (36.6  C) (Temporal)   Resp 16   Wt 59.8 kg (131 lb 12.8 oz)   LMP 03/10/2021   BMI 24.11 kg/m    Body mass index is 24.11 kg/m .  Physical Exam   GENERAL: healthy, alert and no distress  EYES: Eyes grossly normal to inspection,  PERRL and conjunctivae and sclerae normal  HENT: ear canals and TM's normal, nose and mouth without ulcers or lesions  NECK: no adenopathy, no asymmetry, masses, or scars and thyroid normal to palpation  RESP: lungs clear to auscultation - no rales, rhonchi or wheezes  CV: regular rate and rhythm, normal S1 S2, no S3 or S4, no murmur, click or rub, no peripheral edema and peripheral pulses strong  ABDOMEN: soft, nontender, no hepatosplenomegaly, no masses and bowel sounds normal  MS: no gross musculoskeletal defects noted, no edema  NEURO: Normal strength and tone, mentation intact and speech normal  PSYCH: mentation appears normal, affect normal/bright    Results for orders placed or performed in visit on 05/03/21 (from the past 24 hour(s))   Vitamin B12   Result Value Ref Range    Vitamin B12 666 193 - 986 pg/mL   TSH with free T4 reflex   Result Value Ref Range    TSH 1.36 0.40 - 4.00 mU/L

## 2021-05-03 NOTE — NURSING NOTE
Health Maintenance Due   Topic Date Due     ADVANCE CARE PLANNING  Never done     Pneumococcal Vaccine: Pediatrics (0 to 5 Years) and At-Risk Patients (6 to 64 Years) (1 of 2 - PPSV23) Never done     COVID-19 Vaccine (1) Never done     HEPATITIS C SCREENING  Never done     DTAP/TDAP/TD IMMUNIZATION (2 - Td) 03/15/2019     PREVENTIVE CARE VISIT  04/05/2019     HPV TEST  04/05/2021     PAP  04/05/2021     Abeba YA LPN

## 2021-05-04 DIAGNOSIS — R53.83 FATIGUE, UNSPECIFIED TYPE: Primary | ICD-10-CM

## 2021-05-04 ASSESSMENT — ANXIETY QUESTIONNAIRES: GAD7 TOTAL SCORE: 0

## 2021-05-07 ENCOUNTER — MYC MEDICAL ADVICE (OUTPATIENT)
Dept: PALLIATIVE MEDICINE | Facility: CLINIC | Age: 41
End: 2021-05-07

## 2021-05-07 ENCOUNTER — MYC REFILL (OUTPATIENT)
Dept: PALLIATIVE MEDICINE | Facility: CLINIC | Age: 41
End: 2021-05-07

## 2021-05-07 DIAGNOSIS — M79.18 MYOFASCIAL MUSCLE PAIN: ICD-10-CM

## 2021-05-07 DIAGNOSIS — G43.109 MIGRAINE WITH AURA AND WITHOUT STATUS MIGRAINOSUS, NOT INTRACTABLE: ICD-10-CM

## 2021-05-07 RX ORDER — BUTALBITAL, ACETAMINOPHEN AND CAFFEINE 50; 325; 40 MG/1; MG/1; MG/1
1 TABLET ORAL DAILY PRN
Qty: 10 TABLET | Refills: 0 | Status: SHIPPED | OUTPATIENT
Start: 2021-05-07 | End: 2021-06-16

## 2021-05-07 NOTE — TELEPHONE ENCOUNTER
Refills have been requested for the following medications:         butalbital-acetaminophen-caffeine (ESGIC) -40 MG tablet [John Arias MD]     Preferred pharmacy: [id number] - [address]

## 2021-05-07 NOTE — TELEPHONE ENCOUNTER
Received UrtheCasthart message from patient requesting refill(s) for butalbital-acetaminophen-caffeine (ESGIC) -40 MG tablet     Last refilled on 4/32/21    Pt last seen on 2/22/21  Next appt scheduled for none    E-prescribe to:     [id number] - [place], [place] - [address] [place] [address]     Will facilitate refill.

## 2021-05-11 ENCOUNTER — OFFICE VISIT (OUTPATIENT)
Dept: OBGYN | Facility: CLINIC | Age: 41
End: 2021-05-11
Payer: COMMERCIAL

## 2021-05-11 VITALS
HEART RATE: 70 BPM | BODY MASS INDEX: 24.07 KG/M2 | WEIGHT: 131.6 LBS | TEMPERATURE: 98.4 F | SYSTOLIC BLOOD PRESSURE: 110 MMHG | DIASTOLIC BLOOD PRESSURE: 70 MMHG

## 2021-05-11 DIAGNOSIS — Z98.890 POSTOPERATIVE STATE: Primary | ICD-10-CM

## 2021-05-11 PROCEDURE — 99024 POSTOP FOLLOW-UP VISIT: CPT | Performed by: OBSTETRICS & GYNECOLOGY

## 2021-05-11 NOTE — PROGRESS NOTES
SUBJECTIVE:       HPI: Nicole [place] is a 41 year old  is s/p Total laparoscopic hysterectomy with bilateral salpingectomy on 3/24/21 for Heavy menstrual bleeding non responsive to hormonal suppression, with Dr. Beasley. Since surgery, she has been doing well.  Some mild intermittent pelvic cramping, no vaginal discharge. 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 She had one episode of sexual intercourse 21, which felt uncomfortable for her. She has not had vaginal bleeding. She denies fevers/chills, cp/spb, n/v, dysuria, constipation or diarrhea.      Ob Hx:    Gyn Hx: Patient's last menstrual period was 03/10/2021.     Last pap was 2018 NIL, HPV neg. Next pap due NA     Today's PHQ-2 Score:   PHQ-2 (  [employer]) 2018   Q1: Little interest or pleasure in doing things 1   Q2: Feeling down, depressed or hopeless 1   PHQ-2 Score 2   Q1: Little interest or pleasure in doing things -   Q2: Feeling down, depressed or hopeless -   PHQ-2 Score -     Today's PHQ-9 Score:   PHQ-9 SCORE 5/3/2021   PHQ-9 Total Score MyChart -   PHQ-9 Total Score 9     Today's RAYMOND-7 Score:   RAYMOND-7 SCORE 5/3/2021   Total Score 0       Problem list and histories reviewed & adjusted, as indicated.  Additional history: as documented.    Patient Active Problem List   Diagnosis     Fibromyalgia     Intractable chronic migraine without aura and without status migrainosus     Syncope, unspecified syncope type     Neck muscle spasm     Cervicalgia     Cervical high risk HPV (human papillomavirus) test positive     Contact dermatitis due to poison ivy     Moderate recurrent major depression (H), with anxious distress     Abnormal uterine bleeding (AUB)     Past Surgical History:   Procedure Laterality Date     C  DELIVERY+POSTPARTUM CARE       HC REMOVAL OF TONSILS,<11 Y/O       HC REPAIR EXTEN TENDON FINGER W/O GRAFT, EACH  03/15/2009    right ring finger      LAPAROSCOPIC HYSTERECTOMY TOTAL, SALPINGECTOMY BILATERAL N/A 3/24/2021    Procedure: HYSTERECTOMY, TOTAL, LAPAROSCOPIC, WITH BILATERAL SALPINGECTOMY;  Surgeon: Ivan Beasley MD;  Location: [facility]     TUBAL LIGATION  2005      Social History     Tobacco Use     Smoking status: Current Some Day Smoker     Packs/day: 0.10     Years: 4.00     Pack years: 0.40     Types: Cigarettes     Smokeless tobacco: Never Used   Substance Use Topics     Alcohol use: No      Problem (# of Occurrences) Relation (Name,Age of Onset)    Alcohol/Drug (1) Maternal Grandfather: recovering alcoholic    Breast Cancer (1) Maternal Grandmother (45)    Cerebrovascular Disease (1) Paternal Grandmother    Diabetes (1) Paternal Grandmother            butalbital-acetaminophen-caffeine (ESGIC) -40 MG tablet, Take 1 tablet by mouth daily as needed for headaches  hydrOXYzine (ATARAX) 10 MG tablet, Take 1 tablet (10 mg) by mouth 3 times daily as needed for anxiety  pregabalin (LYRICA) 25 MG capsule, Take 1 capsule (25 mg) by mouth 3 times daily  rizatriptan (MAXALT) 10 MG tablet, Take 1 tablet (10 mg) by mouth at onset of headache for migraine (OK to retake one tablet two hours after first if not relief)  tiZANidine (ZANAFLEX) 4 MG tablet, Take 1 tablet (4 mg) by mouth 3 times daily as needed for muscle spasms  venlafaxine (EFFEXOR-XR) 75 MG 24 hr capsule, 75mg daily every other day with 150mg daily on the off days for 2 weeks, then 75mg daily for additional 2 weeks  ondansetron (ZOFRAN) 4 MG tablet, Take 1 tablet (4 mg) by mouth every 8 hours as needed for nausea (Patient not taking: Reported on 5/3/2021)  polyethylene glycol (MIRALAX) 17 GM/Dose powder, Take 17 g (1 capful) by mouth daily (Patient not taking: Reported on 5/3/2021)    No current facility-administered medications on file prior to visit.     Allergies   Allergen Reactions     Parafon Forte Dsc [Chlorzoxazone] Hives     Poison Ivy Extract [Extract Of Poison Ivy]       ROS:  10 Point review of systems negative other noted above in HPI    OBJECTIVE:     /70 (BP Location: Right arm, Patient Position: Chair, Cuff Size: Adult Regular)   Pulse 70   Temp 98.4  F (36.9  C) (Temporal)   Wt 59.7 kg (131 lb 9.6 oz)   LMP 03/10/2021   BMI 24.07 kg/m    Body mass index is 24.07 kg/m .    Gen: Alert, oriented, appropriately interactive, NAD  Chest: Symmetrical, unlabored breathing  Abdomen: soft, non tender, non distended, no masses, no hernias. No inguinal lymphadenopathy.   External genitalia: no lesions; normal appearing external genitalia, bartholins glands, urethra, skenes glands  Vagina: no masses or lesions or discharge, normally rugated. Vaginal cuff intact, without masses, bleeding or discharge; suture remnant cut and removed without any complication at vaginal cuff; healing.  Cervix: Surgically absent   Bimanual exam:   Nontender pelvic floor muscles  Urethra: nontender   Bladder: nontender and without massess, well supported   Uterus: Surgically absent  Adnexa: No masses or fullness. Nontender  MSK: normal gait, symmetric movements UE & LE  Lower extremities: non-tender, no edema    In-Clinic Test Results:  No results found for this or any previous visit (from the past 24 hour(s)).   Pathology:  SPECIMEN(S):   Uterus, cervix, and bilateral fallopian tubes     FINAL DIAGNOSIS:   Uterus with bilateral fallopian tubes, hysterectomy with bilateral   salpingectomy:   -Normal cervix and endocervix   -Normal proliferative pattern endometrium   -Adenomyosis of myometrium   -Normal bilateral fallopian tubes     Electronically signed out by:     Petra Andrew M.D.     ASSESSMENT/PLAN:                                                      Nicole Phoenix is a 41 year old  s/p Total laparoscopic hysterectomy with bilateral salpingectomy on 3/24/21 for Heavy menstrual bleeding non responsive to hormonal suppression, final pathology benign.      ICD-10-CM    1. Postoperative  state  Z98.890      Doing well postoperatively.   Post-op restrictions lifted.     Follow-up as needed to gynecology; f/u with primary care for routine medical care. Screening pap smears no longer indicated.     This document serves as a record of the services and decisions personally performed and made by Dr. Bhanu DO.  It was created on 5/11/21 behalf by Rhona Badillo, a trained medical student.  The creation of this record is based on the provider's personal observations and the statements of the patient.     Rhona Badillo, MS4  May 11, 2021    Physician Attestation:  I was present with the student who participated in the service and in the documentation of the note. I have verified the history and personally performed the physical exam and medical decision making. I agree with the assessment and plan of care as documented in the note, and have edited to reflect my findings.      Lluvia Drummond DO  [facility]

## 2021-05-12 DIAGNOSIS — M54.12 CERVICAL RADICULOPATHY: ICD-10-CM

## 2021-05-12 NOTE — TELEPHONE ENCOUNTER
Received fax from pharmacy requesting refill(s) for pregabalin (LYRICA) 25 MG capsule     Last refilled on 4/10/21    Pt last seen on 2/22/21  Next appt scheduled for 5/24/21    E-prescribe to:     [id number] - [place], [place] - [address] [place] [address]     Will facilitate refill.

## 2021-05-13 RX ORDER — PREGABALIN 25 MG/1
25 CAPSULE ORAL 3 TIMES DAILY
Qty: 90 CAPSULE | Refills: 1 | Status: SHIPPED | OUTPATIENT
Start: 2021-05-13 | End: 2021-09-17

## 2021-05-19 ENCOUNTER — MYC MEDICAL ADVICE (OUTPATIENT)
Dept: FAMILY MEDICINE | Facility: CLINIC | Age: 41
End: 2021-05-19

## 2021-05-19 ENCOUNTER — MYC MEDICAL ADVICE (OUTPATIENT)
Dept: PALLIATIVE MEDICINE | Facility: CLINIC | Age: 41
End: 2021-05-19

## 2021-05-19 DIAGNOSIS — F33.2 SEVERE EPISODE OF RECURRENT MAJOR DEPRESSIVE DISORDER, WITHOUT PSYCHOTIC FEATURES (H): Primary | ICD-10-CM

## 2021-05-19 DIAGNOSIS — G43.109 MIGRAINE WITH AURA AND WITHOUT STATUS MIGRAINOSUS, NOT INTRACTABLE: ICD-10-CM

## 2021-05-19 NOTE — TELEPHONE ENCOUNTER
Received fax request from   [id number] - [place], [place] - [address] [place] [address] [address] [facility] [address]  Phone: [phone] Fax: [phone]    Pharmacy requesting refill(s) for rizatriptan (MAXALT) 10 MG tablet    Last refilled on 03/16/21    Pt last seen on 02/22/21    Next appt scheduled for 05/24/21    Will facilitate refill.    Madai Allen [facility] [place]

## 2021-05-20 RX ORDER — VENLAFAXINE HYDROCHLORIDE 37.5 MG/1
CAPSULE, EXTENDED RELEASE ORAL
Qty: 91 CAPSULE | Refills: 0 | Status: SHIPPED | OUTPATIENT
Start: 2021-05-20 | End: 2021-06-10

## 2021-05-20 RX ORDER — RIZATRIPTAN BENZOATE 10 MG/1
10 TABLET ORAL
Qty: 10 TABLET | Refills: 1 | Status: SHIPPED | OUTPATIENT
Start: 2021-05-20 | End: 2022-11-04

## 2021-05-24 ENCOUNTER — MYC MEDICAL ADVICE (OUTPATIENT)
Dept: PALLIATIVE MEDICINE | Facility: CLINIC | Age: 41
End: 2021-05-24

## 2021-05-24 NOTE — TELEPHONE ENCOUNTER
Per patient myChart message:  From: Nicole Phoenix      Created: 5/24/2021 7:59 AM      I have to reschedule my appt for today. My neck has been so bad the last couple days and now today it is so stiff and painful that I can barely turn it. [place] is [place] hour and [place] away and I cannot drive that far with my neck this way.   Nicole      ___________    appointment cancelled.    [facility] sent to pt:  From: Olinda He RN      Created: 5/24/2021 9:51 AM        Mark Rivera,  Sorry to hear!     When ready, you can call to reschedule [phone].     SYMONE Prakash-BSN  [facility] [place]

## 2021-06-10 ENCOUNTER — MYC MEDICAL ADVICE (OUTPATIENT)
Dept: FAMILY MEDICINE | Facility: CLINIC | Age: 41
End: 2021-06-10

## 2021-06-10 DIAGNOSIS — F33.2 SEVERE EPISODE OF RECURRENT MAJOR DEPRESSIVE DISORDER, WITHOUT PSYCHOTIC FEATURES (H): ICD-10-CM

## 2021-06-10 RX ORDER — VENLAFAXINE HYDROCHLORIDE 37.5 MG/1
112.5 CAPSULE, EXTENDED RELEASE ORAL DAILY
Qty: 91 CAPSULE | Refills: 0 | COMMUNITY
Start: 2021-06-10 | End: 2021-06-22

## 2021-06-10 NOTE — TELEPHONE ENCOUNTER
Med list updated base don MyChart report of tapering.  Closing this encounter.  Pamela Phan, SHAQUILLEN, RN

## 2021-06-15 DIAGNOSIS — G43.109 MIGRAINE WITH AURA AND WITHOUT STATUS MIGRAINOSUS, NOT INTRACTABLE: ICD-10-CM

## 2021-06-15 DIAGNOSIS — M79.18 MYOFASCIAL MUSCLE PAIN: ICD-10-CM

## 2021-06-15 NOTE — TELEPHONE ENCOUNTER
Received fax from pharmacy requesting refill(s) for butalbital-acetaminophen-caffeine (ESGIC) -40 MG tablet     Last refilled on 05/07/21    Pt last seen on 02/22/21  Next appt scheduled for None    E-prescribe to:     [id number] - [place], [place] - [address] [place] [address] PHARMACY [place], [place] - [address] [place] [address]      Will facilitate refill.      Larissa Larkin MA  [facility]

## 2021-06-16 RX ORDER — BUTALBITAL, ACETAMINOPHEN AND CAFFEINE 50; 325; 40 MG/1; MG/1; MG/1
1 TABLET ORAL DAILY PRN
Qty: 10 TABLET | Refills: 0 | Status: SHIPPED | OUTPATIENT
Start: 2021-06-16 | End: 2022-11-04

## 2021-06-16 NOTE — TELEPHONE ENCOUNTER
Signed Prescriptions:                        Disp   Refills    butalbital-acetaminophen-caffeine (ESGIC) *10 tab*0        Sig: Take 1 tablet by mouth daily as needed for headaches  Authorizing Provider: NADEGE MORALES    Reviewed [place]  June 16, 2021- no concerning fills.    Nadege FUENTES, RN CNP, FNP  [facility]  [facility]

## 2021-06-21 ENCOUNTER — MYC MEDICAL ADVICE (OUTPATIENT)
Dept: FAMILY MEDICINE | Facility: CLINIC | Age: 41
End: 2021-06-21

## 2021-06-21 DIAGNOSIS — F33.2 SEVERE EPISODE OF RECURRENT MAJOR DEPRESSIVE DISORDER, WITHOUT PSYCHOTIC FEATURES (H): ICD-10-CM

## 2021-06-21 RX ORDER — VENLAFAXINE HYDROCHLORIDE 37.5 MG/1
112.5 CAPSULE, EXTENDED RELEASE ORAL DAILY
Qty: 91 CAPSULE | Refills: 0 | OUTPATIENT
Start: 2021-06-21

## 2021-06-21 NOTE — TELEPHONE ENCOUNTER
Patient is informed she is due for a visit.  She has No Showed her last 2 virtual visits for depression.  Refill has already been sent to PCP in refill encounter.  Closing this encounter.  SHAQUILLE CalzadaN, RN

## 2021-06-21 NOTE — TELEPHONE ENCOUNTER
PHQ 4/24/2020 3/19/2021 5/3/2021   PHQ-9 Total Score 4 8 9   Q9: Thoughts of better off dead/self-harm past 2 weeks Not at all Not at all Not at all       Routing refill request to provider for review/approval because:  Labs out of range:  PHQ-9    Patient noted in previous MyChart that she did not want to taper lower than 112.5mg as when she went lower, she did not feel well.    Patient has been informed via [employer] she is due for a visit as she has NoShowed her last 2 virtual visits for depression.  Pamela Phan, BSN, RN

## 2021-06-22 ENCOUNTER — MYC MEDICAL ADVICE (OUTPATIENT)
Dept: FAMILY MEDICINE | Facility: CLINIC | Age: 41
End: 2021-06-22

## 2021-06-22 RX ORDER — VENLAFAXINE HYDROCHLORIDE 37.5 MG/1
112.5 CAPSULE, EXTENDED RELEASE ORAL DAILY
Qty: 91 CAPSULE | Refills: 0 | Status: SHIPPED | OUTPATIENT
Start: 2021-06-22 | End: 2021-07-23

## 2021-06-23 ENCOUNTER — MYC MEDICAL ADVICE (OUTPATIENT)
Dept: FAMILY MEDICINE | Facility: CLINIC | Age: 41
End: 2021-06-23

## 2021-07-21 DIAGNOSIS — F33.2 SEVERE EPISODE OF RECURRENT MAJOR DEPRESSIVE DISORDER, WITHOUT PSYCHOTIC FEATURES (H): ICD-10-CM

## 2021-07-23 NOTE — TELEPHONE ENCOUNTER
"Routing refill request to provider for review/approval because:  PHQ9 score out of range  T'd up 3 month for provider review.      Requested Prescriptions   Pending Prescriptions Disp Refills     venlafaxine (EFFEXOR-XR) 37.5 MG 24 hr capsule [Pharmacy Med Name: VENLAFAXINE 37.5MG ER CAPSULE] 90 capsule      Sig: TAKE 3 CAPSULES (112.5 MG) BY MOUTH DAILY   Last Written Prescription Date:  6/22/2021  Last Fill Quantity: 91,  # refills: 0   Last office visit: 5/3/2021 with prescribing provider:     Future Office Visit:   Next 5 appointments (look out 90 days)    Jul 26, 2021 11:30 AM  Return Visit with John Arias MD  [facility] [place] ([facility] ) [address] [facility]  [place] [address]  [phone]             Serotonin-Norepinephrine Reuptake Inhibitors  Failed - 7/21/2021  8:22 AM        Failed - PHQ-9 score of less than 5 in past 6 months     Please review last PHQ-9 score.   PHQ-9 score:    PHQ 5/3/2021   PHQ-9 Total Score 9   Q9: Thoughts of better off dead/self-harm past 2 weeks Not at all           Passed - Blood pressure under 140/90 in past 12 months     BP Readings from Last 3 Encounters:   05/11/21 110/70   05/03/21 116/80   04/07/21 104/76           Passed - Medication is active on med list        Passed - Patient is age 18 or older        Passed - No active pregnancy on record        Passed - Normal serum creatinine on file in past 12 months     Recent Labs   Lab Test 03/19/21  1012   CR 0.68       Ok to refill medication if creatinine is low          Passed - No positive pregnancy test in past 12 months        Passed - Recent (6 mo) or future (30 days) visit within the authorizing provider's specialty     Patient had office visit in the last 6 months or has a visit in the next 30 days with authorizing provider or within the authorizing provider's specialty.  See \"Patient Info\" tab in inbasket, or \"Choose Columns\" in Meds & " Orders section of the refill encounter.             Queenie Del Rio RN

## 2021-07-24 RX ORDER — VENLAFAXINE HYDROCHLORIDE 37.5 MG/1
112.5 CAPSULE, EXTENDED RELEASE ORAL DAILY
Qty: 90 CAPSULE | Refills: 0 | Status: SHIPPED | OUTPATIENT
Start: 2021-07-24 | End: 2021-08-20

## 2021-07-28 ENCOUNTER — NURSE TRIAGE (OUTPATIENT)
Dept: FAMILY MEDICINE | Facility: CLINIC | Age: 41
End: 2021-07-28

## 2021-07-28 ENCOUNTER — APPOINTMENT (OUTPATIENT)
Dept: CT IMAGING | Facility: CLINIC | Age: 41
End: 2021-07-28
Attending: NURSE PRACTITIONER
Payer: COMMERCIAL

## 2021-07-28 ENCOUNTER — HOSPITAL ENCOUNTER (EMERGENCY)
Facility: CLINIC | Age: 41
Discharge: HOME | End: 2021-07-28
Attending: NURSE PRACTITIONER | Admitting: NURSE PRACTITIONER
Payer: COMMERCIAL

## 2021-07-28 VITALS
TEMPERATURE: 98.2 F | OXYGEN SATURATION: 99 % | SYSTOLIC BLOOD PRESSURE: 136 MMHG | DIASTOLIC BLOOD PRESSURE: 80 MMHG | HEART RATE: 81 BPM | RESPIRATION RATE: 16 BRPM | BODY MASS INDEX: 23.78 KG/M2 | WEIGHT: 130 LBS

## 2021-07-28 DIAGNOSIS — R10.9 ACUTE RIGHT FLANK PAIN: ICD-10-CM

## 2021-07-28 DIAGNOSIS — R10.2 PELVIC PAIN IN FEMALE: ICD-10-CM

## 2021-07-28 LAB
ALBUMIN SERPL-MCNC: 3.9 G/DL (ref 3.4–5)
ALBUMIN UR-MCNC: NEGATIVE MG/DL
ALP SERPL-CCNC: 50 U/L (ref 40–150)
ALT SERPL W P-5'-P-CCNC: 19 U/L (ref 0–50)
ANION GAP SERPL CALCULATED.3IONS-SCNC: 4 MMOL/L (ref 3–14)
APPEARANCE UR: CLEAR
AST SERPL W P-5'-P-CCNC: 9 U/L (ref 0–45)
BACTERIA #/AREA URNS HPF: ABNORMAL /HPF
BASOPHILS # BLD AUTO: 0.1 10E3/UL (ref 0–0.2)
BASOPHILS NFR BLD AUTO: 1 %
BILIRUB SERPL-MCNC: 0.4 MG/DL (ref 0.2–1.3)
BILIRUB UR QL STRIP: NEGATIVE
BUN SERPL-MCNC: 11 MG/DL (ref 7–30)
CALCIUM SERPL-MCNC: 8.7 MG/DL (ref 8.5–10.1)
CHLORIDE BLD-SCNC: 108 MMOL/L (ref 94–109)
CO2 SERPL-SCNC: 27 MMOL/L (ref 20–32)
COLOR UR AUTO: ABNORMAL
CREAT SERPL-MCNC: 0.77 MG/DL (ref 0.52–1.04)
EOSINOPHIL # BLD AUTO: 0.3 10E3/UL (ref 0–0.7)
EOSINOPHIL NFR BLD AUTO: 3 %
ERYTHROCYTE [DISTWIDTH] IN BLOOD BY AUTOMATED COUNT: 11.9 % (ref 10–15)
GFR SERPL CREATININE-BSD FRML MDRD: >90 ML/MIN/1.73M2
GLUCOSE BLD-MCNC: 86 MG/DL (ref 70–99)
GLUCOSE UR STRIP-MCNC: NEGATIVE MG/DL
HCT VFR BLD AUTO: 38.5 % (ref 35–47)
HGB BLD-MCNC: 13 G/DL (ref 11.7–15.7)
HGB UR QL STRIP: ABNORMAL
HOLD SPECIMEN: NORMAL
HOLD SPECIMEN: NORMAL
IMM GRANULOCYTES # BLD: 0 10E3/UL
IMM GRANULOCYTES NFR BLD: 0 %
KETONES UR STRIP-MCNC: NEGATIVE MG/DL
LEUKOCYTE ESTERASE UR QL STRIP: NEGATIVE
LIPASE SERPL-CCNC: 154 U/L (ref 73–393)
LYMPHOCYTES # BLD AUTO: 4.5 10E3/UL (ref 0.8–5.3)
LYMPHOCYTES NFR BLD AUTO: 43 %
MCH RBC QN AUTO: 32 PG (ref 26.5–33)
MCHC RBC AUTO-ENTMCNC: 33.8 G/DL (ref 31.5–36.5)
MCV RBC AUTO: 95 FL (ref 78–100)
MONOCYTES # BLD AUTO: 0.6 10E3/UL (ref 0–1.3)
MONOCYTES NFR BLD AUTO: 6 %
MUCOUS THREADS #/AREA URNS LPF: PRESENT /LPF
NEUTROPHILS # BLD AUTO: 5.1 10E3/UL (ref 1.6–8.3)
NEUTROPHILS NFR BLD AUTO: 47 %
NITRATE UR QL: NEGATIVE
NRBC # BLD AUTO: 0 10E3/UL
NRBC BLD AUTO-RTO: 0 /100
PH UR STRIP: 5 [PH] (ref 5–7)
PLATELET # BLD AUTO: 264 10E3/UL (ref 150–450)
POTASSIUM BLD-SCNC: 3.6 MMOL/L (ref 3.4–5.3)
PROT SERPL-MCNC: 6.8 G/DL (ref 6.8–8.8)
RBC # BLD AUTO: 4.06 10E6/UL (ref 3.8–5.2)
RBC URINE: <1 /HPF
SODIUM SERPL-SCNC: 139 MMOL/L (ref 133–144)
SP GR UR STRIP: 1.01 (ref 1–1.03)
SQUAMOUS EPITHELIAL: 1 /HPF
UROBILINOGEN UR STRIP-MCNC: NORMAL MG/DL
WBC # BLD AUTO: 10.6 10E3/UL (ref 4–11)
WBC URINE: <1 /HPF

## 2021-07-28 PROCEDURE — 96372 THER/PROPH/DIAG INJ SC/IM: CPT | Performed by: NURSE PRACTITIONER

## 2021-07-28 PROCEDURE — 81001 URINALYSIS AUTO W/SCOPE: CPT | Performed by: NURSE PRACTITIONER

## 2021-07-28 PROCEDURE — 36415 COLL VENOUS BLD VENIPUNCTURE: CPT | Performed by: NURSE PRACTITIONER

## 2021-07-28 PROCEDURE — 99284 EMERGENCY DEPT VISIT MOD MDM: CPT | Mod: 25

## 2021-07-28 PROCEDURE — 85025 COMPLETE CBC W/AUTO DIFF WBC: CPT | Performed by: NURSE PRACTITIONER

## 2021-07-28 PROCEDURE — 82040 ASSAY OF SERUM ALBUMIN: CPT | Performed by: NURSE PRACTITIONER

## 2021-07-28 PROCEDURE — 99284 EMERGENCY DEPT VISIT MOD MDM: CPT | Performed by: NURSE PRACTITIONER

## 2021-07-28 PROCEDURE — 74176 CT ABD & PELVIS W/O CONTRAST: CPT

## 2021-07-28 PROCEDURE — 250N000011 HC RX IP 250 OP 636: Performed by: NURSE PRACTITIONER

## 2021-07-28 PROCEDURE — 83690 ASSAY OF LIPASE: CPT | Performed by: NURSE PRACTITIONER

## 2021-07-28 RX ORDER — KETOROLAC TROMETHAMINE 30 MG/ML
30 INJECTION, SOLUTION INTRAMUSCULAR; INTRAVENOUS ONCE
Status: COMPLETED | OUTPATIENT
Start: 2021-07-28 | End: 2021-07-28

## 2021-07-28 RX ORDER — SODIUM CHLORIDE 9 MG/ML
INJECTION, SOLUTION INTRAVENOUS CONTINUOUS
Status: DISCONTINUED | OUTPATIENT
Start: 2021-07-28 | End: 2021-07-28 | Stop reason: HOSPADM

## 2021-07-28 RX ORDER — ONDANSETRON 2 MG/ML
4 INJECTION INTRAMUSCULAR; INTRAVENOUS ONCE
Status: DISCONTINUED | OUTPATIENT
Start: 2021-07-28 | End: 2021-07-28 | Stop reason: HOSPADM

## 2021-07-28 RX ORDER — KETOROLAC TROMETHAMINE 15 MG/ML
15 INJECTION, SOLUTION INTRAMUSCULAR; INTRAVENOUS ONCE
Status: DISCONTINUED | OUTPATIENT
Start: 2021-07-28 | End: 2021-07-28

## 2021-07-28 RX ADMIN — KETOROLAC TROMETHAMINE 30 MG: 30 INJECTION, SOLUTION INTRAMUSCULAR; INTRAVENOUS at 14:44

## 2021-07-28 NOTE — ED PROVIDER NOTES
History     Chief Complaint   Patient presents with     Flank Pain     Pelvic Pain     HPI  Nicole Phoenix is a 41 year old female who presents to the emergency department for evaluation of right flank pain and pelvic pain.  Patient states symptoms started on Saturday with low back pain across her entire low back.  Yesterday pain became more focal to the right flank and bilateral low pelvic region.  Denies fever or chills.  She is nauseated, but no vomiting.  Denies urinary symptoms.  Denies constipation or diarrhea.  Denies vaginal discharge. Patient states that she has had a history of ovarian cysts and kidney stones.  She is treating her pain with ibuprofen which she last took at 6 AM this morning.   Past surgeries include total hysterectomy, sparing of her ovaries that was done in March.  Tonsillectomy when she was a child.    PDMP Review       Value Time User    State PDMP site checked  Yes 7/28/2021  2:25 PM Luz Marina Beltran APRN CNP            Allergies:  Allergies   Allergen Reactions     Parafon Forte Dsc [Chlorzoxazone] Hives     Poison Ivy Extract [Extract Of Poison Ivy]        Problem List:    Patient Active Problem List    Diagnosis Date Noted     Abnormal uterine bleeding (AUB) 01/18/2021     Priority: Medium     Added automatically from request for surgery [id number]       Moderate recurrent major depression (H), with anxious distress 04/05/2018     Priority: Medium     Contact dermatitis due to poison ivy 05/31/2017     Priority: Medium     Cervical high risk HPV (human papillomavirus) test positive 11/02/2016     Priority: Medium     11/2/16: NIL Pap, + HR HPV (Neg 16/18). Plan cotest in 1 year.   4/5/18 NIL pap, neg HR HPV. Plan: cotest in 3 years.       Syncope, unspecified syncope type 10/18/2016     Priority: Medium     Neck muscle spasm 10/18/2016     Priority: Medium     Cervicalgia 10/18/2016     Priority: Medium     Fibromyalgia 06/27/2014     Priority: Medium     Intractable chronic  migraine without aura and without status migrainosus 2014     Priority: Medium     Problem list name updated by automated process. Provider to review          Past Medical History:    Past Medical History:   Diagnosis Date     Cervical high risk HPV (human papillomavirus) test positive 16     Cervicalgia 10/18/2016     Neck muscle spasm 10/18/2016     NO ACTIVE PROBLEMS      Syncope, unspecified syncope type 10/18/2016       Past Surgical History:    Past Surgical History:   Procedure Laterality Date     C  DELIVERY+POSTPARTUM CARE       HC REMOVAL OF TONSILS,<13 Y/O       HC REPAIR EXTEN TENDON FINGER W/O GRAFT, EACH  03/15/2009    right ring finger     LAPAROSCOPIC HYSTERECTOMY TOTAL, SALPINGECTOMY BILATERAL N/A 3/24/2021    Procedure: HYSTERECTOMY, TOTAL, LAPAROSCOPIC, WITH BILATERAL SALPINGECTOMY;  Surgeon: Ivan Beasley MD;  Location: [facility]     TUBAL LIGATION         Family History:    Family History   Problem Relation Age of Onset     Breast Cancer Maternal Grandmother 45     Alcohol/Drug Maternal Grandfather         recovering alcoholic     Diabetes Paternal Grandmother      Cerebrovascular Disease Paternal Grandmother        Social History:  Marital Status:   [2]  Social History     Tobacco Use     Smoking status: Current Some Day Smoker     Packs/day: 0.10     Years: 4.00     Pack years: 0.40     Types: Cigarettes     Smokeless tobacco: Never Used   Substance Use Topics     Alcohol use: No     Drug use: No        Medications:    butalbital-acetaminophen-caffeine (ESGIC) -40 MG tablet  pregabalin (LYRICA) 25 MG capsule  tiZANidine (ZANAFLEX) 4 MG tablet  venlafaxine (EFFEXOR-XR) 37.5 MG 24 hr capsule  hydrOXYzine (ATARAX) 10 MG tablet  ondansetron (ZOFRAN) 4 MG tablet  rizatriptan (MAXALT) 10 MG tablet          Review of Systems  As mentioned above in the history present illness. All other systems were reviewed and are negative.    Physical Exam   BP:  136/80  Pulse: 81  Temp: 98.2  F (36.8  C)  Resp: 16  Weight: 59 kg (130 lb)  SpO2: 99 %      Physical Exam  Constitutional:       General: She is in acute distress.      Appearance: Normal appearance. She is well-developed. She is not ill-appearing.   HENT:      Head: Normocephalic and atraumatic.      Right Ear: External ear normal.      Left Ear: External ear normal.      Nose: Nose normal.      Mouth/Throat:      Mouth: Mucous membranes are moist.      Pharynx: No oropharyngeal exudate.   Eyes:      Conjunctiva/sclera: Conjunctivae normal.   Cardiovascular:      Rate and Rhythm: Normal rate and regular rhythm.      Heart sounds: Normal heart sounds. No murmur heard.     Pulmonary:      Effort: Pulmonary effort is normal. No respiratory distress.      Breath sounds: Normal breath sounds.   Abdominal:      General: Bowel sounds are normal. There is no distension.      Palpations: Abdomen is soft.      Tenderness: There is no abdominal tenderness. There is right CVA tenderness.      Comments: Unable to reproduce abdominal pain with palpation. Pt is able to produce pain in the right flank when she pushes on the area herself.   Musculoskeletal:         General: Normal range of motion.   Skin:     General: Skin is warm and dry.      Findings: No rash.   Neurological:      General: No focal deficit present.      Mental Status: She is alert and oriented to person, place, and time.         ED Course        Procedures             Results for orders placed or performed during the hospital encounter of 07/28/21 (from the past 24 hour(s))   UA with Microscopic reflex to Culture    Specimen: Urine, Midstream   Result Value Ref Range    Color Urine Straw Colorless, Straw, Light Yellow, Yellow    Appearance Urine Clear Clear    Glucose Urine Negative Negative mg/dL    Bilirubin Urine Negative Negative    Ketones Urine Negative Negative mg/dL    Specific Gravity Urine 1.008 1.003 - 1.035    Blood Urine Small (A) Negative    pH  Urine 5.0 5.0 - 7.0    Protein Albumin Urine Negative Negative mg/dL    Urobilinogen Urine Normal Normal, 2.0 mg/dL    Nitrite Urine Negative Negative    Leukocyte Esterase Urine Negative Negative    Bacteria Urine Few (A) None Seen /HPF    Mucus Urine Present (A) None Seen /LPF    RBC Urine <1 <=2 /HPF    WBC Urine <1 <=5 /HPF    Squamous Epithelials Urine 1 <=1 /HPF    Narrative    Urine Culture not indicated   CT Abdomen Pelvis w/o Contrast    Narrative    CT ABDOMEN AND PELVIS WITHOUT CONTRAST 7/28/2021 2:02 PM    CLINICAL HISTORY: Flank pain, kidney stone suspected; Right flank and  pelvic pain.    TECHNIQUE: CT scan of the abdomen and pelvis was performed without IV  contrast. Multiplanar reformats were obtained. Dose reduction  techniques were used.    CONTRAST: None.    COMPARISON: CT abdomen and pelvis 4/1/2019.    FINDINGS:   LOWER CHEST: Normal.    HEPATOBILIARY: Normal.    PANCREAS: Normal.    SPLEEN: Normal.    ADRENAL GLANDS: Normal.    KIDNEYS/BLADDER: Tiny right renal collecting system stone is noted on  series 3, image 75. No hydronephrosis or additional urinary tract  calculi. Bladder is unremarkable.    BOWEL: No bowel obstruction, diverticulitis or appendicitis.    LYMPH NODES: No enlarged abdominal or pelvic lymph nodes.    VASCULATURE: Unremarkable.    PELVIC ORGANS: Uterus not identified. Ovaries are unremarkable. Rectum  is within normal limits. No free pelvic fluid.    OTHER: None.    MUSCULOSKELETAL: Normal.      Impression    IMPRESSION:   1.  Tiny nonobstructing stone right renal collecting system. No  hydronephrosis or additional urinary tract calculi.    2.  No bowel obstruction, diverticulitis or appendicitis.    BRONWYN AGUIRRE MD         SYSTEM ID:  [id number]   Comprehensive metabolic panel   Result Value Ref Range    Sodium 139 133 - 144 mmol/L    Potassium 3.6 3.4 - 5.3 mmol/L    Chloride 108 94 - 109 mmol/L    Carbon Dioxide (CO2) 27 20 - 32 mmol/L    Anion Gap 4 3 - 14 mmol/L    Urea  Nitrogen 11 7 - 30 mg/dL    Creatinine 0.77 0.52 - 1.04 mg/dL    Calcium 8.7 8.5 - 10.1 mg/dL    Glucose 86 70 - 99 mg/dL    Alkaline Phosphatase 50 40 - 150 U/L    AST 9 0 - 45 U/L    ALT 19 0 - 50 U/L    Protein Total 6.8 6.8 - 8.8 g/dL    Albumin 3.9 3.4 - 5.0 g/dL    Bilirubin Total 0.4 0.2 - 1.3 mg/dL    GFR Estimate >90 >60 mL/min/1.73m2   Lipase   Result Value Ref Range    Lipase 154 73 - 393 U/L   CBC with platelets differential    Narrative    The following orders were created for panel order CBC with platelets differential.  Procedure                               Abnormality         Status                     ---------                               -----------         ------                     CBC with platelets and d...[[id number]]                      Final result                 Please view results for these tests on the individual orders.   CBC with platelets and differential   Result Value Ref Range    WBC Count 10.6 4.0 - 11.0 10e3/uL    RBC Count 4.06 3.80 - 5.20 10e6/uL    Hemoglobin 13.0 11.7 - 15.7 g/dL    Hematocrit 38.5 35.0 - 47.0 %    MCV 95 78 - 100 fL    MCH 32.0 26.5 - 33.0 pg    MCHC 33.8 31.5 - 36.5 g/dL    RDW 11.9 10.0 - 15.0 %    Platelet Count 264 150 - 450 10e3/uL    % Neutrophils 47 %    % Lymphocytes 43 %    % Monocytes 6 %    % Eosinophils 3 %    % Basophils 1 %    % Immature Granulocytes 0 %    NRBCs per 100 WBC 0 <1 /100    Absolute Neutrophils 5.1 1.6 - 8.3 10e3/uL    Absolute Lymphocytes 4.5 0.8 - 5.3 10e3/uL    Absolute Monocytes 0.6 0.0 - 1.3 10e3/uL    Absolute Eosinophils 0.3 0.0 - 0.7 10e3/uL    Absolute Basophils 0.1 0.0 - 0.2 10e3/uL    Absolute Immature Granulocytes 0.0 <=0.0 10e3/uL    Absolute NRBCs 0.0 10e3/uL   Extra Tube    Narrative    The following orders were created for panel order Extra Tube.  Procedure                               Abnormality         Status                     ---------                               -----------         ------                      Extra Red Top Tube[[id number]]                               In process                 Extra Purple Top Tube[[id number]]                            In process                   Please view results for these tests on the individual orders.       Medications   0.9% sodium chloride BOLUS (has no administration in time range)     Followed by   sodium chloride 0.9% infusion (has no administration in time range)   ondansetron (ZOFRAN) injection 4 mg (has no administration in time range)   ketorolac (TORADOL) injection 30 mg (30 mg Intramuscular Given 7/28/21 1444)     2:39 PM at bedside.  Patient informed of the CT results and UA results.  Unclear etiology for the cause of her pain.  I would not expect the renal stone to be causing her pain.  She has no hydronephrosis or ureteral stone.  No evidence of UTI.  Unfortunately nursing has been unable to establish an IV.  We will proceed with IM Toradol and lab draw only.  Patient is agreeable to this plan.  3:52 PM labs are normal.  I went to bedside to review her labs with patient, but appears she left the department.  Unsure if the IM Toradol helped her pain.       Assessments & Plan (with Medical Decision Making)       I have reviewed the nursing notes.    I have reviewed the findings, diagnosis, plan and need for follow up with the patient.      New Prescriptions    No medications on file       Final diagnoses:   Acute right flank pain   Pelvic pain in female       7/28/2021   [facility] EMERGENCY DEPT     Devin, GINA Kee CNP  07/28/21 1551

## 2021-07-28 NOTE — ED TRIAGE NOTES
Pt presents with bilateral flank pain and right sided pelvic pain. Pt has a hx of kidney stones and ovarian cysts.

## 2021-07-28 NOTE — TELEPHONE ENCOUNTER
Patient returned call to the clinic. We discussed that the ED is the best place to go for care. The patient understood and mentioned she will head to [place].     YESSENIA Boland, RN  [place]  July 28, 2021

## 2021-07-28 NOTE — TELEPHONE ENCOUNTER
"Abdominal pain with back/flank pain that started over the weekend and worsened yesterday. Pain 7/10.     GO TO ED NOW: You need to be seen in the Emergency Department. Go to the ER at [facility]. Leave now. Drive carefully.    Advised patient be seen in emergency room and patient states that she would prefer to be seen in clinic. No available openings.     Patient should be evaluated in ER for possible kidney stones.       SYMONE Moralez/[place] [facility]      1. LOCATION: \"Where does it hurt?\"       Lower abdomen, back, flank  2. RADIATION: \"Does the pain shoot anywhere else?\" (e.g., chest, back)      Down the legs  3. ONSET: \"When did the pain begin?\" (e.g., minutes, hours or days ago)       Over weekend  4. SUDDEN: \"Gradual or sudden onset?\"      gradual  5. PATTERN \"Does the pain come and go, or is it constant?\"     - If constant: \"Is it getting better, staying the same, or worsening?\"       (Note: Constant means the pain never goes away completely; most serious pain is constant and it progresses)      - If intermittent: \"How long does it last?\" \"Do you have pain now?\"      (Note: Intermittent means the pain goes away completely between bouts)      Constant, pain worsens intermittently   6. SEVERITY: \"How bad is the pain?\"  (e.g., Scale 1-10; mild, moderate, or severe)    - MILD (1-3): doesn't interfere with normal activities, abdomen soft and not tender to touch     - MODERATE (4-7): interferes with normal activities or awakens from sleep, tender to touch     - SEVERE (8-10): excruciating pain, doubled over, unable to do any normal activities       7/10  7. RECURRENT SYMPTOM: \"Have you ever had this type of abdominal pain before?\" If so, ask: \"When was the last time?\" and \"What happened that time?\"       Yes, about 4 months ago  8. CAUSE: \"What do you think is causing the abdominal pain?\"      Kidney stones  9. RELIEVING/AGGRAVATING FACTORS: \"What makes it better or " "worse?\" (e.g., movement, antacids, bowel movement)      Heat pack helps  10. OTHER SYMPTOMS: \"Has there been any vomiting, diarrhea, constipation, or urine problems?\"        Nausea  11. PREGNANCY: \"Is there any chance you are pregnant?\" \"When was your last menstrual period?\"        No, had hysterectomy       Reason for Disposition    SEVERE abdominal pain (e.g., excruciating)    Additional Information    Negative: Passed out (i.e., fainted, collapsed and was not responding)    Negative: Shock suspected (e.g., cold/pale/clammy skin, too weak to stand, low BP, rapid pulse)    Negative: Sounds like a life-threatening emergency to the triager    Negative: Chest pain    Negative: Pain is mainly in upper abdomen (if needed ask: 'is it mainly above the belly button?')    Negative: Abdominal pain and pregnant > 20 weeks    Negative: Abdominal pain and pregnant < 20 weeks    Protocols used: ABDOMINAL PAIN - FEMALE-A-OH      "

## 2021-08-20 DIAGNOSIS — F33.2 SEVERE EPISODE OF RECURRENT MAJOR DEPRESSIVE DISORDER, WITHOUT PSYCHOTIC FEATURES (H): ICD-10-CM

## 2021-08-20 RX ORDER — VENLAFAXINE HYDROCHLORIDE 37.5 MG/1
112.5 CAPSULE, EXTENDED RELEASE ORAL DAILY
Qty: 90 CAPSULE | Refills: 0 | Status: SHIPPED | OUTPATIENT
Start: 2021-08-20 | End: 2021-09-17

## 2021-08-20 NOTE — TELEPHONE ENCOUNTER
Pending Prescriptions:                       Disp   Refills    venlafaxine (EFFEXOR-XR) 37.5 MG 24 hr cap*90 cap*0        Sig: TAKE 3 CAPSULES (112.5 MG) BY MOUTH DAILY      Routing refill request to provider for review/approval because:  Labs out of range:  [id number]    Vee Fitzpatrick RN on 8/20/2021 at 4:44 PM

## 2021-09-17 ENCOUNTER — OFFICE VISIT (OUTPATIENT)
Dept: FAMILY MEDICINE | Facility: CLINIC | Age: 41
End: 2021-09-17
Payer: COMMERCIAL

## 2021-09-17 VITALS
DIASTOLIC BLOOD PRESSURE: 78 MMHG | WEIGHT: 127.4 LBS | HEIGHT: 63 IN | HEART RATE: 82 BPM | SYSTOLIC BLOOD PRESSURE: 112 MMHG | RESPIRATION RATE: 16 BRPM | OXYGEN SATURATION: 98 % | BODY MASS INDEX: 22.57 KG/M2 | TEMPERATURE: 98 F

## 2021-09-17 DIAGNOSIS — Z71.6 ENCOUNTER FOR SMOKING CESSATION COUNSELING: ICD-10-CM

## 2021-09-17 DIAGNOSIS — G95.0 SYRINX OF SPINAL CORD (H): ICD-10-CM

## 2021-09-17 DIAGNOSIS — F33.2 SEVERE EPISODE OF RECURRENT MAJOR DEPRESSIVE DISORDER, WITHOUT PSYCHOTIC FEATURES (H): Primary | ICD-10-CM

## 2021-09-17 PROCEDURE — 99214 OFFICE O/P EST MOD 30 MIN: CPT | Performed by: PHYSICIAN ASSISTANT

## 2021-09-17 RX ORDER — PROPRANOLOL HYDROCHLORIDE 10 MG/1
10 TABLET ORAL DAILY
Qty: 30 TABLET | Refills: 0 | Status: SHIPPED | OUTPATIENT
Start: 2021-09-17 | End: 2021-11-10

## 2021-09-17 RX ORDER — VENLAFAXINE HYDROCHLORIDE 37.5 MG/1
75-112.5 CAPSULE, EXTENDED RELEASE ORAL DAILY
Qty: 270 CAPSULE | Refills: 1 | Status: SHIPPED | OUTPATIENT
Start: 2021-09-17 | End: 2022-06-03

## 2021-09-17 ASSESSMENT — ANXIETY QUESTIONNAIRES
7. FEELING AFRAID AS IF SOMETHING AWFUL MIGHT HAPPEN: NOT AT ALL
GAD7 TOTAL SCORE: 4
6. BECOMING EASILY ANNOYED OR IRRITABLE: NOT AT ALL
3. WORRYING TOO MUCH ABOUT DIFFERENT THINGS: SEVERAL DAYS
IF YOU CHECKED OFF ANY PROBLEMS ON THIS QUESTIONNAIRE, HOW DIFFICULT HAVE THESE PROBLEMS MADE IT FOR YOU TO DO YOUR WORK, TAKE CARE OF THINGS AT HOME, OR GET ALONG WITH OTHER PEOPLE: SOMEWHAT DIFFICULT
5. BEING SO RESTLESS THAT IT IS HARD TO SIT STILL: NOT AT ALL
2. NOT BEING ABLE TO STOP OR CONTROL WORRYING: SEVERAL DAYS
1. FEELING NERVOUS, ANXIOUS, OR ON EDGE: SEVERAL DAYS

## 2021-09-17 ASSESSMENT — PATIENT HEALTH QUESTIONNAIRE - PHQ9
5. POOR APPETITE OR OVEREATING: SEVERAL DAYS
SUM OF ALL RESPONSES TO PHQ QUESTIONS 1-9: 8

## 2021-09-17 ASSESSMENT — PAIN SCALES - GENERAL: PAINLEVEL: MODERATE PAIN (5)

## 2021-09-17 ASSESSMENT — MIFFLIN-ST. JEOR: SCORE: 1204.07

## 2021-09-17 NOTE — PROGRESS NOTES
Assessment & Plan     Severe episode of recurrent major depressive disorder, without psychotic features (H)  Patient has been trying to taper off of the effexor for the past year. She has been unable to tolerate tapering to 37.5mg without discontinuation symptoms. We discussed extending out taper by taking lower dose 1-2x/week 1-2 months, then every other day for 1-2 months and then daily. Once she tolerates this taper we can discuss tapering further. If the patient continues to struggle I will have her meet with a psychiatrist.   - venlafaxine (EFFEXOR-XR) 37.5 MG 24 hr capsule; Take 2-3 capsules (.5 mg) by mouth daily  - propranolol (INDERAL) 10 MG tablet; Take 1 tablet (10 mg) by mouth daily    Encounter for smoking cessation counseling  Reviewed the possible adverse effects of the medication and strategies for quitting smoking.   - varenicline (CHANTIX HAY) 0.5 MG X 11 & 1 MG X 42 tablet; Take 0.5 mg tab daily for 3 days, THEN 0.5 mg tab twice daily for 4 days, THEN 1 mg twice daily.    Syrinx of spinal cord (H)  Stable, no new concerns at this time. Patient will continue to monitor and contact clinic if symptoms recur.            Tobacco Cessation:   reports that she has been smoking cigarettes. She has a 0.40 pack-year smoking history. She has never used smokeless tobacco.        RAPHAEL Lozano [facility] RANCHO Rivera is a 41 year old who presents for the following health issues     HPI     Depression and Anxiety Follow-Up    How are you doing with your depression since your last visit? No change    How are you doing with your anxiety since your last visit?  No change    Are you having other symptoms that might be associated with depression or anxiety? Yes:  heart racing, palpitations    Have you had a significant life event? No     Do you have any concerns with your use of alcohol or other drugs? No    Social History     Tobacco Use     Smoking status:  Current Some Day Smoker     Packs/day: 0.10     Years: 4.00     Pack years: 0.40     Types: Cigarettes     Smokeless tobacco: Never Used   Vaping Use     Vaping Use: Never used   Substance Use Topics     Alcohol use: No     Drug use: No     PHQ 3/19/2021 5/3/2021 9/17/2021   PHQ-9 Total Score 8 9 8   Q9: Thoughts of better off dead/self-harm past 2 weeks Not at all Not at all Not at all     RAYMOND-7 SCORE 4/24/2020 5/3/2021 9/17/2021   Total Score 4 0 4     Last PHQ-9 9/17/2021   1.  Little interest or pleasure in doing things 0   2.  Feeling down, depressed, or hopeless 0   3.  Trouble falling or staying asleep, or sleeping too much 3   4.  Feeling tired or having little energy 3   5.  Poor appetite or overeating 0   6.  Feeling bad about yourself 0   7.  Trouble concentrating 2   8.  Moving slowly or restless 0   Q9: Thoughts of better off dead/self-harm past 2 weeks 0   PHQ-9 Total Score 8   Difficulty at work, home, or with people Very difficult     RAYMOND-7  9/17/2021   1. Feeling nervous, anxious, or on edge 1   2. Not being able to stop or control worrying 1   3. Worrying too much about different things 1   4. Trouble relaxing 1   5. Being so restless that it is hard to sit still 0   6. Becoming easily annoyed or irritable 0   7. Feeling afraid, as if something awful might happen 0   RAYMOND-7 Total Score 4   If you checked any problems, how difficult have they made it for you to do your work, take care of things at home, or get along with other people? Somewhat difficult       Suicide Assessment Five-step Evaluation and Treatment (SAFE-T)      How many servings of fruits and vegetables do you eat daily?  2-3    On average, how many sweetened beverages do you drink each day (Examples: soda, juice, sweet tea, etc.  Do NOT count diet or artificially sweetened beverages)?   1    How many days per week do you exercise enough to make your heart beat faster? 0    How many minutes a day do you exercise enough to make your  "heart beat faster? 0    How many days per week do you miss taking your medication? 0        Review of Systems   Constitutional, HEENT, cardiovascular, pulmonary, gi and gu systems are negative, except as otherwise noted.      Objective    /78   Pulse 82   Temp 98  F (36.7  C) (Temporal)   Resp 16   Ht 1.588 m (5' 2.5\")   Wt 57.8 kg (127 lb 6.4 oz)   LMP 03/10/2021   SpO2 98%   Breastfeeding No   BMI 22.93 kg/m    Body mass index is 22.93 kg/m .  Physical Exam   GENERAL: healthy, alert and no distress  NECK: no adenopathy, no asymmetry, masses, or scars and thyroid normal to palpation  RESP: lungs clear to auscultation - no rales, rhonchi or wheezes  CV: regular rate and rhythm, normal S1 S2, no S3 or S4, no murmur, click or rub, no peripheral edema and peripheral pulses strong  MS: no gross musculoskeletal defects noted, no edema  NEURO: Normal strength and tone, mentation intact and speech normal  PSYCH: mentation appears normal, affect normal/bright                  "

## 2021-09-17 NOTE — NURSING NOTE
Health Maintenance Due   Topic Date Due     ANNUAL REVIEW OF HM ORDERS  Never done     ADVANCE CARE PLANNING  Never done     Pneumococcal Vaccine: Pediatrics (0 to 5 Years) and At-Risk Patients (6 to 64 Years) (1 of 2 - PPSV23) Never done     HEPATITIS C SCREENING  Never done     DTAP/TDAP/TD IMMUNIZATION (2 - Td or Tdap) 03/15/2019     PREVENTIVE CARE VISIT  04/05/2019     HPV TEST  04/05/2021     PAP  04/05/2021     INFLUENZA VACCINE (1) 09/01/2021     COVID-19 Vaccine (2 - Pfizer 2-dose series) 09/08/2021     Lacy Renteria [facility]

## 2021-09-18 ASSESSMENT — ANXIETY QUESTIONNAIRES: GAD7 TOTAL SCORE: 4

## 2021-09-19 ENCOUNTER — HEALTH MAINTENANCE LETTER (OUTPATIENT)
Age: 41
End: 2021-09-19

## 2021-09-19 PROBLEM — G95.0 SYRINX OF SPINAL CORD (H): Status: ACTIVE | Noted: 2021-09-19

## 2021-09-26 DIAGNOSIS — N94.10 DYSPAREUNIA IN FEMALE: Primary | ICD-10-CM

## 2021-10-09 ENCOUNTER — LAB REQUISITION (OUTPATIENT)
Dept: LAB | Facility: CLINIC | Age: 41
End: 2021-10-09

## 2021-10-09 PROCEDURE — U0003 INFECTIOUS AGENT DETECTION BY NUCLEIC ACID (DNA OR RNA); SEVERE ACUTE RESPIRATORY SYNDROME CORONAVIRUS 2 (SARS-COV-2) (CORONAVIRUS DISEASE [COVID-19]), AMPLIFIED PROBE TECHNIQUE, MAKING USE OF HIGH THROUGHPUT TECHNOLOGIES AS DESCRIBED BY CMS-2020-01-R: HCPCS | Performed by: FAMILY MEDICINE

## 2021-10-10 LAB — SARS-COV-2 RNA RESP QL NAA+PROBE: NEGATIVE

## 2021-11-09 ENCOUNTER — HOSPITAL ENCOUNTER (OUTPATIENT)
Dept: ULTRASOUND IMAGING | Facility: CLINIC | Age: 41
Discharge: HOME | End: 2021-11-09
Attending: PHYSICIAN ASSISTANT | Admitting: PHYSICIAN ASSISTANT
Payer: COMMERCIAL

## 2021-11-09 ENCOUNTER — OFFICE VISIT (OUTPATIENT)
Dept: FAMILY MEDICINE | Facility: CLINIC | Age: 41
End: 2021-11-09
Payer: COMMERCIAL

## 2021-11-09 VITALS
RESPIRATION RATE: 18 BRPM | HEIGHT: 62 IN | TEMPERATURE: 98.2 F | DIASTOLIC BLOOD PRESSURE: 72 MMHG | HEART RATE: 80 BPM | WEIGHT: 131 LBS | SYSTOLIC BLOOD PRESSURE: 110 MMHG | BODY MASS INDEX: 24.11 KG/M2 | OXYGEN SATURATION: 97 %

## 2021-11-09 DIAGNOSIS — N95.1 SYMPTOMATIC MENOPAUSAL OR FEMALE CLIMACTERIC STATES: Primary | ICD-10-CM

## 2021-11-09 DIAGNOSIS — N94.10 DYSPAREUNIA IN FEMALE: ICD-10-CM

## 2021-11-09 LAB
ESTRADIOL SERPL-MCNC: 87 PG/ML
FSH SERPL-ACNC: 5.9 IU/L

## 2021-11-09 PROCEDURE — 36415 COLL VENOUS BLD VENIPUNCTURE: CPT | Performed by: OBSTETRICS & GYNECOLOGY

## 2021-11-09 PROCEDURE — 76830 TRANSVAGINAL US NON-OB: CPT

## 2021-11-09 PROCEDURE — 83001 ASSAY OF GONADOTROPIN (FSH): CPT | Performed by: OBSTETRICS & GYNECOLOGY

## 2021-11-09 PROCEDURE — 99214 OFFICE O/P EST MOD 30 MIN: CPT | Performed by: OBSTETRICS & GYNECOLOGY

## 2021-11-09 PROCEDURE — 82670 ASSAY OF TOTAL ESTRADIOL: CPT | Performed by: OBSTETRICS & GYNECOLOGY

## 2021-11-09 RX ORDER — ESTRADIOL 1 MG/1
1 TABLET ORAL DAILY
Qty: 90 TABLET | Refills: 3 | Status: SHIPPED | OUTPATIENT
Start: 2021-11-09 | End: 2022-11-04

## 2021-11-09 ASSESSMENT — MIFFLIN-ST. JEOR: SCORE: 1212.46

## 2021-11-09 ASSESSMENT — PAIN SCALES - GENERAL: PAINLEVEL: MODERATE PAIN (5)

## 2021-11-09 NOTE — PROGRESS NOTES
subjective:    She is 41 years old  3 para 1-2-0-2 and she had total laparoscopic hysterectomy and bilateral salpingectomy on 2021 by Dr. Beasley.  She states that ever since the surgery she has had bilateral pelvic pain especially noted with intercourse.  She did have some spotting with intercourse for the first few weeks but she resumed intercourse 6 weeks after surgery.  However the spotting has resolved.  Nevertheless she continues to have discomfort with intercourse and bilateral pelvic pain intermittently at random times.  Also some hot flashes.  The ovaries were retained.    She really wonders if she could be menopausal.  Just does not feel right since the surgery was done.  No vaginal bleeding.      The past medical history, social history, past surgical history and family history as shown below have been reviewed by me today.    Past Medical History:   Diagnosis Date     Cervical high risk HPV (human papillomavirus) test positive 16    Neg      Cervicalgia 10/18/2016     Neck muscle spasm 10/18/2016     NO ACTIVE PROBLEMS      Syncope, unspecified syncope type 10/18/2016        Allergies   Allergen Reactions     Parafon Forte Dsc [Chlorzoxazone] Hives     Poison Ivy Extract [Extract Of Poison Ivy]      Current Outpatient Medications   Medication Sig Dispense Refill     butalbital-acetaminophen-caffeine (ESGIC) -40 MG tablet Take 1 tablet by mouth daily as needed for headaches 10 tablet 0     ondansetron (ZOFRAN) 4 MG tablet Take 1 tablet (4 mg) by mouth every 8 hours as needed for nausea 60 tablet 0     propranolol (INDERAL) 10 MG tablet Take 1 tablet (10 mg) by mouth daily 30 tablet 0     rizatriptan (MAXALT) 10 MG tablet Take 1 tablet (10 mg) by mouth at onset of headache for migraine (OK to retake one tablet two hours after first if not relief) 10 tablet 1     tiZANidine (ZANAFLEX) 4 MG tablet Take 1 tablet (4 mg) by mouth 3 times daily as needed for muscle spasms (Patient  taking differently: Take 4 mg by mouth At Bedtime ) 90 tablet 1     varenicline (CHANTIX HAY) 0.5 MG X 11 & 1 MG X 42 tablet Take 0.5 mg tab daily for 3 days, THEN 0.5 mg tab twice daily for 4 days, THEN 1 mg twice daily. 53 tablet 0     venlafaxine (EFFEXOR-XR) 37.5 MG 24 hr capsule Take 2-3 capsules (.5 mg) by mouth daily 270 capsule 1     Past Surgical History:   Procedure Laterality Date     C  DELIVERY+POSTPARTUM CARE       HC REMOVAL OF TONSILS,<11 Y/O       HC REPAIR EXTEN TENDON FINGER W/O GRAFT, EACH  03/15/2009    right ring finger     LAPAROSCOPIC HYSTERECTOMY TOTAL, SALPINGECTOMY BILATERAL N/A 3/24/2021    Procedure: HYSTERECTOMY, TOTAL, LAPAROSCOPIC, WITH BILATERAL SALPINGECTOMY;  Surgeon: Ivan Beasley MD;  Location: [facility]     TUBAL LIGATION       Social History     Socioeconomic History     Marital status:      Spouse name: None     Number of children: None     Years of education: None     Highest education level: None   Occupational History     None   Tobacco Use     Smoking status: Current Some Day Smoker     Packs/day: 0.10     Years: 4.00     Pack years: 0.40     Types: Cigarettes     Smokeless tobacco: Never Used   Vaping Use     Vaping Use: Never used   Substance and Sexual Activity     Alcohol use: No     Drug use: No     Sexual activity: Yes     Partners: Male     Birth control/protection: Female Surgical     Comment: hysterectomy   Other Topics Concern      Service Yes     Comment: [id number]  Never deployed outside of the [place]     Blood Transfusions No     Caffeine Concern Yes     Comment: 1 cup coffee/day (most) - Advised not more than 16 ounces/day     Occupational Exposure No     Comment: [employer]/brain injuries     Hobby Hazards Yes     Comment: Farms/feeding cows     Sleep Concern Yes     Stress Concern No     Weight Concern No     Special Diet No     Back Care No     Exercise Yes     Comment: Farm work     Bike  "Helmet Not Asked     Seat Belt Yes     Self-Exams Yes     Parent/sibling w/ CABG, MI or angioplasty before 65F 55M? Not Asked   Social History Narrative     and lives at home with her  and daughter.  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             Social Determinants of Health     Financial Resource Strain: Not on file   Food Insecurity: Not on file   Transportation Needs: Not on file   Physical Activity: Not on file   Stress: Not on file   Social Connections: Not on file   Intimate Partner Violence: Not on file   Housing Stability: Not on file     Family History   Problem Relation Age of Onset     Breast Cancer Maternal Grandmother 45     Alcohol/Drug Maternal Grandfather         recovering alcoholic     Diabetes Paternal Grandmother      Cerebrovascular Disease Paternal Grandmother        ROS: A 12 point review of systems was done. Except for what is listed above in the HPI, the systems review is negative .      Objective: Vital signs: Blood pressure 110/72, pulse 80, temperature 98.2  F (36.8  C), temperature source Temporal, resp. rate 18, height 1.575 m (5' 2\"), weight 59.4 kg (131 lb), last menstrual period 03/10/2021, SpO2 97 %, not currently breastfeeding.    Chest is clear to auscultation.  No wheezes, rales or rhonchi heard.  Cardiac exam is normal with s1, s2, no murmurs or adventitious sounds.Normal rate and rhythm is heard.   Abdomen is soft,  nondistended, No masses felt.No HSM. No guarding or rigidity or rebound   noted. Palpation reveals  no    tenderness   Normal bowel sounds heard.    Exam was done with my nurse Florecita present.  External genitalia look normal.  Vaginal vault is without bleeding or discharge.  The introitus is nontender to palpation but she does have discomfort with deep palpation against the vaginal cuff.  The cuff is well-healed.  There is no bleeding noted.  Bimanual exam confirms that the ovaries are small and no masses are felt.  She has minimal tenderness over the right " adnexal area but not on the left side.      She had an ultrasound today but the results are not yet available.    Assessment/Plan:     1.  Symptomatic menopausal state: I will check FSH and estradiol levels today.  I will call with results.  Even if they are in the normal range we did discuss the potential that she may have dropped estrogen levels since surgery and she may be feeling that drop.  Therefore if she wants to try some estrogen replacement I would be willing to do that for her.  Did discuss potential risks and benefits.    2.  Pelvic pain-dyspareunia-I suspect she is still healing from the surgery and I reassured her about this.  I expect that in the next 6 months this may improve.  Otherwise I do not have any solutions for what she can do but I did suggest that she make sure she stays adequately lubricated during intercourse and to use some lubricant ahead of time if possible.    A total of 35 minutes were spent in today's visit including the time spent with  the patient in addition to the time spent just prior to the visit reviewing the chart  and then charting afterwards on this patient today.         The above information was dictating using Dragon voice software and as a result there may be some irregularities that were not detected in my review of this note.    NARGIS Rajput MD

## 2021-11-10 ENCOUNTER — MYC REFILL (OUTPATIENT)
Dept: FAMILY MEDICINE | Facility: CLINIC | Age: 41
End: 2021-11-10
Payer: COMMERCIAL

## 2021-11-10 DIAGNOSIS — F33.2 SEVERE EPISODE OF RECURRENT MAJOR DEPRESSIVE DISORDER, WITHOUT PSYCHOTIC FEATURES (H): ICD-10-CM

## 2021-11-12 RX ORDER — PROPRANOLOL HYDROCHLORIDE 10 MG/1
10 TABLET ORAL DAILY
Qty: 30 TABLET | Refills: 5 | Status: SHIPPED | OUTPATIENT
Start: 2021-11-12 | End: 2022-11-04

## 2021-11-12 NOTE — TELEPHONE ENCOUNTER
Prescription approved per [facility] Refill Protocol.  Vee Fitzpatrick RN on 11/12/2021 at 9:32 AM

## 2022-01-09 ENCOUNTER — HEALTH MAINTENANCE LETTER (OUTPATIENT)
Age: 42
End: 2022-01-09

## 2022-05-01 ENCOUNTER — HEALTH MAINTENANCE LETTER (OUTPATIENT)
Age: 42
End: 2022-05-01

## 2022-06-01 ENCOUNTER — MYC MEDICAL ADVICE (OUTPATIENT)
Dept: FAMILY MEDICINE | Facility: CLINIC | Age: 42
End: 2022-06-01
Payer: COMMERCIAL

## 2022-11-04 ENCOUNTER — OFFICE VISIT (OUTPATIENT)
Dept: FAMILY MEDICINE | Facility: CLINIC | Age: 42
End: 2022-11-04
Payer: COMMERCIAL

## 2022-11-04 ENCOUNTER — OFFICE VISIT (OUTPATIENT)
Dept: GASTROENTEROLOGY | Facility: CLINIC | Age: 42
End: 2022-11-04
Payer: COMMERCIAL

## 2022-11-04 ENCOUNTER — HOSPITAL ENCOUNTER (OUTPATIENT)
Dept: GENERAL RADIOLOGY | Facility: CLINIC | Age: 42
Discharge: HOME | End: 2022-11-04
Attending: NURSE PRACTITIONER
Payer: COMMERCIAL

## 2022-11-04 ENCOUNTER — HOSPITAL ENCOUNTER (OUTPATIENT)
Dept: ULTRASOUND IMAGING | Facility: CLINIC | Age: 42
Discharge: HOME | End: 2022-11-04
Attending: NURSE PRACTITIONER
Payer: COMMERCIAL

## 2022-11-04 VITALS
BODY MASS INDEX: 23.99 KG/M2 | DIASTOLIC BLOOD PRESSURE: 70 MMHG | TEMPERATURE: 97.2 F | RESPIRATION RATE: 18 BRPM | HEART RATE: 88 BPM | OXYGEN SATURATION: 99 % | WEIGHT: 130.38 LBS | SYSTOLIC BLOOD PRESSURE: 118 MMHG | HEIGHT: 62 IN

## 2022-11-04 VITALS — WEIGHT: 130 LBS | HEIGHT: 62 IN | BODY MASS INDEX: 23.92 KG/M2

## 2022-11-04 DIAGNOSIS — R10.84 ACUTE GENERALIZED ABDOMINAL PAIN: Primary | ICD-10-CM

## 2022-11-04 DIAGNOSIS — R14.0 ABDOMINAL BLOATING: ICD-10-CM

## 2022-11-04 DIAGNOSIS — R11.0 NAUSEA: ICD-10-CM

## 2022-11-04 DIAGNOSIS — R10.84 ACUTE GENERALIZED ABDOMINAL PAIN: ICD-10-CM

## 2022-11-04 DIAGNOSIS — F33.2 SEVERE EPISODE OF RECURRENT MAJOR DEPRESSIVE DISORDER, WITHOUT PSYCHOTIC FEATURES (H): ICD-10-CM

## 2022-11-04 PROBLEM — L23.7 CONTACT DERMATITIS DUE TO POISON IVY: Status: RESOLVED | Noted: 2017-05-31 | Resolved: 2022-11-04

## 2022-11-04 LAB
ALBUMIN SERPL-MCNC: 4.6 G/DL (ref 3.4–5)
ALP SERPL-CCNC: 50 U/L (ref 40–150)
ALT SERPL W P-5'-P-CCNC: 25 U/L (ref 0–50)
AMYLASE SERPL-CCNC: 49 U/L (ref 30–110)
ANION GAP SERPL CALCULATED.3IONS-SCNC: 7 MMOL/L (ref 3–14)
AST SERPL W P-5'-P-CCNC: 10 U/L (ref 0–45)
BILIRUB SERPL-MCNC: 0.6 MG/DL (ref 0.2–1.3)
BUN SERPL-MCNC: 13 MG/DL (ref 7–30)
CALCIUM SERPL-MCNC: 9.1 MG/DL (ref 8.5–10.1)
CHLORIDE BLD-SCNC: 105 MMOL/L (ref 94–109)
CO2 SERPL-SCNC: 26 MMOL/L (ref 20–32)
CREAT SERPL-MCNC: 0.69 MG/DL (ref 0.52–1.04)
CRP SERPL-MCNC: <2.9 MG/L (ref 0–8)
ERYTHROCYTE [DISTWIDTH] IN BLOOD BY AUTOMATED COUNT: 12.2 % (ref 10–15)
GFR SERPL CREATININE-BSD FRML MDRD: >90 ML/MIN/1.73M2
GGT SERPL-CCNC: 19 U/L (ref 0–40)
GLUCOSE BLD-MCNC: 98 MG/DL (ref 70–99)
HCT VFR BLD AUTO: 41.1 % (ref 35–47)
HGB BLD-MCNC: 13.8 G/DL (ref 11.7–15.7)
LIPASE SERPL-CCNC: 217 U/L (ref 73–393)
MCH RBC QN AUTO: 31.7 PG (ref 26.5–33)
MCHC RBC AUTO-ENTMCNC: 33.6 G/DL (ref 31.5–36.5)
MCV RBC AUTO: 94 FL (ref 78–100)
PLATELET # BLD AUTO: 303 10E3/UL (ref 150–450)
POTASSIUM BLD-SCNC: 3.8 MMOL/L (ref 3.4–5.3)
PROT SERPL-MCNC: 7.8 G/DL (ref 6.8–8.8)
RBC # BLD AUTO: 4.36 10E6/UL (ref 3.8–5.2)
SODIUM SERPL-SCNC: 138 MMOL/L (ref 133–144)
WBC # BLD AUTO: 9.1 10E3/UL (ref 4–11)

## 2022-11-04 PROCEDURE — 96127 BRIEF EMOTIONAL/BEHAV ASSMT: CPT | Performed by: NURSE PRACTITIONER

## 2022-11-04 PROCEDURE — 83690 ASSAY OF LIPASE: CPT | Performed by: NURSE PRACTITIONER

## 2022-11-04 PROCEDURE — 76700 US EXAM ABDOM COMPLETE: CPT

## 2022-11-04 PROCEDURE — 82977 ASSAY OF GGT: CPT | Performed by: NURSE PRACTITIONER

## 2022-11-04 PROCEDURE — 85027 COMPLETE CBC AUTOMATED: CPT | Performed by: NURSE PRACTITIONER

## 2022-11-04 PROCEDURE — 36415 COLL VENOUS BLD VENIPUNCTURE: CPT | Performed by: NURSE PRACTITIONER

## 2022-11-04 PROCEDURE — 82150 ASSAY OF AMYLASE: CPT | Performed by: NURSE PRACTITIONER

## 2022-11-04 PROCEDURE — 86140 C-REACTIVE PROTEIN: CPT | Performed by: NURSE PRACTITIONER

## 2022-11-04 PROCEDURE — 74019 RADEX ABDOMEN 2 VIEWS: CPT

## 2022-11-04 PROCEDURE — 99204 OFFICE O/P NEW MOD 45 MIN: CPT | Performed by: NURSE PRACTITIONER

## 2022-11-04 PROCEDURE — 80053 COMPREHEN METABOLIC PANEL: CPT | Performed by: NURSE PRACTITIONER

## 2022-11-04 PROCEDURE — 99214 OFFICE O/P EST MOD 30 MIN: CPT | Performed by: NURSE PRACTITIONER

## 2022-11-04 RX ORDER — OMEPRAZOLE 40 MG/1
40 CAPSULE, DELAYED RELEASE ORAL 2 TIMES DAILY
Qty: 60 CAPSULE | Refills: 3 | Status: SHIPPED | OUTPATIENT
Start: 2022-11-04

## 2022-11-04 RX ORDER — OMEPRAZOLE 10 MG/1
20 CAPSULE, DELAYED RELEASE ORAL DAILY
COMMUNITY

## 2022-11-04 RX ORDER — ALUMINA, MAGNESIA, AND SIMETHICONE 2400; 2400; 240 MG/30ML; MG/30ML; MG/30ML
30 SUSPENSION ORAL ONCE
Status: ACTIVE | OUTPATIENT
Start: 2022-11-04

## 2022-11-04 RX ORDER — ONDANSETRON 4 MG/1
4 TABLET, FILM COATED ORAL EVERY 8 HOURS PRN
Qty: 60 TABLET | Refills: 0 | Status: CANCELLED | OUTPATIENT
Start: 2022-11-04

## 2022-11-04 ASSESSMENT — PAIN SCALES - GENERAL
PAINLEVEL: SEVERE PAIN (6)
PAINLEVEL: SEVERE PAIN (6)

## 2022-11-04 ASSESSMENT — PATIENT HEALTH QUESTIONNAIRE - PHQ9
10. IF YOU CHECKED OFF ANY PROBLEMS, HOW DIFFICULT HAVE THESE PROBLEMS MADE IT FOR YOU TO DO YOUR WORK, TAKE CARE OF THINGS AT HOME, OR GET ALONG WITH OTHER PEOPLE: VERY DIFFICULT
SUM OF ALL RESPONSES TO PHQ QUESTIONS 1-9: 10
SUM OF ALL RESPONSES TO PHQ QUESTIONS 1-9: 10

## 2022-11-04 NOTE — PATIENT INSTRUCTIONS
It was a pleasure taking care of you today.  I've included a brief summary of our discussion and care plan from today's visit below.  Please review this information with your primary care provider.  ______________________________________________________________________    My recommendations are summarized as follows:    To work up several differential diagnosis as potential causes of your symptoms, I ordered STAT abdominal ultrasound. Will also do lab work and x-ray of abdomen.    2. Will refer you to upper and lower endoscopy. Hopefully, this will be scheduled in the next 2 weeks.    3. Increase omeprazole dose to 40 mg twice a day. Take it 30 minutes before a meal.    4. Start amitriptyline 12.5 mg at HS and increase it to 25 mg in 3-4 days as tolerate.    5. Eat small frequent meals. Avoid spicy and greasy foods, caffeine, and alcohol. Avoid dairy. (GI soft diet).      Return to GI Clinic in 4 weeks to review your progress. OK to schedule a virtual visit you prefer.   ______________________________________________________________________    Who do I call with any questions after my visit?  Please be in touch if there are any further questions that arise following today's visit.  There are multiple ways to contact your gastroenterology care team.      During business hours, you may reach a Gastroenterology nurse at [phone], option 3.     To schedule or reschedule an appointment, please call [phone].   To schedule your lab work at [facility], please call [phone]    You can always send a secure message through [employer].  [employer] messages are answered by your nurse or doctor typically within 24 hours.  Please allow extra time on weekends and holidays.      For urgent/emergent questions after business hours, you may reach the on-call GI Fellow by contacting the [facility]  at [phone].    In order for your refill to be processed in a timely fashion, it is  your responsibility to ensure you follow the recommendations from your provider regarding your laboratory studies and follow up appointments.       How will I get the results of any tests ordered?    You will receive all of your results.  If you have signed up for [employer], any tests ordered at your visit will be available to you after your physician reviews them.  Typically this takes 1-2 weeks.  If there are urgent results that require a change in your care plan, your physician or nurse will call you to discuss the next steps.   What is [employer]?  [employer] is a secure way for you to access all of your healthcare records from the [facility].  It is a web based computer program, so you can sign on to it from any location.  It also allows you to send secure messages to your care team.  I recommend signing up for [employer] access if you have not already done so and are comfortable with using a computer.    How to I schedule a follow-up visit?  If you did not schedule a follow-up visit today, please call [phone] to schedule a follow-up office visit.      Sincerely,  JONATHAN Cantrell,  [facility],  Division of Gastroenterology   ([facility])

## 2022-11-04 NOTE — PROGRESS NOTES
"Gastroenterology CLINIC VISIT,  NEW    CC/REFERRING PROVIDER: No ref. provider found  REASON FOR CONSULTATION: acute abdominal pain    HPI:   I received a phone call from one of our family practice NP this morning, asking to see the patient for consultation on acute abdominal pain.  Patient was placed for 9:45 AM appointment slot.  Patient reported mild intermittent abdominal discomfort for the past 3 months. Patient said that she has been taking 800-1600 mg of ibuprofen almost every day for chronic pain and restless legs.   Stated that on October 13th, she developed sharp and severe abdominal pain in the LLQ with associated bloating after she ate a large salad. She went on clear/full liquid diet for a few days and her symptoms resolved. She advanced her diet to regular and was feeling well for about 2 weeks. Then, she ate taco and her pain returned. Now, she is not able to eat or consume fluids due to postprandial pain. 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  Complains of nausea, but denies emesis. Complains of abdominal distention \"no matter what I eat\". No changes in stool pattern, no black stools. Denies fever, but having mild chills. Started taking OTC omeprazole 40 mg 2 days ago, but had not noticed any improvement in her symptoms.     Abdominal Pain   Location: diffuse, but more pronounced along the midline and in epigastrum  Radiation: none  Pain character: sharp  Severity: 6 on a scale of 1-10.    Duration: intermittent  Course of Illness: acute,severe pain started 2-3 weeks ago. Had abdominal discomfort for about 3 months prior.  Exacerbated by: eating, consuming fluids  Relieved by: nothing  Associated Symptoms: nausea, bloating. Sprite consumption    ROS: 10pt ROS performed and otherwise negative.    PAST MEDICAL HISTORY:  Past Medical History:   Diagnosis Date     Cervical high risk HPV (human papillomavirus) test positive 11/2/16    Neg 16/18     Cervicalgia " 10/18/2016     Neck muscle spasm 10/18/2016     NO ACTIVE PROBLEMS      Syncope, unspecified syncope type 10/18/2016       PREVIOUS ABDOMINAL/GYNECOLOGIC SURGERIES:    Past Surgical History:   Procedure Laterality Date     HC REMOVAL OF TONSILS,<13 Y/O       HC REPAIR EXTEN TENDON FINGER W/O GRAFT, EACH  03/15/2009    right ring finger     LAPAROSCOPIC HYSTERECTOMY TOTAL, SALPINGECTOMY BILATERAL N/A 3/24/2021    Procedure: HYSTERECTOMY, TOTAL, LAPAROSCOPIC, WITH BILATERAL SALPINGECTOMY;  Surgeon: Ivan Beasley MD;  Location: [facility] OR     TUBAL LIGATION       [facility]  DELIVERY+POSTPARTUM CARE           PERTINENT MEDICATIONS:  Current Outpatient Medications   Medication Sig Dispense Refill     amitriptyline (ELAVIL) 25 MG tablet Take 1 tablet (25 mg) by mouth At Bedtime Start with 12.5 mg dose at HS, increase to 25 mg in 3-4 days 30 tablet 3     magic mouthwash suspension (diphenhydrAMINE, lidocaine, aluminum-magnesium & simethicone) Take 15 mLs by mouth every 8 hours as needed for mild pain (for abdominal pain and bloating) 900 mL 0     omeprazole (PRILOSEC) 10 MG DR capsule Take 20 mg by mouth daily       omeprazole (PRILOSEC) 40 MG DR capsule Take 1 capsule (40 mg) by mouth 2 times daily Take 30 min before a meal 60 capsule 3     ondansetron (ZOFRAN) 4 MG tablet Take 1 tablet (4 mg) by mouth every 8 hours as needed for nausea (Patient not taking: Reported on 2022) 60 tablet 0       No other OTC/herbal/supplements reported by patient.    SOCIAL HISTORY:  Social History     Socioeconomic History     Marital status:      Spouse name: Not on file     Number of children: Not on file     Years of education: Not on file     Highest education level: Not on file   Occupational History     Not on file   Tobacco Use     Smoking status: Some Days     Packs/day: 0.10     Years: 4.00     Pack years: 0.40     Types: Cigarettes     Smokeless tobacco: Never   Vaping Use     Vaping Use: Never used  "  Substance and Sexual Activity     Alcohol use: No     Drug use: No     Sexual activity: Yes     Partners: Male     Birth control/protection: Female Surgical     Comment: hysterectomy   Other Topics Concern      Service Yes     Comment: [id number]  Never deployed outside of the [place]     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 Not Asked   Social History Narrative     and lives at home with her  and daughter.  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             Social Determinants of Health     Financial Resource Strain: Not on file   Food Insecurity: Not on file   Transportation Needs: Not on file   Physical Activity: Not on file   Stress: Not on file   Social Connections: Not on file   Intimate Partner Violence: Not on file   Housing Stability: Not on file       FAMILY HISTORY:  Denies colon/panc/esophageal/other GI CA, no other Millard or other HPS-related Ting. No IBD/celiac, no other AI/liver/thyroid disease.    Family History   Problem Relation Age of Onset     Breast Cancer Maternal Grandmother 45     Alcohol/Drug Maternal Grandfather         recovering alcoholic     Diabetes Paternal Grandmother      Cerebrovascular Disease Paternal Grandmother        PHYSICAL EXAMINATION:  Vitals reviewed  Ht 1.58 m (5' 2.2\")   Wt 59 kg (130 lb)   LMP 03/10/2021   BMI 23.62 kg/m      General: Patient appears uncomforable   Skin: No visualized rash or lesions on visualized skin  Eyes: EOMI, no erythema, sclera icterus or " "discharge noted  Mouth: moist mouth mucosa  Resp: breathing comfortably without accessory muscle usage, speaking in full sentences, no cough  Lung sounds clear  Card: Regular and rhythmic S1 and S2. No murmur,gallop, or rub  Abdomen: Hypoactive bowel sounds on the left. Active over the rest of the abdomen. Soft to very light palpation.Guarding or rebound tenderness present in periumbilical area and LLQ. Positive Mcmillan's sign  MSK: Appears to have normal range of motion based on visualized movements  Neurologic: No apparent tremors, facial movements symmetric  Psych: affect normal, alert and oriented      PERTINENT STUDIES Reviewed in EMR    ASSESSMENT/PLAN:    42 year old female  presented  to GI clinic for evaluation of acute intermittent abdominal pain since approximately 2 weeks ago. Pain is aggravated by eating salads and spicy foods. Onset location -LLQ, but now having pain in periumbilical area and epigastrium. Rates pain at 6/10. No signs of peritoneal involvement or acute abdomen on exam but guarding and rebound tenderness present. Vitals WNL, no fever or hypotension.  Surgical Hx: hysterectomy, . Patient has been taking 800-1600 mg a day of ibuprofen \"almost every day\" for several months for her chronic pain and restless legs.    Discussed several potential diagnosis with the patient including but not limited to NSAIDs induced peptic erosions or ulcers,acute colitis, acute gastritis/duodenitis, IBS, appendicitis, diverticulitis,intususseption or partial bowel obstruction, bowel ischemia, cholelithiasis, cholecystitis, or pancreatitis. Discussed the patient case with Dr. Healy.  Positive Mcmillan's sign on exam. STAT abdominal US and x-ray ordered. Received phone call with preliminary report from radiology- unremarkable studies, no findings to explain the patient's acute pain.  Lab work ordered and reviewed. Normal CBC, CMP, lipase and amylase, and CRP. No signs of acute bleeding or " infectious/inflammatory process.  Will order upper and lower endoscopy.  Mylanta given at the office to relieve abdominal pain. Will order Magic mouthwash to take orally as needed.  Increase omeprazole to 40 mg twice a day, take 30 minutes before a meal.  Start 12.5 mg of amitriptyline before bed X 5 days, then increase the dose to 25 mg before bed.   Advance diet as tolerate, but avoid dairy, alcohol, caffeine, spicy and greasy foods.      ICD-10-CM    1. Acute generalized abdominal pain  R10.84 US Abdomen Complete     XR Abdomen 2 Views     CBC with platelets     Comprehensive metabolic panel (BMP + Alb, Alk Phos, ALT, AST, Total. Bili, TP)     CRP, inflammation     Lipase     Amylase     GGT     omeprazole (PRILOSEC) 40 MG DR capsule     alum & mag hydroxide-simethicone (MAALOX  ES) suspension 30 mL     amitriptyline (ELAVIL) 25 MG tablet     magic mouthwash suspension (diphenhydrAMINE, lidocaine, aluminum-magnesium & simethicone)     Adult GI  Referral - Procedure Only      2. Nausea  R11.0 omeprazole (PRILOSEC) 40 MG DR capsule     amitriptyline (ELAVIL) 25 MG tablet     magic mouthwash suspension (diphenhydrAMINE, lidocaine, aluminum-magnesium & simethicone)     Adult GI  Referral - Procedure Only      3. Abdominal bloating  R14.0 Adult GI  Referral - Procedure Only            RTC in 4 weeks. OK to schedule a virtual visit if it is more convenient for the patient.    Thank you for this consultation. It was a pleasure to participate in the care of this patient; please contact us with any further questions.    GINA Cantrell, FNP-C  [facility]  Gastroenterology Department  [place], [place]    This note was created with Dragon voice recognition software, and while reviewed for accuracy, inadvertent minor typographic errors may occur. Please contact the provider if you have any questions.

## 2022-11-04 NOTE — LETTER
"    11/4/2022         RE: Nicole Phoenix  [id number] [address] [place] [id number]        Dear Colleague,    Thank you for referring your patient, Nicole Phoenix, to the [facility]. Please see a copy of my visit note below.    Gastroenterology CLINIC VISIT,  NEW    CC/REFERRING PROVIDER: No ref. provider found  REASON FOR CONSULTATION: acute abdominal pain    HPI:   I received a phone call from one of our family practice NP this morning, asking to see the patient for consultation on acute abdominal pain.  Patient was placed for 9:45 AM appointment slot.  Patient reported mild intermittent abdominal discomfort for the past 3 months. Patient said that she has been taking 800-1600 mg of ibuprofen almost every day for chronic pain and restless legs.   Stated that on October 13th, she developed sharp and severe abdominal pain in the LLQ with associated bloating after she ate a large salad. She went on clear/full liquid diet for a few days and her symptoms resolved. She advanced her diet to regular and was feeling well for about 2 weeks. Then, she ate taco and her pain returned. Now, she is not able to eat or consume fluids due to postprandial pain. 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  Complains of nausea, but denies emesis. Complains of abdominal distention \"no matter what I eat\". No changes in stool pattern, no black stools. Denies fever, but having mild chills. Started taking OTC omeprazole 40 mg 2 days ago, but had not noticed any improvement in her symptoms.     Abdominal Pain   Location: diffuse, but more pronounced along the midline and in epigastrum  Radiation: none  Pain character: sharp  Severity: 6 on a scale of 1-10.    Duration: intermittent  Course of Illness: acute,severe pain started 2-3 weeks ago. Had abdominal discomfort for about 3 months prior.  Exacerbated by: eating, consuming fluids  Relieved by: nothing  Associated Symptoms: nausea, bloating. " Sprite consumption    ROS: 10pt ROS performed and otherwise negative.    PAST MEDICAL HISTORY:  Past Medical History:   Diagnosis Date     Cervical high risk HPV (human papillomavirus) test positive 16    Neg      Cervicalgia 10/18/2016     Neck muscle spasm 10/18/2016     NO ACTIVE PROBLEMS      Syncope, unspecified syncope type 10/18/2016       PREVIOUS ABDOMINAL/GYNECOLOGIC SURGERIES:    Past Surgical History:   Procedure Laterality Date     HC REMOVAL OF TONSILS,<13 Y/O       HC REPAIR EXTEN TENDON FINGER W/O GRAFT, EACH  03/15/2009    right ring finger     LAPAROSCOPIC HYSTERECTOMY TOTAL, SALPINGECTOMY BILATERAL N/A 3/24/2021    Procedure: HYSTERECTOMY, TOTAL, LAPAROSCOPIC, WITH BILATERAL SALPINGECTOMY;  Surgeon: Ivan Beasley MD;  Location: [facility]     TUBAL LIGATION       [facility]  DELIVERY+POSTPARTUM CARE           PERTINENT MEDICATIONS:  Current Outpatient Medications   Medication Sig Dispense Refill     amitriptyline (ELAVIL) 25 MG tablet Take 1 tablet (25 mg) by mouth At Bedtime Start with 12.5 mg dose at HS, increase to 25 mg in 3-4 days 30 tablet 3     magic mouthwash suspension (diphenhydrAMINE, lidocaine, aluminum-magnesium & simethicone) Take 15 mLs by mouth every 8 hours as needed for mild pain (for abdominal pain and bloating) 900 mL 0     omeprazole (PRILOSEC) 10 MG DR capsule Take 20 mg by mouth daily       omeprazole (PRILOSEC) 40 MG DR capsule Take 1 capsule (40 mg) by mouth 2 times daily Take 30 min before a meal 60 capsule 3     ondansetron (ZOFRAN) 4 MG tablet Take 1 tablet (4 mg) by mouth every 8 hours as needed for nausea (Patient not taking: Reported on 2022) 60 tablet 0       No other OTC/herbal/supplements reported by patient.    SOCIAL HISTORY:  Social History     Socioeconomic History     Marital status:      Spouse name: Not on file     Number of children: Not on file     Years of education: Not on file     Highest education level: Not on file    Occupational History     Not on file   Tobacco Use     Smoking status: Some Days     Packs/day: 0.10     Years: 4.00     Pack years: 0.40     Types: Cigarettes     Smokeless tobacco: Never   Vaping Use     Vaping Use: Never used   Substance and Sexual Activity     Alcohol use: No     Drug use: No     Sexual activity: Yes     Partners: Male     Birth control/protection: Female Surgical     Comment: hysterectomy   Other Topics Concern      Service Yes     Comment: [id number]  Never deployed outside of the [place]     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 Not Asked   Social History Narrative     and lives at home with her  and daughter.  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             Social Determinants of Health     Financial Resource Strain: Not on file   Food Insecurity: Not on file   Transportation Needs: Not on file   Physical Activity: Not on file   Stress: Not on file   Social Connections: Not on file   Intimate Partner Violence: Not on file   Housing Stability: Not on file       FAMILY HISTORY:  Denies colon/panc/esophageal/other GI CA, no other Millard or other HPS-related Ting. No IBD/celiac, no other AI/liver/thyroid disease.    Family History   Problem Relation Age of Onset     Breast Cancer Maternal Grandmother 45     Alcohol/Drug Maternal Grandfather         recovering alcoholic     Diabetes Paternal Grandmother      Cerebrovascular Disease Paternal Grandmother        PHYSICAL EXAMINATION:  Vitals  "reviewed  Ht 1.58 m (5' 2.2\")   Wt 59 kg (130 lb)   LMP 03/10/2021   BMI 23.62 kg/m      General: Patient appears uncomforable   Skin: No visualized rash or lesions on visualized skin  Eyes: EOMI, no erythema, sclera icterus or discharge noted  Mouth: moist mouth mucosa  Resp: breathing comfortably without accessory muscle usage, speaking in full sentences, no cough  Lung sounds clear  Card: Regular and rhythmic S1 and S2. No murmur,gallop, or rub  Abdomen: Hypoactive bowel sounds on the left. Active over the rest of the abdomen. Soft to very light palpation.Guarding or rebound tenderness present in periumbilical area and LLQ. Positive Mcmillan's sign  MSK: Appears to have normal range of motion based on visualized movements  Neurologic: No apparent tremors, facial movements symmetric  Psych: affect normal, alert and oriented      PERTINENT STUDIES Reviewed in EMR    ASSESSMENT/PLAN:    42 year old female  presented  to GI clinic for evaluation of acute intermittent abdominal pain since approximately 2 weeks ago. Pain is aggravated by eating salads and spicy foods. Onset location -LLQ, but now having pain in periumbilical area and epigastrium. Rates pain at 6/10. No signs of peritoneal involvement or acute abdomen on exam but guarding and rebound tenderness present. Vitals WNL, no fever or hypotension.  Surgical Hx: hysterectomy, . Patient has been taking 800-1600 mg a day of ibuprofen \"almost every day\" for several months for her chronic pain and restless legs.    Discussed several potential diagnosis with the patient including but not limited to NSAIDs induced peptic erosions or ulcers,acute colitis, acute gastritis/duodenitis, IBS, appendicitis, diverticulitis,intususseption or partial bowel obstruction, bowel ischemia, cholelithiasis, cholecystitis, or pancreatitis. Discussed the patient case with Dr. Healy.  Positive Mcmillan's sign on exam. STAT abdominal US and x-ray ordered. Received phone call with " preliminary report from radiology- unremarkable studies, no findings to explain the patient's acute pain.  Lab work ordered and reviewed. Normal CBC, CMP, lipase and amylase, and CRP. No signs of acute bleeding or infectious/inflammatory process.  Will order upper and lower endoscopy.  Mylanta given at the office to relieve abdominal pain. Will order Magic mouthwash to take orally as needed.  Increase omeprazole to 40 mg twice a day, take 30 minutes before a meal.  Start 12.5 mg of amitriptyline before bed X 5 days, then increase the dose to 25 mg before bed.   Advance diet as tolerate, but avoid dairy, alcohol, caffeine, spicy and greasy foods.      ICD-10-CM    1. Acute generalized abdominal pain  R10.84 US Abdomen Complete     XR Abdomen 2 Views     CBC with platelets     Comprehensive metabolic panel (BMP + Alb, Alk Phos, ALT, AST, Total. Bili, TP)     CRP, inflammation     Lipase     Amylase     GGT     omeprazole (PRILOSEC) 40 MG DR capsule     alum & mag hydroxide-simethicone (MAALOX  ES) suspension 30 mL     amitriptyline (ELAVIL) 25 MG tablet     magic mouthwash suspension (diphenhydrAMINE, lidocaine, aluminum-magnesium & simethicone)     Adult GI  Referral - Procedure Only      2. Nausea  R11.0 omeprazole (PRILOSEC) 40 MG DR capsule     amitriptyline (ELAVIL) 25 MG tablet     magic mouthwash suspension (diphenhydrAMINE, lidocaine, aluminum-magnesium & simethicone)     Adult GI  Referral - Procedure Only      3. Abdominal bloating  R14.0 Adult GI  Referral - Procedure Only            RTC in 4 weeks. OK to schedule a virtual visit if it is more convenient for the patient.    Thank you for this consultation. It was a pleasure to participate in the care of this patient; please contact us with any further questions.    GINA Cantrell, FNP-C  [facility]  Gastroenterology Department  [place], [place]    This note was created with Dragon voice recognition software, and while  reviewed for accuracy, inadvertent minor typographic errors may occur. Please contact the provider if you have any questions.      Again, thank you for allowing me to participate in the care of your patient.        Sincerely,        GINA LEDBETTER CNP

## 2022-11-04 NOTE — PROGRESS NOTES
Assessment & Plan     Acute generalized abdominal pain  Differentials include gall bladder, GERD, gastric ulcer, IBS.  Will check basic labs and refer to GI.  Could increase PPI and add pepcid.  Keep a dairy to identify triggers.    - CBC with platelets  - Comprehensive metabolic panel (BMP + Alb, Alk Phos, ALT, AST, Total. Bili, TP)  - CRP, inflammation  - Lipase  - Amylase  - GGT    Major depression- followed by PCP      35 minutes spent on the date of the encounter doing chart review, review of test results, interpretation of tests, patient visit and documentation        Nicotine/Tobacco Cessation:  She reports that she has been smoking cigarettes. She has a 0.40 pack-year smoking history. She has never used smokeless tobacco.  Nicotine/Tobacco Cessation Plan:   Information offered: Patient not interested at this time      No follow-ups on file.    Aylin Rubi NP  [facility] RANCHO Rivera is a 42 year old, presenting for the following health issues:  Recheck Medication, Abdominal Pain, and Establish Care    Patient would like to establish care with you.     History of Present Illness       Reason for visit:  Epigastric pain and insomnia  Symptom onset:  3-4 weeks ago  Symptoms include:  Epigastric pain  Symptom intensity:  Severe  Symptom progression:  Staying the same  Had these symptoms before:  No  What makes it worse:  Eating  What makes it better:  No    She eats 2-3 servings of fruits and vegetables daily.She consumes 1 sweetened beverage(s) daily.She exercises with enough effort to increase her heart rate 30 to 60 minutes per day.  She exercises with enough effort to increase her heart rate 5 days per week.   She is taking medications regularly.    Today's PHQ-9         PHQ-9 Total Score: 10    PHQ-9 Q9 Thoughts of better off dead/self-harm past 2 weeks :   Not at all    How difficult have these problems made it for you to do your work, take care of things at home,  "or get along with other people: Very difficult     3 weeks ago- thought diverticulitis- went on clear liquid then full.  Ate salad and had bloating and left sided pain.  23rd ate taco- then had epigastric pain.  Has had issues since then.  Did clear liquid again.  Stayed away from raw vegetables.  31st had epigastric pain- doubled over in pain.  Told to start prilosec.  Wondering if ulcer.  Feels can not eat- pain with anything but water.  Doing liquid IV.  Usually eats salads every day for work.  Then noticed bloating- so took out onions, peppers, cucumbers.  Has not had raw vegetable for 3 weeks.  Did not go in- wanted to try to treat at home.    No family history of colon cancer.  Alcohol once a month. Quit smoking a couple months ago- smoked for \"years\".  Ibuprofen- takes 1600 mg almost every day- especially lately- restless legs.  Has had chronic pain for \" years\".  She is a RN.  Stress.  Went off of antidepressant- feels really good with mood.     Day 3 of omeprazole.  Tried to eat bread and had excruziating pain agin      No issues really with eating prior to this.      Works at hospital in [place]      Review of Systems   Constitutional, HEENT, cardiovascular, pulmonary, GI, , musculoskeletal, neuro, skin, endocrine and psych systems are negative, except as otherwise noted.      Objective    /70   Pulse 88   Temp 97.2  F (36.2  C) (Temporal)   Resp 18   Ht 1.58 m (5' 2.2\")   Wt 59.1 kg (130 lb 6 oz)   LMP 03/10/2021   SpO2 99%   BMI 23.69 kg/m    Body mass index is 23.69 kg/m .  Physical Exam   GENERAL: healthy, alert and no distress  EYES: Eyes grossly normal to inspection, PERRL and conjunctivae and sclerae normal  HENT: ear canals and TM's normal, nose and mouth without ulcers or lesions  NECK: no adenopathy, no asymmetry, masses, or scars and thyroid normal to palpation  RESP: lungs clear to auscultation - no rales, rhonchi or wheezes  CV: regular rate and rhythm, normal S1 S2, no S3 or " S4, no murmur, click or rub, no peripheral edema and peripheral pulses strong  ABDOMEN: tenderness umbilical and no guarding or rebound.  and bowel sounds normal  MS: no gross musculoskeletal defects noted, no edema  SKIN: no suspicious lesions or rashes  NEURO: Normal strength and tone, mentation intact and speech normal  PSYCH: mentation appears normal, affect normal/bright

## 2022-11-10 DIAGNOSIS — N95.1 SYMPTOMATIC MENOPAUSAL OR FEMALE CLIMACTERIC STATES: ICD-10-CM

## 2022-11-11 RX ORDER — ESTRADIOL 1 MG/1
1 TABLET ORAL DAILY
Qty: 90 TABLET | Refills: 3 | OUTPATIENT
Start: 2022-11-11

## 2022-11-17 DIAGNOSIS — N95.1 SYMPTOMATIC MENOPAUSAL OR FEMALE CLIMACTERIC STATES: ICD-10-CM

## 2022-11-21 ENCOUNTER — HEALTH MAINTENANCE LETTER (OUTPATIENT)
Age: 42
End: 2022-11-21

## 2022-11-22 RX ORDER — ESTRADIOL 1 MG/1
1 TABLET ORAL DAILY
Qty: 90 TABLET | Refills: 3 | OUTPATIENT
Start: 2022-11-22

## 2022-12-14 NOTE — TELEPHONE ENCOUNTER
Refills have been requested for the following medications:         ondansetron (ZOFRAN) 4 MG tablet [John Arias MD]         traMADol (ULTRAM) 50 MG tablet [John Arias MD]     Preferred pharmacy: [id number] - [address]   Solaraze Counseling:  I discussed with the patient the risks of Solaraze including but not limited to erythema, scaling, itching, weeping, crusting, and pain.

## 2023-04-16 ENCOUNTER — HEALTH MAINTENANCE LETTER (OUTPATIENT)
Age: 43
End: 2023-04-16

## 2023-08-07 DIAGNOSIS — N95.1 SYMPTOMATIC MENOPAUSAL OR FEMALE CLIMACTERIC STATES: ICD-10-CM

## 2023-08-07 RX ORDER — ESTRADIOL 1 MG/1
1 TABLET ORAL DAILY
Qty: 90 TABLET | Refills: 3 | OUTPATIENT
Start: 2023-08-07

## 2023-11-15 DIAGNOSIS — N95.1 SYMPTOMATIC MENOPAUSAL OR FEMALE CLIMACTERIC STATES: ICD-10-CM

## 2023-11-15 RX ORDER — ESTRADIOL 1 MG/1
1 TABLET ORAL DAILY
Qty: 90 TABLET | Refills: 3 | OUTPATIENT
Start: 2023-11-15

## 2023-11-15 NOTE — TELEPHONE ENCOUNTER
I have not seen this patient since 2021. I will remove myself as PCP. Please forward request to whomever the patient has been seeing.     Win Reyna PA-C on 11/15/2023 at 5:20 PM

## 2024-02-04 ENCOUNTER — HEALTH MAINTENANCE LETTER (OUTPATIENT)
Age: 44
End: 2024-02-04

## 2024-06-23 ENCOUNTER — HEALTH MAINTENANCE LETTER (OUTPATIENT)
Age: 44
End: 2024-06-23

## 2025-07-12 ENCOUNTER — HEALTH MAINTENANCE LETTER (OUTPATIENT)
Age: 45
End: 2025-07-12

## (undated) DEVICE — SU VICRYL 0 UR-6 27" J603H

## (undated) DEVICE — GLOVE ESTEEM POWDER FREE 8.0  2D72PL80

## (undated) DEVICE — PREP CHLORAPREP 26ML TINTED ORANGE  260815

## (undated) DEVICE — SYR 20ML LL W/O NDL 302830

## (undated) DEVICE — CATH TRAY FOLEY 16FR DRAINAGE BAG STATLOCK 899916

## (undated) DEVICE — DRAPE POUCH INSTRUMENT 3 POCKET 1018L

## (undated) DEVICE — NDL SPINAL 22GA 3.5" QUINCKE 405181

## (undated) DEVICE — ESU LIGASURE LAPAROSCOPIC BLUNT TIP SEALER 5MMX37CM LF1837

## (undated) DEVICE — ENDO TROCAR FIRST ENTRY KII FIOS ADV FIX 05X100MM CFF03

## (undated) DEVICE — SUCTION STRYKERFLOW II 250-070-500

## (undated) DEVICE — PACK AB HYST/LITHOTOMY CUSTOM NORTHLAND

## (undated) DEVICE — SU VICRYL 4-0 PS-2 27" UND J426H

## (undated) DEVICE — TUBING INSUFFLATION W/FILTER 10FT GS1016

## (undated) DEVICE — BLADE KNIFE SURG 11 371111

## (undated) DEVICE — DEVICE SUTURE GRASPER TROCAR CLOSURE 14GA PMITCSG

## (undated) DEVICE — LUBRICATING JELLY 4.25OZ

## (undated) DEVICE — GLOVE ESTEEM BLUE W/NEU-THERA 8.0  2D73PB80

## (undated) DEVICE — ADH SKIN CLOSURE PREMIERPRO EXOFIN 1.0ML 3470

## (undated) DEVICE — ESU ENDO SCISSORS 5MM CVD 5DCS

## (undated) DEVICE — SU WND CLOSURE RELOAD VLOC 180 ABS 0 GREEN 8" VLOCA008L

## (undated) DEVICE — ENDO TROCAR SLEEVE KII ADV FIXATION 05X100MM CFS02

## (undated) DEVICE — EVAC SYSTEM CLEAR FLOW SC082500

## (undated) DEVICE — DEVICE ENDO STITCH APPLIER 10MM 173016

## (undated) DEVICE — KIT PATIENT POSITIONING PIGAZZI LATEX FREE 40580

## (undated) DEVICE — ESU CORD MONOPOLAR HIGH FREQUENCY 26006M-D/10

## (undated) DEVICE — TUBING SUCTION 12"X1/4" N612

## (undated) RX ORDER — PROPOFOL 10 MG/ML
INJECTION, EMULSION INTRAVENOUS
Status: DISPENSED
Start: 2021-03-24

## (undated) RX ORDER — KETOROLAC TROMETHAMINE 30 MG/ML
INJECTION, SOLUTION INTRAMUSCULAR; INTRAVENOUS
Status: DISPENSED
Start: 2021-03-24

## (undated) RX ORDER — HYDROMORPHONE HYDROCHLORIDE 1 MG/ML
INJECTION, SOLUTION INTRAMUSCULAR; INTRAVENOUS; SUBCUTANEOUS
Status: DISPENSED
Start: 2021-03-24

## (undated) RX ORDER — ONDANSETRON 2 MG/ML
INJECTION INTRAMUSCULAR; INTRAVENOUS
Status: DISPENSED
Start: 2021-03-24

## (undated) RX ORDER — DEXAMETHASONE SODIUM PHOSPHATE 10 MG/ML
INJECTION, SOLUTION INTRAMUSCULAR; INTRAVENOUS
Status: DISPENSED
Start: 2021-03-24

## (undated) RX ORDER — LIDOCAINE HYDROCHLORIDE 20 MG/ML
INJECTION, SOLUTION EPIDURAL; INFILTRATION; INTRACAUDAL; PERINEURAL
Status: DISPENSED
Start: 2021-03-24

## (undated) RX ORDER — BUPIVACAINE HYDROCHLORIDE AND EPINEPHRINE 5; 5 MG/ML; UG/ML
INJECTION, SOLUTION EPIDURAL; INTRACAUDAL; PERINEURAL
Status: DISPENSED
Start: 2021-03-24

## (undated) RX ORDER — DEXMEDETOMIDINE HYDROCHLORIDE 100 UG/ML
INJECTION, SOLUTION INTRAVENOUS
Status: DISPENSED
Start: 2021-03-24

## (undated) RX ORDER — FENTANYL CITRATE 50 UG/ML
INJECTION, SOLUTION INTRAMUSCULAR; INTRAVENOUS
Status: DISPENSED
Start: 2021-03-24